# Patient Record
Sex: MALE | Race: WHITE | NOT HISPANIC OR LATINO | Employment: FULL TIME | URBAN - METROPOLITAN AREA
[De-identification: names, ages, dates, MRNs, and addresses within clinical notes are randomized per-mention and may not be internally consistent; named-entity substitution may affect disease eponyms.]

---

## 2017-06-15 ENCOUNTER — OFFICE VISIT (OUTPATIENT)
Dept: PEDIATRICS | Facility: CLINIC | Age: 52
End: 2017-06-15
Payer: COMMERCIAL

## 2017-06-15 VITALS
SYSTOLIC BLOOD PRESSURE: 130 MMHG | HEIGHT: 68 IN | OXYGEN SATURATION: 97 % | BODY MASS INDEX: 32.58 KG/M2 | TEMPERATURE: 97.4 F | WEIGHT: 215 LBS | HEART RATE: 69 BPM | DIASTOLIC BLOOD PRESSURE: 70 MMHG

## 2017-06-15 DIAGNOSIS — Z13.6 CARDIOVASCULAR SCREENING; LDL GOAL LESS THAN 160: ICD-10-CM

## 2017-06-15 DIAGNOSIS — Z13.1 SCREENING FOR DIABETES MELLITUS: ICD-10-CM

## 2017-06-15 DIAGNOSIS — Z12.5 SCREENING FOR PROSTATE CANCER: ICD-10-CM

## 2017-06-15 DIAGNOSIS — D22.9 ATYPICAL MOLE: ICD-10-CM

## 2017-06-15 DIAGNOSIS — Z13.29 SCREENING FOR THYROID DISORDER: ICD-10-CM

## 2017-06-15 DIAGNOSIS — Z12.11 SCREENING FOR COLON CANCER: ICD-10-CM

## 2017-06-15 DIAGNOSIS — Z11.59 NEED FOR HEPATITIS C SCREENING TEST: ICD-10-CM

## 2017-06-15 DIAGNOSIS — Z00.00 ENCOUNTER FOR ROUTINE ADULT HEALTH EXAMINATION WITHOUT ABNORMAL FINDINGS: Primary | ICD-10-CM

## 2017-06-15 PROCEDURE — 99396 PREV VISIT EST AGE 40-64: CPT | Performed by: NURSE PRACTITIONER

## 2017-06-15 NOTE — PROGRESS NOTES
SUBJECTIVE:     CC: Nando Wolfe is an 51 year old male who presents for preventative health visit.     Physical   Annual:     Getting at least 3 servings of Calcium per day::  Yes    Bi-annual eye exam::  Yes    Dental care twice a year::  Yes    Sleep apnea or symptoms of sleep apnea::  Excessive snoring    Diet::  Regular (no restrictions)    Frequency of exercise::  4-5 days/week    Duration of exercise::  15-30 minutes    Taking medications regularly::  Yes    Medication side effects::  None    Additional concerns today::  No      Today's PHQ-2 Score: Answers for HPI/ROS submitted by the patient on 6/12/2017   PHQ-2 Score: 0    PHQ-2 ( 1999 [employer]) 6/15/2017   Q1: Little interest or pleasure in doing things 0   Q2: Feeling down, depressed or hopeless 0   PHQ-2 Score 0   Q1: Little interest or pleasure in doing things -   Q2: Feeling down, depressed or hopeless -   PHQ-2 Score -       Abuse: Current or Past(Physical, Sexual or Emotional)- No  Do you feel safe in your environment - Yes    Social History   Substance Use Topics     Smoking status: Former Smoker     Packs/day: 0.50     Types: Cigarettes     Quit date: 5/1/2013     Smokeless tobacco: Never Used     Alcohol use Yes      Comment: 4-5 drinks per wk     The patient does not drink >3 drinks per day nor >7 drinks per week.    Last PSA:   PSA   Date Value Ref Range Status   02/23/2011 0.52 0 - 4 ug/L Final       Recent Labs   Lab Test  02/23/11   0754   CHOL  197   HDL  50   LDL  129   TRIG  93   CHOLHDLRATIO  4.0       Reviewed orders with patient. Reviewed health maintenance and updated orders accordingly - Yes    Reviewed and updated as needed this visit by clinical staff  Tobacco  Allergies  Meds  Med Hx  Surg Hx  Fam Hx  Soc Hx        Reviewed and updated as needed this visit by Provider        Past Medical History:   Diagnosis Date     DDD (degenerative disc disease), lumbar       Past Surgical History:   Procedure Laterality Date     HERNIA  REPAIR       TONSILLECTOMY       ROS:  CONSTITUTIONAL:NEGATIVE for fever, chills, change in weight  INTEGUMENTARY/SKIN: NEGATIVE for changing lesion  and POSITIVE for skin tag on right abdomen   EYES: NEGATIVE for vision changes or irritation  ENT: NEGATIVE for ear, mouth and throat problems  RESP:NEGATIVE for significant cough or SOB  CV: NEGATIVE for chest pain, palpitations or peripheral edema  GI: NEGATIVE for nausea, abdominal pain, heartburn, or change in bowel habits   male: negative for dysuria, hematuria, decreased urinary stream, erectile dysfunction, urethral discharge  MUSCULOSKELETAL:NEGATIVE for significant arthralgias or myalgia  NEURO: NEGATIVE for weakness, dizziness or paresthesias  ENDOCRINE: NEGATIVE for temperature intolerance, skin/hair changes  HEME/ALLERGY/IMMUNE: NEGATIVE for bleeding problems  PSYCHIATRIC: NEGATIVE for changes in mood or affect    Problem list, Medication list, Allergies, and Medical/Social/Surgical histories reviewed in [facility] and updated as appropriate.  Labs reviewed in EPIC  BP Readings from Last 3 Encounters:   06/15/17 130/70   12/12/12 120/69   02/14/11 130/86    Wt Readings from Last 3 Encounters:   06/15/17 215 lb (97.5 kg)   12/12/12 228 lb 4 oz (103.5 kg)   02/14/11 234 lb 9.6 oz (106.4 kg)                  Patient Active Problem List   Diagnosis     CARDIOVASCULAR SCREENING; LDL GOAL LESS THAN 160     Obese     Back pain with radiation     Elevated blood sugar     DDD (degenerative disc disease), lumbar     Discogenic syndrome, lumbar     Past Surgical History:   Procedure Laterality Date     HERNIA REPAIR       TONSILLECTOMY         Social History   Substance Use Topics     Smoking status: Former Smoker     Packs/day: 0.50     Types: Cigarettes     Quit date: 5/1/2013     Smokeless tobacco: Never Used     Alcohol use Yes      Comment: 4-5 drinks per wk     Family History   Problem Relation Age of Onset     HEART DISEASE Paternal Grandfather          No current  "outpatient prescriptions on file.     No Known Allergies  OBJECTIVE:     /70 (BP Location: Right arm, Patient Position: Sitting, Cuff Size: Adult Regular)  Pulse 69  Temp 97.4  F (36.3  C) (Temporal)  Ht 5' 8\" (1.727 m)  Wt 215 lb (97.5 kg)  SpO2 97%  BMI 32.69 kg/m2   Wt Readings from Last 4 Encounters:   06/15/17 215 lb (97.5 kg)   12/12/12 228 lb 4 oz (103.5 kg)   02/14/11 234 lb 9.6 oz (106.4 kg)       EXAM:  GENERAL: healthy, alert and no distress  EYES: Eyes grossly normal to inspection, PERRL and conjunctivae and sclerae normal  HENT: ear canals and TM's normal, nose and mouth without ulcers or lesions  NECK: no adenopathy, no asymmetry, masses, or scars and thyroid normal to palpation  RESP: lungs clear to auscultation - no rales, rhonchi or wheezes  CV: regular rate and rhythm, normal S1 S2, no S3 or S4, no murmur, click or rub, no peripheral edema and peripheral pulses strong  ABDOMEN: soft, nontender, no hepatosplenomegaly, no masses and bowel sounds normal   (male): normal male genitalia without lesions or urethral discharge, no hernia  RECTAL: normal sphincter tone, no rectal masses and prostate of normal size for age, smooth, nontender without masses/nodules  MS: no gross musculoskeletal defects noted, no edema  SKIN: no suspicious lesions or rashes. Atypical pearly mole on right side of nose   NEURO: Normal strength and tone, mentation intact and speech normal  PSYCH: mentation appears normal, affect normal/bright  LYMPH: no cervical, supraclavicular, axillary, or inguinal adenopathy    ASSESSMENT/PLAN:     Nando was seen today for physical.    Diagnoses and all orders for this visit:    Encounter for routine adult health examination without abnormal findings  -     **Comprehensive metabolic panel FUTURE anytime; Future  -     **Lipid panel reflex to direct LDL FUTURE anytime; Future  -     **TSH with free T4 reflex FUTURE anytime; Future  -     **Hepatitis C Screen Reflex to RNA " FUTURE anytime; Future  -     JUST IN CASE; Future    Need for hepatitis C screening test  -     **Hepatitis C Screen Reflex to RNA FUTURE anytime; Future    Screening for diabetes mellitus  -     **Comprehensive metabolic panel FUTURE anytime; Future    Screening for colon cancer  -     GASTROENTEROLOGY ADULT REF PROCEDURE ONLY    CARDIOVASCULAR SCREENING; LDL GOAL LESS THAN 160  -     **Lipid panel reflex to direct LDL FUTURE anytime; Future    Screening for thyroid disorder  -     **TSH with free T4 reflex FUTURE anytime; Future    Atypical mole nose  -     DERMATOLOGY REFERRAL    Screening for prostate cancer  -     Prostate spec antigen screen; Future    PLAN:    Patient needs to follow up in if no improvement,or sooner if worsening of symptoms or other symptoms develop.  CONSULTATION/REFERRAL to gastroenterology for colonoscopy   Dermatology referral   FUTURE LABS:       - Schedule a fasting blood draw   Will follow up and/or notify patient of  results via My Chart to determine further need for followup  Follow up office visit in one year for annual health maintenance exam, sooner PRN.      COUNSELING:   Reviewed preventive health counseling, as reflected in patient instructions  Special attention given to:        Regular exercise       Healthy diet/nutrition       Consider Hep C screening for patients born between 1945 and 1965       Colon cancer screening       Prostate cancer screening       The ASCVD Risk score ([employer] ANUPAMA Jr, et al., 2013) failed to calculate for the following reasons:    Cannot find a previous HDL lab    Cannot find a previous total cholesterol lab    BP Screening:   Last 3 BP Readings:    BP Readings from Last 3 Encounters:   06/15/17 130/70   12/12/12 120/69   02/14/11 130/86       The following was recommended to the patient:  Re-screen BP within a year and recommended lifestyle modifications     reports that he quit smoking about 4 years ago. His smoking use included Cigarettes. He  "smoked 0.50 packs per day. He has never used smokeless tobacco.    Estimated body mass index is 34.45 kg/(m^2) as calculated from the following:    Height as of 12/12/12: 5' 8.25\" (1.734 m).    Weight as of 12/12/12: 228 lb 4 oz (103.5 kg).   Weight management plan: Discussed healthy diet and exercise guidelines and patient will follow up in 12 months in clinic to re-evaluate.    Counseling Resources:  ATP IV Guidelines  Pooled Cohorts Equation Calculator  FRAX Risk Assessment  ICSI Preventive Guidelines  Dietary Guidelines for Americans, 2010  USDA's MyPlate  ASA Prophylaxis  Lung CA Screening    KWADWO Soares CNP  [facility]  "

## 2017-06-15 NOTE — MR AVS SNAPSHOT
After Visit Summary   6/15/2017    Nando Wolfe    MRN: [id number]           Patient Information     Date Of Birth          1965        Visit Information        Provider Department      6/15/2017 4:20 PM Shania Sosa APRN CNP [facility]        Today's Diagnoses     Encounter for routine adult health examination without abnormal findings    -  1    Need for hepatitis C screening test        Screening for diabetes mellitus        Screening for colon cancer        CARDIOVASCULAR SCREENING; LDL GOAL LESS THAN 160        Screening for thyroid disorder        Atypical mole nose        Screening for prostate cancer          Care Instructions    PLAN:   1.  Orders Placed This Encounter   Procedures     **Comprehensive metabolic panel FUTURE anytime     **Lipid panel reflex to direct LDL FUTURE anytime     **TSH with free T4 reflex FUTURE anytime     **Hepatitis C Screen Reflex to RNA FUTURE anytime     JUST IN CASE     Prostate spec antigen screen     GASTROENTEROLOGY ADULT REF PROCEDURE ONLY     DERMATOLOGY REFERRAL     2. Patient needs to follow up in if no improvement,or sooner if worsening of symptoms or other symptoms develop.  CONSULTATION/REFERRAL to gastroenterology for colonoscopy   Dermatology referral   FUTURE LABS:       - Schedule a fasting blood draw   Will follow up and/or notify patient of  results via My Chart to determine further need for followup  Follow up office visit in one year for annual health maintenance exam, sooner PRN.    Preventive Health Recommendations  Male Ages 50 - 64    Yearly exam:             See your health care provider every year in order to  o   Review health changes.   o   Discuss preventive care.    o   Review your medicines if your doctor has prescribed any.     Have a cholesterol test every 5 years, or more frequently if you are at risk for high cholesterol/heart disease.     Have a diabetes test (fasting glucose) every three  years. If you are at risk for diabetes, you should have this test more often.     Have a colonoscopy at age 50, or have a yearly FIT test (stool test). These exams will check for colon cancer.      Talk with your health care provider about whether or not a prostate cancer screening test (PSA) is right for you.    You should be tested each year for STDs (sexually transmitted diseases), if you re at risk.     Shots: Get a flu shot each year. Get a tetanus shot every 10 years.     Nutrition:    Eat at least 5 servings of fruits and vegetables daily.     Eat whole-grain bread, whole-wheat pasta and brown rice instead of white grains and rice.     Talk to your provider about Calcium and Vitamin D.     Lifestyle    Exercise for at least 150 minutes a week (30 minutes a day, 5 days a week). This will help you control your weight and prevent disease.     Limit alcohol to one drink per day.     No smoking.     Wear sunscreen to prevent skin cancer.     See your dentist every six months for an exam and cleaning.     See your eye doctor every 1 to 2 years.    It was a pleasure seeing you today at the [facility]. Thank you for allowing us to care for you today. We truly hope we provided you with the excellent service you deserve. Please let us know if there is anything else we can do for you so we can be sure you are leaving completley satisfied with your care experience.       General information about your clinic   Clinic Hours Lab Hours (Appointments are required)   Mon-Thurs: 7:30 AM - 7 PM Mon-Thurs: 7:30 AM - 7 PM   Fri: 7:30 AM - 5 PM Fri: 7:30 AM - 5 PM        After Hours Nurse Advise & Appts:  [facility]: [phone]  [facility] On Call: to make appointments anytime: [phone] On Call Physician: call [phone] and answering service will page the on call physician.        For urgent appointments, please call [phone] and ask for the triage nurse or your care team  clinic nurse.  How to contact my care team:  Vikash: [email or web address]   Phone: [phone]   Fax: [phone]       [facility]:   Phone: [phone]  Hours: 8:00 AM - 6:00 PM  Medication Refills:  Call your pharmacy and they will forward the refill to us. Please allow 3 business days for your refills to be completed.       Normal or non-critical lab and imaging results will be communicated to you by MyChart, letter or phone within 7 days.  If you do not hear from us within 10 days, please call the clinic. If you have a critical or abnormal lab result, we will notify you by phone as soon as possible.       We now have PWIC (Pediatric Walk in Care)  Monday-Friday from 7:30-4. Simply walk in and be seen for your urgent needs like cough, fever, rash, diarrhea or vomiting, pink eye, UTI. No appointments needed. Ask one of the team for more information      -Your Care Team:    Dr. Hua Lemus - Internal Medicine/Pediatrics   Dr. Dulce Degroot - Family Medicine  Dr. Diana Matias - Pediatrics  Shania Sosa CNP - Family Practice Nurse Practitioner  Dr. Linda Ordonez - Pediatrics  Dr. Leland Irvin - Internal Medicine                      Follow-ups after your visit        Additional Services     DERMATOLOGY REFERRAL       Your provider has referred you to: [facility]: [facility] [place] [phone]   [email or web address]    Please be aware that coverage of these services is subject to the terms and limitations of your health insurance plan.  Call member services at your health plan with any benefit or coverage questions.      Please bring the following with you to your appointment:    (1) Any X-Rays, CTs or MRIs which have been performed.  Contact the facility where they were done to arrange for  prior to your scheduled appointment.    (2) List of current medications  (3) This referral request   (4) Any documents/labs given to you  for this referral            GASTROENTEROLOGY ADULT REF PROCEDURE ONLY                 Future tests that were ordered for you today     Open Future Orders        Priority Expected Expires Ordered    Prostate spec antigen screen Routine  6/15/2018 6/15/2017    **Lipid panel reflex to direct LDL FUTURE anytime Routine 6/15/2017 12/15/2017 6/15/2017    **TSH with free T4 reflex FUTURE anytime Routine 6/15/2017 12/15/2017 6/15/2017    **Hepatitis C Screen Reflex to RNA FUTURE anytime Routine 6/15/2017 12/15/2017 6/15/2017    JUST IN CASE Routine  12/15/2017 6/15/2017    **Comprehensive metabolic panel FUTURE anytime Routine 6/15/2017 12/15/2017 6/15/2017            Who to contact     If you have questions or need follow up information about today's clinic visit or your schedule please contact [facility] directly at [phone].  Normal or non-critical lab and imaging results will be communicated to you by MergeLocalhart, letter or phone within 4 business days after the clinic has received the results. If you do not hear from us within 7 days, please contact the clinic through [facility] or phone. If you have a critical or abnormal lab result, we will notify you by phone as soon as possible.  Submit refill requests through [employer]oroto or call your pharmacy and they will forward the refill request to us. Please allow 3 business days for your refill to be completed.          Additional Information About Your Visit        MergeLocalhart Information     Joroto gives you secure access to your electronic health record. If you see a primary care provider, you can also send messages to your care team and make appointments. If you have questions, please call your primary care clinic.  If you do not have a primary care provider, please call [phone] and they will assist you.      Joroto is an electronic gateway that provides easy, online access to your medical records. With Joroto, you can request a clinic appointment, read  "your test results, renew a prescription or communicate with your care team.     To access your existing account, please contact your [facility] or call [phone] for assistance.        Care EveryWhere ID     This is your Care EveryWhere ID. This could be used by other organizations to access your [facility] medical records  [id number]        Your Vitals Were     Pulse Temperature Height Pulse Oximetry BMI (Body Mass Index)       69 97.4  F (36.3  C) (Temporal) 5' 8\" (1.727 m) 97% 32.69 kg/m2        Blood Pressure from Last 3 Encounters:   06/15/17 130/70   12/12/12 120/69   02/14/11 130/86    Weight from Last 3 Encounters:   06/15/17 215 lb (97.5 kg)   12/12/12 228 lb 4 oz (103.5 kg)   02/14/11 234 lb 9.6 oz (106.4 kg)              We Performed the Following     DERMATOLOGY REFERRAL     GASTROENTEROLOGY ADULT REF PROCEDURE ONLY        Primary Care Provider    [facility]       No address on file        Thank you!     Thank you for choosing [facility]  for your care. Our goal is always to provide you with excellent care. Hearing back from our patients is one way we can continue to improve our services. Please take a few minutes to complete the written survey that you may receive in the mail after your visit with us. Thank you!             Your Updated Medication List - Protect others around you: Learn how to safely use, store and throw away your medicines at www.disposemymeds.org.      Notice  As of 6/15/2017  4:40 PM    You have not been prescribed any medications.      "

## 2017-06-15 NOTE — NURSING NOTE
"Chief Complaint   Patient presents with     Physical     REGAN-not fasting today       Initial /70 (BP Location: Right arm, Patient Position: Sitting, Cuff Size: Adult Regular)  Pulse 69  Temp 97.4  F (36.3  C) (Temporal)  Ht 5' 8\" (1.727 m)  Wt 215 lb (97.5 kg)  SpO2 97%  BMI 32.69 kg/m2 Estimated body mass index is 32.69 kg/(m^2) as calculated from the following:    Height as of this encounter: 5' 8\" (1.727 m).    Weight as of this encounter: 215 lb (97.5 kg).  Medication Reconciliation: complete      JANELLE Govea      "

## 2017-06-23 DIAGNOSIS — Z13.29 SCREENING FOR THYROID DISORDER: ICD-10-CM

## 2017-06-23 DIAGNOSIS — Z13.1 SCREENING FOR DIABETES MELLITUS: ICD-10-CM

## 2017-06-23 DIAGNOSIS — Z13.6 CARDIOVASCULAR SCREENING; LDL GOAL LESS THAN 160: ICD-10-CM

## 2017-06-23 DIAGNOSIS — Z00.00 ENCOUNTER FOR ROUTINE ADULT HEALTH EXAMINATION WITHOUT ABNORMAL FINDINGS: ICD-10-CM

## 2017-06-23 DIAGNOSIS — Z11.59 NEED FOR HEPATITIS C SCREENING TEST: ICD-10-CM

## 2017-06-23 DIAGNOSIS — Z12.5 SCREENING FOR PROSTATE CANCER: ICD-10-CM

## 2017-06-23 LAB
ALBUMIN SERPL-MCNC: 3.9 G/DL (ref 3.4–5)
ALP SERPL-CCNC: 92 U/L (ref 40–150)
ALT SERPL W P-5'-P-CCNC: 32 U/L (ref 0–70)
ANION GAP SERPL CALCULATED.3IONS-SCNC: 7 MMOL/L (ref 3–14)
AST SERPL W P-5'-P-CCNC: 20 U/L (ref 0–45)
BILIRUB SERPL-MCNC: 0.5 MG/DL (ref 0.2–1.3)
BUN SERPL-MCNC: 17 MG/DL (ref 7–30)
CALCIUM SERPL-MCNC: 8.7 MG/DL (ref 8.5–10.1)
CHLORIDE SERPL-SCNC: 105 MMOL/L (ref 94–109)
CHOLEST SERPL-MCNC: 163 MG/DL
CO2 SERPL-SCNC: 28 MMOL/L (ref 20–32)
CREAT SERPL-MCNC: 0.89 MG/DL (ref 0.66–1.25)
GFR SERPL CREATININE-BSD FRML MDRD: 90 ML/MIN/1.7M2
GLUCOSE SERPL-MCNC: 108 MG/DL (ref 70–99)
HCV AB SERPL QL IA: NORMAL
HDLC SERPL-MCNC: 39 MG/DL
LDLC SERPL CALC-MCNC: 110 MG/DL
NONHDLC SERPL-MCNC: 124 MG/DL
POTASSIUM SERPL-SCNC: 4.1 MMOL/L (ref 3.4–5.3)
PROT SERPL-MCNC: 7.7 G/DL (ref 6.8–8.8)
PSA SERPL-ACNC: 0.52 UG/L (ref 0–4)
SODIUM SERPL-SCNC: 140 MMOL/L (ref 133–144)
TRIGL SERPL-MCNC: 70 MG/DL
TSH SERPL DL<=0.005 MIU/L-ACNC: 1.24 MU/L (ref 0.4–4)

## 2017-06-23 PROCEDURE — 80053 COMPREHEN METABOLIC PANEL: CPT | Performed by: NURSE PRACTITIONER

## 2017-06-23 PROCEDURE — 36415 COLL VENOUS BLD VENIPUNCTURE: CPT | Performed by: NURSE PRACTITIONER

## 2017-06-23 PROCEDURE — 84443 ASSAY THYROID STIM HORMONE: CPT | Performed by: NURSE PRACTITIONER

## 2017-06-23 PROCEDURE — 86803 HEPATITIS C AB TEST: CPT | Performed by: NURSE PRACTITIONER

## 2017-06-23 PROCEDURE — G0103 PSA SCREENING: HCPCS | Performed by: NURSE PRACTITIONER

## 2017-06-23 PROCEDURE — 80061 LIPID PANEL: CPT | Performed by: NURSE PRACTITIONER

## 2017-06-23 NOTE — PROGRESS NOTES
Anat Wolfe,    Attached are your test results.  -Liver and gallbladder tests (ALT,AST, Alk phos,bilirubin) are normal.  -Kidney function (GFR) is normal.  -Sodium is normal.  -Potassium is normal.  -Glucose is slight elevated and may be sign of early diabetes (prediabetes). ADVISE:: low carbohydrate diet, exercise, try to lose weight (if necessary) and recheck glucose in 12 months. (GLU,A1C, DX: prediabetes)  -LDL(bad) cholesterol and trigylceride levels are normal.  -HDL(good) cholesterol level is low which can increase your heart disease risk.  A diet high in fat and simple carbohydrates, genetics and being overweight can contribute to this.   ADVISE: a regular exercise program with at least 30 minutes of aerobic exercise 3-4 days/week ( 45 minutes 4-6 days/week if weight loss needed), and omega-3 fatty acids (fish oil) daily are helpful to improve this.  Rechecking your cholesterol in 12 months is recommended (LIPID w/ LDL reflex, DX: low HDL).  -PSA (prostate specific antigen) test is normal.  -TSH (thyroid stimulating hormone) level is normal which indicates normal thyroid function.   Please contact us if you have any questions.    Shania Sosa, CNP

## 2017-06-25 NOTE — PROGRESS NOTES
Anat Wolfe,    Attached are your test results.  -Hepatitis C antibody screen test shows no signs of a previous hepatitis C infection.   Please contact us if you have any questions.    Shania Ssoa, CNP

## 2017-08-10 ENCOUNTER — OFFICE VISIT (OUTPATIENT)
Dept: FAMILY MEDICINE | Facility: CLINIC | Age: 52
End: 2017-08-10
Payer: COMMERCIAL

## 2017-08-10 VITALS — TEMPERATURE: 98.8 F | DIASTOLIC BLOOD PRESSURE: 69 MMHG | SYSTOLIC BLOOD PRESSURE: 118 MMHG

## 2017-08-10 DIAGNOSIS — Z23 NEED FOR VACCINATION: ICD-10-CM

## 2017-08-10 DIAGNOSIS — Z71.84 TRAVEL ADVICE ENCOUNTER: Primary | ICD-10-CM

## 2017-08-10 PROCEDURE — 90471 IMMUNIZATION ADMIN: CPT | Mod: GA | Performed by: NURSE PRACTITIONER

## 2017-08-10 PROCEDURE — 90691 TYPHOID VACCINE IM: CPT | Mod: GA | Performed by: NURSE PRACTITIONER

## 2017-08-10 PROCEDURE — 90636 HEP A/HEP B VACC ADULT IM: CPT | Mod: GA | Performed by: NURSE PRACTITIONER

## 2017-08-10 PROCEDURE — 90707 MMR VACCINE SC: CPT | Mod: GA | Performed by: NURSE PRACTITIONER

## 2017-08-10 PROCEDURE — 90472 IMMUNIZATION ADMIN EACH ADD: CPT | Mod: GA | Performed by: NURSE PRACTITIONER

## 2017-08-10 PROCEDURE — 99402 PREV MED CNSL INDIV APPRX 30: CPT | Mod: 25 | Performed by: NURSE PRACTITIONER

## 2017-08-10 RX ORDER — AZITHROMYCIN 500 MG/1
500 TABLET, FILM COATED ORAL DAILY
Qty: 3 TABLET | Refills: 0 | Status: SHIPPED | OUTPATIENT
Start: 2017-08-10 | End: 2017-08-13

## 2017-08-10 NOTE — PROGRESS NOTES
Nurse Note      Itinerary:  [facility]       Departure Date: 09/24/2017      Return Date: 10/08/2017      Length of Trip 2 weeks      Reason for Travel: Tourism and Visiting family            Urban or rural: urban      Accommodations: Hotel and family        IMMUNIZATION HISTORY  Have you received any immunizations within the past 4 weeks?  No  Have you ever fainted from having your blood drawn or from an injection?  No  Have you ever had a fever reaction to vaccination?  No  Have you ever had any bad reaction or side effect from any vaccination?  No  Have you ever had hepatitis A or B vaccine?  No  Do you live (or work closely) with anyone who has AIDS, an AIDS-like condition, any other immune disorder or who is on chemotherapy for cancer?  No  Do you have a family history of immunodeficiency?  No  Have you received any injection of immune globulin or any blood products during the past 12 months?  No    Patient roomed by ARGENIS Fry  Nando Wolfe is a 52 year old male seen today with spouse  with children for counsultation for international travel to [facility] for Tourism  Visiting friends and relatives.  Patient will be departing in  6 week(s) and staying for   2 week(s) and  traveling with family member(s).      Patient itinerary :  will be in the urban region [facility] [address] which presents risk for Dengue Fever, Chikungungya, Zika, Schistosomiasis, Rabies, food borne illnesses, motor vehicle accidents and Typhoid. exposure.      Patient's activities will include sightseeing and visiting friends and relatives.    Patient's country of birth is [place]    Special medical concerns: none  Pre-travel questionnaire was completed by patient and reviewed by provider.     Vitals: /69  Temp 98.8  F (37.1  C) (Tympanic)  BMI= There is no height or weight on file to calculate BMI.    EXAM:  General:  Well-nourished, well-developed in no acute distress.  Appears to be stated age, interacts  appropriately and expresses understanding of information given to patient.    Current Outpatient Prescriptions   Medication Sig Dispense Refill     azithromycin (ZITHROMAX) 500 MG tablet Take 1 tablet (500 mg) by mouth daily for 3 doses Take 1 tablet a day for up to 3 days for severe diarrhea 3 tablet 0     Patient Active Problem List   Diagnosis     CARDIOVASCULAR SCREENING; LDL GOAL LESS THAN 160     Obese     Back pain with radiation     Elevated blood sugar     DDD (degenerative disc disease), lumbar     Discogenic syndrome, lumbar     No Known Allergies      Immunizations discussed include:   Hepatitis A:  Twin Kristian series started today  Hepatitis B: Twin Kristian series started today  Influenza: vaccine is not available  Typhoid: Ordered/given today, risks, benefits and side effects reviewed  Rabies: Declined  Cost  reviewed managment of a animal bite or scratch (washing wound, seek medical care within 24 hours for post exposure prophylaxis )  Yellow Fever: Not indicated  Japanese Encephalitis: Declined  Cost  Not concerned about risk of disease  Meningococcus: Not indicated  Tetanus/Diphtheria: Up to date  Measles/Mumps/Rubella: Ordered/given today  Cholera: Not needed  Polio: Up to date  Pneumococcal: Under age of 65  Varicella: Immune by disease history per patient report  Zostavax:  Not indicated  HPV:  Not indicated  TB:  Low risk     Altitude Exposure on this trip: nO  Past tolerance to Altitude: NA    ASSESSMENT/PLAN:    ICD-10-CM    1. Travel advice encounter Z71.89 TYPHOID VACCINE, IM     MMR VIRUS IMMUNIZATION, SUBCUT     HEPA/HEPB VACCINE ADULT IM     HEPA/HEPB VACCINE ADULT IM     azithromycin (ZITHROMAX) 500 MG tablet     CANCELED: HEPA VACCINE ADULT IM     CANCELED: Hepatitis B Surface Antibody   2. Need for vaccination Z23 TYPHOID VACCINE, IM     MMR VIRUS IMMUNIZATION, SUBCUT     HEPA/HEPB VACCINE ADULT IM     HEPA/HEPB VACCINE ADULT IM     CANCELED: HEPA VACCINE ADULT IM     CANCELED: Hepatitis B  Surface Antibody     I have reviewed general recommendations for safe travel   including: food/water precautions, insect precautions, safer sex   practices given high prevalence of Zika, HIV and other STDs,   roadway safety. Educational materials and Travax report provided.    Malaraia prophylaxis recommended: NONE  Symptomatic treatment for traveler's diarrhea: azithromycin  Altitude illness prevention and treatment: NO      Evacuation insurance advised and resources were provided to patient.    Total visit time 30 minutes  with over 50% of time spent counseling patient as detailed above.    Hali Berry CNP

## 2017-08-10 NOTE — MR AVS SNAPSHOT
After Visit Summary   8/10/2017    Nando Wolfe    MRN: [id number]           Patient Information     Date Of Birth          1965        Visit Information        Provider Department      8/10/2017 1:00 PM Hali Berry APRN CNP [facility]        Today's Diagnoses     Travel advice encounter    -  1    Need for vaccination          Care Instructions    Today August 10, 2017 you received the    Twinrix (Hepatitis A & B combo) Vaccine - Please return on 9/9/17 for your 2nd dose and 2/6/18 or later for your 3rd and final dose.    MMR (Measles Mumps Rubella) Vaccine    Typhoid - injectable. This vaccine is valid for two years.   .    These appointments can be made as a NURSE ONLY visit.    **It is very important for the vaccinations to be given on the scheduled day(s), this helps ensure you receive the full effectiveness of the vaccine.**    Please call [facility] with any questions [phone]    Thank you for visiting [facility]              Follow-ups after your visit        Your next 10 appointments already scheduled     Aug 14, 2017  1:30 PM CDT   New Visit with Blaine Gary MD   [facility] ([facility])    [address] [place] [id number] [phone]              Future tests that were ordered for you today     Open Standing Orders        Priority Remaining Interval Expires Ordered    HEPA/HEPB VACCINE ADULT IM Routine 2/3  7/10/2018 8/10/2017            Who to contact     If you have questions or need follow up information about today's clinic visit or your schedule please contact [facility] directly at [phone].  Normal or non-critical lab and imaging results will be communicated to you by MyChart, letter or phone within 4 business days after the clinic has received the results. If you do not hear from us within 7 days, please contact the clinic through [employer]  or phone. If you have a critical or abnormal lab result, we will notify you by phone as soon as possible.  Submit refill requests through [employer] or call your pharmacy and they will forward the refill request to us. Please allow 3 business days for your refill to be completed.          Additional Information About Your Visit        51eduhart Information     [employer] gives you secure access to your electronic health record. If you see a primary care provider, you can also send messages to your care team and make appointments. If you have questions, please call your primary care clinic.  If you do not have a primary care provider, please call [phone] and they will assist you.        Care EveryWhere ID     This is your Care EveryWhere ID. This could be used by other organizations to access your [place] medical records  [id number]        Your Vitals Were     Temperature                   98.8  F (37.1  C) (Tympanic)            Blood Pressure from Last 3 Encounters:   08/10/17 118/69   06/15/17 130/70   12/12/12 120/69    Weight from Last 3 Encounters:   06/15/17 215 lb (97.5 kg)   12/12/12 228 lb 4 oz (103.5 kg)   02/14/11 234 lb 9.6 oz (106.4 kg)              We Performed the Following     MMR VIRUS IMMUNIZATION, SUBCUT     TYPHOID VACCINE, IM          Today's Medication Changes          These changes are accurate as of: 8/10/17  2:02 PM.  If you have any questions, ask your nurse or doctor.               Start taking these medicines.        Dose/Directions    azithromycin 500 MG tablet   Commonly known as:  ZITHROMAX   Used for:  Travel advice encounter   Started by:  Hali Berry APRN CNP        Dose:  500 mg   Take 1 tablet (500 mg) by mouth daily for 3 doses Take 1 tablet a day for up to 3 days for severe diarrhea   Quantity:  3 tablet   Refills:  0            Where to get your medicines      These medications were sent to [facility] [address] [place] [address] [address], [place] [address] AT [facility]  [facility]  [address], [facility] [address]     Phone:  [phone]     azithromycin 500 MG tablet                Primary Care Provider    [facility]       No address on file        Equal Access to Services     KEVIN CANTOR : Hadii aad ku hadcaryo Socharbelali, waaxda luqadaha, qaybta kaalmada adearavind, renaldo loaiza kelbybere asif montserrat english. So [facility] [phone].    ATENCIÓN: Si habla español, tiene a newman disposición servicios gratuitos de asistencia lingüística. Llame al [phone].    We comply with applicable federal civil rights laws and [place] laws. We do not discriminate on the basis of race, color, national origin, age, disability sex, sexual orientation or gender identity.            Thank you!     Thank you for choosing [facility]  for your care. Our goal is always to provide you with excellent care. Hearing back from our patients is one way we can continue to improve our services. Please take a few minutes to complete the written survey that you may receive in the mail after your visit with us. Thank you!             Your Updated Medication List - Protect others around you: Learn how to safely use, store and throw away your medicines at www.disposemymeds.org.          This list is accurate as of: 8/10/17  2:02 PM.  Always use your most recent med list.                   Brand Name Dispense Instructions for use Diagnosis    azithromycin 500 MG tablet    ZITHROMAX    3 tablet    Take 1 tablet (500 mg) by mouth daily for 3 doses Take 1 tablet a day for up to 3 days for severe diarrhea    Travel advice encounter

## 2017-08-14 ENCOUNTER — OFFICE VISIT (OUTPATIENT)
Dept: DERMATOLOGY | Facility: CLINIC | Age: 52
End: 2017-08-14
Attending: NURSE PRACTITIONER
Payer: COMMERCIAL

## 2017-08-14 DIAGNOSIS — L81.4 SOLAR LENTIGINOSIS: ICD-10-CM

## 2017-08-14 DIAGNOSIS — D18.01 CHERRY ANGIOMA: ICD-10-CM

## 2017-08-14 DIAGNOSIS — I78.1 TELANGIECTASIA: ICD-10-CM

## 2017-08-14 DIAGNOSIS — D22.9 MULTIPLE NEVI: Primary | ICD-10-CM

## 2017-08-14 PROCEDURE — 99203 OFFICE O/P NEW LOW 30 MIN: CPT | Performed by: DERMATOLOGY

## 2017-08-14 NOTE — NURSING NOTE
"Nando Wolfe's goals for this visit include: spot on nose, full skin check  He requests these members of his care team be copied on today's visit information: no    PCP: [facility]    Referring Provider:  KWADWO Soares CNP  [address]  [place] [address]    Chief Complaint   Patient presents with     Skin Check     spot on nose, full skin check       Initial There were no vitals taken for this visit. Estimated body mass index is 32.69 kg/(m^2) as calculated from the following:    Height as of 6/15/17: 1.727 m (5' 8\").    Weight as of 6/15/17: 97.5 kg (215 lb).  Medication Reconciliation: complete    Do you need any medication refills at today's visit? No    Nadya Godinez LPN      "

## 2017-08-14 NOTE — PROGRESS NOTES
[facility] Dermatology Note      Dermatology Problem List:    Last TBSE: 8/14/2017    Encounter Date: Aug 14, 2017    CC:  Chief Complaint   Patient presents with     Skin Check     spot on nose, full skin check       History of Present Illness:  Mr. Nando Wolfe is a 52 year old male who presents for a skin check. There is one lesion on his nose that has been present for a long time but pt cannot recall exactly how long. The lesion is not pruritic, burns, or spontaneously bleeds. Pt is unsure if the lesion has grown. He was seen a few months ago by his PCP for a physical when she expressed concern of this lesion. No other lesions of concern.     Past Medical History:   Patient Active Problem List   Diagnosis     CARDIOVASCULAR SCREENING; LDL GOAL LESS THAN 160     Obese     Back pain with radiation     Elevated blood sugar     DDD (degenerative disc disease), lumbar     Discogenic syndrome, lumbar     Past Medical History:   Diagnosis Date     DDD (degenerative disc disease), lumbar      Past Surgical History:   Procedure Laterality Date     HERNIA REPAIR       TONSILLECTOMY         Social History:  The patient works as an . The patient denies use of tanning beds.    Family History:  There is no family history of skin cancer.    Medications:  No current outpatient prescriptions on file.       No Known Allergies    Review of Systems:  -Skin/Heme New Pt: The patient denies frequent sun exposure. The patient denies excessive scarring or problems healing except as per HPI. The patient denies excessive bleeding.  -Constitutional: The patient denies fatigue, fevers, chills, unintended weight loss, and night sweats.    Physical exam:  Vitals: There were no vitals taken for this visit.  GEN: This is a well developed, well-nourished male in no acute distress, in a pleasant mood.    NEURO: Alert and oriented  PSYCH: In pleasant mood, appropriate affect  SKIN: Total skin excluding  the undergarment areas was performed. The exam included the head/face, neck, both arms, chest, back, abdomen, both legs, digits and/or nails.   -There are bright red some shaped papules scattered on examined surfaces.   -Multiple regular brown pigmented macules and papules are identified on examined surfaces.   -Scattered brown macules on sun exposed areas.  -Scattered telangiectasias over the tip of the nose  -No other lesions of concern on areas examined.     Impression/Plan:  1. Multiple benign nevi: nevi on the nose appear normal. Pt has lots of small pigmented macules. The larges nevus is on the left chest and appears also normal. No concerns.  2. Telangiectasias: Right nasal tip has a collections of telangiectasias as well as other area sof the face. This is largely 2/2 chronic sun damaged. Benign. No concerns.  3. Cherry angiomas and Solar lentigines    No further intervention required. Patient to report changes.     BRIGITTE Sosa CNP on close of this encounter.  Follow-up prn for new or changing lesions. Pt should have yearly skin checks from Primary care. Pt prefers this to derm yearly skin checks.      Staff Involved:  Scribe/Staff    Scribe Disclosure:   I, Helio Collins, am serving as a scribe to document services personally performed by Dr. Blaine Gary, based on data collection and the provider's statements to me.     Provider Disclosure:   I have reviewed the documentation recorded by the scribe and have edited it as needed. I have personally performed the services documented here and the documentation accurately represents those services and the decisions made by me.     Blaine Gary MD, MS    Department of Dermatology  [facility]: Phone: [phone], Fax:[phone]  [facility]: Phone: [phone], Fax: [phone]

## 2017-08-14 NOTE — LETTER
8/14/2017       RE: Nando Wolfe  [address] [address]     Dear Colleague,    Thank you for referring your patient, Nando Wolfe, to the [facility]. Please see a copy of my visit note below.    [facility] Dermatology Note      Dermatology Problem List:    Last TBSE: 8/14/2017    Encounter Date: Aug 14, 2017    CC:  Chief Complaint   Patient presents with     Skin Check     spot on nose, full skin check       History of Present Illness:  Mr. Nando Wolfe is a 52 year old male who presents for a skin check. There is one lesion on his nose that has been present for a long time but pt cannot recall exactly how long. The lesion is not pruritic, burns, or spontaneously bleeds. Pt is unsure if the lesion has grown. He was seen a few months ago by his PCP for a physical when she expressed concern of this lesion. No other lesions of concern.     Past Medical History:   Patient Active Problem List   Diagnosis     CARDIOVASCULAR SCREENING; LDL GOAL LESS THAN 160     Obese     Back pain with radiation     Elevated blood sugar     DDD (degenerative disc disease), lumbar     Discogenic syndrome, lumbar     Past Medical History:   Diagnosis Date     DDD (degenerative disc disease), lumbar      Past Surgical History:   Procedure Laterality Date     HERNIA REPAIR       TONSILLECTOMY         Social History:  The patient works as an . The patient denies use of tanning beds.    Family History:  There is no family history of skin cancer.    Medications:  No current outpatient prescriptions on file.       No Known Allergies    Review of Systems:  -Skin/Heme New Pt: The patient denies frequent sun exposure. The patient denies excessive scarring or problems healing except as per HPI. The patient denies excessive bleeding.  -Constitutional: The patient denies fatigue, fevers, chills,  unintended weight loss, and night sweats.    Physical exam:  Vitals: There were no vitals taken for this visit.  GEN: This is a well developed, well-nourished male in no acute distress, in a pleasant mood.    NEURO: Alert and oriented  PSYCH: In pleasant mood, appropriate affect  SKIN: Total skin excluding the undergarment areas was performed. The exam included the head/face, neck, both arms, chest, back, abdomen, both legs, digits and/or nails.   -There are bright red some shaped papules scattered on examined surfaces.   -Multiple regular brown pigmented macules and papules are identified on examined surfaces.   -Scattered brown macules on sun exposed areas.  -Scattered telangiectasias over the tip of the nose  -No other lesions of concern on areas examined.     Impression/Plan:  1. Multiple benign nevi: nevi on the nose appear normal. Pt has lots of small pigmented macules. The larges nevus is on the left chest and appears also normal. No concerns.  2. Telangiectasias: Right nasal tip has a collections of telangiectasias as well as other area sof the face. This is largely 2/2 chronic sun damaged. Benign. No concerns.  3. Cherry angiomas and Solar lentigines    No further intervention required. Patient to report changes.     BRIGITTE Sosa CNP on close of this encounter.  Follow-up prn for new or changing lesions. Pt should have yearly skin checks from Primary care. Pt prefers this to derm yearly skin checks.      Staff Involved:  Scribe/Staff    Scribe Disclosure:   I, Helio Collins, am serving as a scribe to document services personally performed by Dr. Blaine Gary, based on data collection and the provider's statements to me.     Provider Disclosure:   I have reviewed the documentation recorded by the scribe and have edited it as needed. I have personally performed the services documented here and the documentation accurately represents those services and the decisions made by me.     Blaine Gary MD, MS  Assistant  Professor  Department of Dermatology  [facility]: Phone: [phone], Fax:[phone]  [facility]: Phone: [phone], Fax: [phone]          Again, thank you for allowing me to participate in the care of your patient.      Sincerely,    Blaine Gary MD

## 2017-08-14 NOTE — MR AVS SNAPSHOT
After Visit Summary   8/14/2017    Nando Wolfe    MRN: [id number]           Patient Information     Date Of Birth          1965        Visit Information        Provider Department      8/14/2017 1:30 PM Blaine Gary MD [facility]        Today's Diagnoses     Multiple nevi    -  1    Telangiectasia        Cherry angioma        Solar lentiginosis           Follow-ups after your visit        Who to contact     If you have questions or need follow up information about today's clinic visit or your schedule please contact [facility] directly at [phone].  Normal or non-critical lab and imaging results will be communicated to you by F[employer], letter or phone within 4 business days after the clinic has received the results. If you do not hear from us within 7 days, please contact the clinic through Fii[employer] or phone. If you have a critical or abnormal lab result, we will notify you by phone as soon as possible.  Submit refill requests through [employer] or call your pharmacy and they will forward the refill request to us. Please allow 3 business days for your refill to be completed.          Additional Information About Your Visit        MyChart Information     [employer] gives you secure access to your electronic health record. If you see a primary care provider, you can also send messages to your care team and make appointments. If you have questions, please call your primary care clinic.  If you do not have a primary care provider, please call [phone] and they will assist you.      [employer] is an electronic gateway that provides easy, online access to your medical records. With [employer], you can request a clinic appointment, read your test results, renew a prescription or communicate with your care team.     To access your existing account, please contact your [facility] or call [phone] for assistance.        Care  EveryWhere ID     This is your Care EveryWhere ID. This could be used by other organizations to access your [place] medical records  [id number]         Blood Pressure from Last 3 Encounters:   08/10/17 118/69   06/15/17 130/70   12/12/12 120/69    Weight from Last 3 Encounters:   06/15/17 215 lb (97.5 kg)   12/12/12 228 lb 4 oz (103.5 kg)   02/14/11 234 lb 9.6 oz (106.4 kg)              Today, you had the following     No orders found for display       Primary Care Provider    [facility]       No address on file        Equal Access to Services     KEVIN CANTOR : Hadii kellen cuetoo Sokathie, waaxda luqadaha, qaybta kaalmada adebereyanandini, renaldo mariano . So [facility] [phone].    ATENCIÓN: Si habla español, tiene a newman disposición servicios gratuitos de asistencia lingüística. Llame al [phone].    We comply with applicable federal civil rights laws and [place] laws. We do not discriminate on the basis of race, color, national origin, age, disability sex, sexual orientation or gender identity.            Thank you!     Thank you for choosing [facility]  for your care. Our goal is always to provide you with excellent care. Hearing back from our patients is one way we can continue to improve our services. Please take a few minutes to complete the written survey that you may receive in the mail after your visit with us. Thank you!             Your Updated Medication List - Protect others around you: Learn how to safely use, store and throw away your medicines at www.disposemymeds.org.      Notice  As of 8/14/2017  1:40 PM    You have not been prescribed any medications.

## 2017-09-11 ENCOUNTER — ALLIED HEALTH/NURSE VISIT (OUTPATIENT)
Dept: NURSING | Facility: CLINIC | Age: 52
End: 2017-09-11
Payer: COMMERCIAL

## 2017-09-11 DIAGNOSIS — Z23 NEED FOR VACCINATION: ICD-10-CM

## 2017-09-11 DIAGNOSIS — Z71.84 TRAVEL ADVICE ENCOUNTER: ICD-10-CM

## 2017-09-11 PROCEDURE — 90471 IMMUNIZATION ADMIN: CPT | Mod: GA

## 2017-09-11 PROCEDURE — 99207 ZZC NO CHARGE LOS: CPT

## 2017-09-11 PROCEDURE — 90636 HEP A/HEP B VACC ADULT IM: CPT | Mod: GA

## 2017-09-11 NOTE — PROGRESS NOTES
Screening Questionnaire for Adult Immunization    Are you sick today?   No   Do you have allergies to medications, food, a vaccine component or latex?   No   Have you ever had a serious reaction after receiving a vaccination?   No   Do you have a long-term health problem with heart disease, lung disease, asthma, kidney disease, metabolic disease (e.g. diabetes), anemia, or other blood disorder?   No   Do you have cancer, leukemia, HIV/AIDS, or any other immune system problem?   No   In the past 3 months, have you taken medications that affect  your immune system, such as prednisone, other steroids, or anticancer drugs; drugs for the treatment of rheumatoid arthritis, Crohn s disease, or psoriasis; or have you had radiation treatments?   No   Have you had a seizure, or a brain or other nervous system problem?   No   During the past year, have you received a transfusion of blood or blood     products, or been given immune (gamma) globulin or antiviral drug?   No   For women: Are you pregnant or is there a chance you could become        pregnant during the next month?   No   Have you received any vaccinations in the past 4 weeks?   No     Immunization questionnaire answers were all negative.        Per orders of SLY Berry, injection of Twinrix  given by Carmela Robison. Patient instructed to remain in clinic for 15 minutes afterwards, and to report any adverse reaction to me immediately.       Screening performed by Carmela Robison on 9/11/2017 at 12:48 PM.

## 2017-09-11 NOTE — NURSING NOTE
"Chief Complaint   Patient presents with     Allied Health Visit     Imm/Inj     Twinrix      There were no vitals taken for this visit. Estimated body mass index is 32.69 kg/(m^2) as calculated from the following:    Height as of 6/15/17: 5' 8\" (1.727 m).    Weight as of 6/15/17: 215 lb (97.5 kg).  bp completed using cuff size: NA (Not Taken)       Health Maintenance addressed:  NONE    n/a    Carmela Robison MA     "

## 2017-11-28 NOTE — PATIENT INSTRUCTIONS
Rosy calling questioning if she needs labs or her B12 shots in between her appointment with IV therapy and when she sees Dr Raines on 12/8.    Please give her a call to discuss.        Today August 10, 2017 you received the    Twinrix (Hepatitis A & B combo) Vaccine - Please return on 9/9/17 for your 2nd dose and 2/6/18 or later for your 3rd and final dose.    MMR (Measles Mumps Rubella) Vaccine    Typhoid - injectable. This vaccine is valid for two years.   .    These appointments can be made as a NURSE ONLY visit.    **It is very important for the vaccinations to be given on the scheduled day(s), this helps ensure you receive the full effectiveness of the vaccine.**    Please call [facility] [place] with any questions [phone]    Thank you for visiting [facility]

## 2018-02-09 ENCOUNTER — SURGERY (OUTPATIENT)
Age: 53
End: 2018-02-09
Payer: COMMERCIAL

## 2018-02-09 ENCOUNTER — HOSPITAL ENCOUNTER (OUTPATIENT)
Facility: AMBULATORY SURGERY CENTER | Age: 53
Discharge: HOME | End: 2018-02-09
Attending: INTERNAL MEDICINE | Admitting: INTERNAL MEDICINE
Payer: COMMERCIAL

## 2018-02-09 VITALS
TEMPERATURE: 98.3 F | HEIGHT: 68 IN | RESPIRATION RATE: 16 BRPM | OXYGEN SATURATION: 97 % | DIASTOLIC BLOOD PRESSURE: 83 MMHG | BODY MASS INDEX: 31.07 KG/M2 | WEIGHT: 205 LBS | SYSTOLIC BLOOD PRESSURE: 133 MMHG

## 2018-02-09 LAB — COLONOSCOPY: NORMAL

## 2018-02-09 PROCEDURE — G8918 PT W/O PREOP ORDER IV AB PRO: HCPCS

## 2018-02-09 PROCEDURE — 45378 DIAGNOSTIC COLONOSCOPY: CPT | Performed by: INTERNAL MEDICINE

## 2018-02-09 PROCEDURE — G8907 PT DOC NO EVENTS ON DISCHARG: HCPCS

## 2018-02-09 PROCEDURE — G0121 COLON CA SCRN NOT HI RSK IND: HCPCS

## 2018-02-09 RX ORDER — ONDANSETRON 2 MG/ML
4 INJECTION INTRAMUSCULAR; INTRAVENOUS
Status: DISCONTINUED | OUTPATIENT
Start: 2018-02-09 | End: 2018-02-10 | Stop reason: HOSPADM

## 2018-02-09 RX ORDER — LIDOCAINE 40 MG/G
CREAM TOPICAL
Status: DISCONTINUED | OUTPATIENT
Start: 2018-02-09 | End: 2018-02-10 | Stop reason: HOSPADM

## 2018-02-09 RX ORDER — FENTANYL CITRATE 50 UG/ML
INJECTION, SOLUTION INTRAMUSCULAR; INTRAVENOUS PRN
Status: DISCONTINUED | OUTPATIENT
Start: 2018-02-09 | End: 2018-02-09 | Stop reason: HOSPADM

## 2018-02-09 RX ADMIN — FENTANYL CITRATE 50 MCG: 50 INJECTION, SOLUTION INTRAMUSCULAR; INTRAVENOUS at 11:57

## 2018-02-09 RX ADMIN — FENTANYL CITRATE 50 MCG: 50 INJECTION, SOLUTION INTRAMUSCULAR; INTRAVENOUS at 11:58

## 2018-02-09 RX ADMIN — FENTANYL CITRATE 50 MCG: 50 INJECTION, SOLUTION INTRAMUSCULAR; INTRAVENOUS at 12:01

## 2018-02-19 ENCOUNTER — ALLIED HEALTH/NURSE VISIT (OUTPATIENT)
Dept: NURSING | Facility: CLINIC | Age: 53
End: 2018-02-19
Payer: COMMERCIAL

## 2018-02-19 DIAGNOSIS — Z71.84 TRAVEL ADVICE ENCOUNTER: ICD-10-CM

## 2018-02-19 DIAGNOSIS — Z23 NEED FOR VACCINATION: ICD-10-CM

## 2018-02-19 PROCEDURE — 90471 IMMUNIZATION ADMIN: CPT | Mod: GA

## 2018-02-19 PROCEDURE — 90636 HEP A/HEP B VACC ADULT IM: CPT | Mod: GA

## 2018-02-19 NOTE — MR AVS SNAPSHOT
After Visit Summary   2/19/2018    Nando Wolfe    MRN: [id number]           Patient Information     Date Of Birth          1965        Visit Information        Provider Department      2/19/2018 4:30 PM UP NURSE [facility]        Today's Diagnoses     Need for vaccination        Travel advice encounter           Follow-ups after your visit        Your next 10 appointments already scheduled     Feb 19, 2018  4:30 PM CST   Nurse Only with UP NURSE   [facility] ([facility])    [address]  [facility] [address] [phone]              Who to contact     If you have questions or need follow up information about today's clinic visit or your schedule please contact [facility] directly at [phone].  Normal or non-critical lab and imaging results will be communicated to you by Apcerahart, letter or phone within 4 business days after the clinic has received the results. If you do not hear from us within 7 days, please contact the clinic through Apcerahart or phone. If you have a critical or abnormal lab result, we will notify you by phone as soon as possible.  Submit refill requests through UNATION or call your pharmacy and they will forward the refill request to us. Please allow 3 business days for your refill to be completed.          Additional Information About Your Visit        MyChart Information     UNATION gives you secure access to your electronic health record. If you see a primary care provider, you can also send messages to your care team and make appointments. If you have questions, please call your primary care clinic.  If you do not have a primary care provider, please call [phone] and they will assist you.        Care EveryWhere ID     This is your Care EveryWhere ID. This could be used by other organizations to access your [facility] medical records  [id number]         Blood Pressure from Last 3 Encounters:    02/09/18 133/83   08/10/17 118/69   06/15/17 130/70    Weight from Last 3 Encounters:   02/05/18 205 lb (93 kg)   06/15/17 215 lb (97.5 kg)   12/12/12 228 lb 4 oz (103.5 kg)              We Performed the Following     HEPA/HEPB VACCINE ADULT IM     VACCINE ADMINISTRATION, INITIAL        Primary Care Provider Office Phone # Fax #    Adina [facility] [phone] [address]  [facility] [address]        Equal Access to Services     [facility]: Hadii aad ku hadasho Socharbelali, waaxda luqadaha, qaybta kaalmada adearavind, renaldo mariano . So [facility] [phone].    ATENCIÓN: Si habla español, tiene a enwman disposición servicios gratuitos de asistencia lingüística. [facility] [phone].    We comply with applicable federal civil rights laws and [place] laws. We do not discriminate on the basis of race, color, national origin, age, disability, sex, sexual orientation, or gender identity.            Thank you!     Thank you for choosing [facility]  for your care. Our goal is always to provide you with excellent care. Hearing back from our patients is one way we can continue to improve our services. Please take a few minutes to complete the written survey that you may receive in the mail after your visit with us. Thank you!             Your Updated Medication List - Protect others around you: Learn how to safely use, store and throw away your medicines at www.disposemymeds.org.      Notice  As of 2/19/2018  4:27 PM    You have not been prescribed any medications.

## 2018-02-19 NOTE — NURSING NOTE
"Chief Complaint   Patient presents with     Allied Health Visit     Imm/Inj     Twinrix      There were no vitals taken for this visit. Estimated body mass index is 31.17 kg/(m^2) as calculated from the following:    Height as of 2/5/18: 5' 8\" (1.727 m).    Weight as of 2/5/18: 205 lb (93 kg).  bp completed using cuff size: NA (Not Taken)       Health Maintenance addressed:  NONE    n/a    Carmela Robison MA     "

## 2018-02-19 NOTE — PROGRESS NOTES
Screening Questionnaire for Adult Immunization    Are you sick today?   No   Do you have allergies to medications, food, a vaccine component or latex?   No   Have you ever had a serious reaction after receiving a vaccination?   No   Do you have a long-term health problem with heart disease, lung disease, asthma, kidney disease, metabolic disease (e.g. diabetes), anemia, or other blood disorder?   No   Do you have cancer, leukemia, HIV/AIDS, or any other immune system problem?   No   In the past 3 months, have you taken medications that affect  your immune system, such as prednisone, other steroids, or anticancer drugs; drugs for the treatment of rheumatoid arthritis, Crohn s disease, or psoriasis; or have you had radiation treatments?   No   Have you had a seizure, or a brain or other nervous system problem?   No   During the past year, have you received a transfusion of blood or blood     products, or been given immune (gamma) globulin or antiviral drug?   No   For women: Are you pregnant or is there a chance you could become        pregnant during the next month?   No   Have you received any vaccinations in the past 4 weeks?   No     Immunization questionnaire answers were all negative.      Prior to injection verified patient identity using patient's name and date of birth.    Per orders of SLY Berry, injection of Twinrix given by Carmela Robison. Patient instructed to remain in clinic for 15 minutes afterwards, and to report any adverse reaction to me immediately.       Screening performed by Carmela Robison on 2/19/2018 at 4:23 PM.

## 2018-10-04 ENCOUNTER — ALLIED HEALTH/NURSE VISIT (OUTPATIENT)
Dept: PEDIATRICS | Facility: CLINIC | Age: 53
End: 2018-10-04
Payer: COMMERCIAL

## 2018-10-04 DIAGNOSIS — Z23 NEED FOR PROPHYLACTIC VACCINATION AND INOCULATION AGAINST INFLUENZA: Primary | ICD-10-CM

## 2018-10-04 PROCEDURE — 99207 ZZC NO CHARGE NURSE ONLY: CPT

## 2018-10-04 PROCEDURE — 90471 IMMUNIZATION ADMIN: CPT

## 2018-10-04 PROCEDURE — 90686 IIV4 VACC NO PRSV 0.5 ML IM: CPT

## 2018-10-04 NOTE — MR AVS SNAPSHOT
After Visit Summary   10/4/2018    Nando Wolfe    MRN: [id number]           Patient Information     Date Of Birth          1965        Visit Information        Provider Department      10/4/2018 4:30 PM MG RN VISITS [facility]        Today's Diagnoses     Need for prophylactic vaccination and inoculation against influenza    -  1       Follow-ups after your visit        Who to contact     If you have questions or need follow up information about today's clinic visit or your schedule please contact [facility] directly at [phone].  Normal or non-critical lab and imaging results will be communicated to you by Cogenicshart, letter or phone within 4 business days after the clinic has received the results. If you do not hear from us within 7 days, please contact the clinic through Intelipostt or phone. If you have a critical or abnormal lab result, we will notify you by phone as soon as possible.  Submit refill requests through BookNow or call your pharmacy and they will forward the refill request to us. Please allow 3 business days for your refill to be completed.          Additional Information About Your Visit        MyChart Information     BookNow gives you secure access to your electronic health record. If you see a primary care provider, you can also send messages to your care team and make appointments. If you have questions, please call your primary care clinic.  If you do not have a primary care provider, please call [phone] and they will assist you.      BookNow is an electronic gateway that provides easy, online access to your medical records. With BookNow, you can request a clinic appointment, read your test results, renew a prescription or communicate with your care team.     To access your existing account, please contact your [facility] or call [phone] for assistance.        Care EveryWhere ID     This is your  Care EveryWhere ID. This could be used by other organizations to access your [place] medical records  [id number]         Blood Pressure from Last 3 Encounters:   02/09/18 133/83   08/10/17 118/69   06/15/17 130/70    Weight from Last 3 Encounters:   02/05/18 205 lb (93 kg)   06/15/17 215 lb (97.5 kg)   12/12/12 228 lb 4 oz (103.5 kg)              We Performed the Following     FLU VACCINE, SPLIT VIRUS, IM (QUADRIVALENT) [40905]- >3 YRS     Vaccine Administration, Initial [67922]        Primary Care Provider Office Phone # Fax #    [facility] [phone] [address]  [facility] [address]        Equal Access to Services     [facility] : Hadii kellen cuetoo Sokathie, waaxda luqadaha, qaybta kaalmada adeegyada, renaldo mariano . So [facility] [phone].    ATENCIÓN: Si habla español, tiene a newman disposición servicios gratuitos de asistencia lingüística. Llame al [phone].    We comply with applicable federal civil rights laws and [place] laws. We do not discriminate on the basis of race, color, national origin, age, disability, sex, sexual orientation, or gender identity.            Thank you!     Thank you for choosing [facility]  for your care. Our goal is always to provide you with excellent care. Hearing back from our patients is one way we can continue to improve our services. Please take a few minutes to complete the written survey that you may receive in the mail after your visit with us. Thank you!             Your Updated Medication List - Protect others around you: Learn how to safely use, store and throw away your medicines at www.disposemymeds.org.      Notice  As of 10/4/2018  4:49 PM    You have not been prescribed any medications.

## 2018-10-04 NOTE — PROGRESS NOTES

## 2019-10-25 ENCOUNTER — ALLIED HEALTH/NURSE VISIT (OUTPATIENT)
Dept: PEDIATRICS | Facility: CLINIC | Age: 54
End: 2019-10-25
Payer: COMMERCIAL

## 2019-10-25 DIAGNOSIS — Z23 NEED FOR PROPHYLACTIC VACCINATION AND INOCULATION AGAINST INFLUENZA: Primary | ICD-10-CM

## 2019-10-25 PROCEDURE — 99207 ZZC NO CHARGE NURSE ONLY: CPT

## 2019-10-25 PROCEDURE — 90471 IMMUNIZATION ADMIN: CPT

## 2019-10-25 PROCEDURE — 90682 RIV4 VACC RECOMBINANT DNA IM: CPT

## 2020-03-01 ENCOUNTER — HEALTH MAINTENANCE LETTER (OUTPATIENT)
Age: 55
End: 2020-03-01

## 2021-04-17 ENCOUNTER — HEALTH MAINTENANCE LETTER (OUTPATIENT)
Age: 56
End: 2021-04-17

## 2021-04-23 ENCOUNTER — IMMUNIZATION (OUTPATIENT)
Dept: PEDIATRICS | Facility: CLINIC | Age: 56
End: 2021-04-23
Payer: COMMERCIAL

## 2021-04-23 PROCEDURE — 91300 PR COVID VAC PFIZER DIL RECON 30 MCG/0.3 ML IM: CPT

## 2021-04-23 PROCEDURE — 0001A PR COVID VAC PFIZER DIL RECON 30 MCG/0.3 ML IM: CPT

## 2021-05-14 ENCOUNTER — IMMUNIZATION (OUTPATIENT)
Dept: PEDIATRICS | Facility: CLINIC | Age: 56
End: 2021-05-14
Attending: INTERNAL MEDICINE
Payer: COMMERCIAL

## 2021-05-14 PROCEDURE — 91300 PR COVID VAC PFIZER DIL RECON 30 MCG/0.3 ML IM: CPT

## 2021-05-14 PROCEDURE — 0002A PR COVID VAC PFIZER DIL RECON 30 MCG/0.3 ML IM: CPT

## 2021-10-02 ENCOUNTER — HEALTH MAINTENANCE LETTER (OUTPATIENT)
Age: 56
End: 2021-10-02

## 2022-01-21 ENCOUNTER — IMMUNIZATION (OUTPATIENT)
Dept: NURSING | Facility: CLINIC | Age: 57
End: 2022-01-21
Payer: COMMERCIAL

## 2022-01-21 PROCEDURE — 0054A COVID-19,PF,PFIZER (12+ YRS): CPT

## 2022-01-21 PROCEDURE — 91305 COVID-19,PF,PFIZER (12+ YRS): CPT

## 2022-05-14 ENCOUNTER — HEALTH MAINTENANCE LETTER (OUTPATIENT)
Age: 57
End: 2022-05-14

## 2022-09-03 ENCOUNTER — HEALTH MAINTENANCE LETTER (OUTPATIENT)
Age: 57
End: 2022-09-03

## 2022-09-10 ENCOUNTER — OFFICE VISIT (OUTPATIENT)
Dept: URGENT CARE | Facility: URGENT CARE | Age: 57
End: 2022-09-10
Payer: COMMERCIAL

## 2022-09-10 VITALS
BODY MASS INDEX: 33.45 KG/M2 | DIASTOLIC BLOOD PRESSURE: 81 MMHG | HEART RATE: 74 BPM | OXYGEN SATURATION: 97 % | WEIGHT: 220 LBS | RESPIRATION RATE: 16 BRPM | TEMPERATURE: 97.8 F | SYSTOLIC BLOOD PRESSURE: 149 MMHG

## 2022-09-10 DIAGNOSIS — M79.662 PAIN AND SWELLING OF LEFT LOWER LEG: Primary | ICD-10-CM

## 2022-09-10 DIAGNOSIS — M79.89 PAIN AND SWELLING OF LEFT LOWER LEG: Primary | ICD-10-CM

## 2022-09-10 DIAGNOSIS — M79.89 CALF SWELLING: ICD-10-CM

## 2022-09-10 PROCEDURE — 99204 OFFICE O/P NEW MOD 45 MIN: CPT | Performed by: INTERNAL MEDICINE

## 2022-09-10 RX ORDER — IBUPROFEN 200 MG
200 TABLET ORAL EVERY 4 HOURS PRN
COMMUNITY
End: 2022-09-14

## 2022-09-10 ASSESSMENT — ENCOUNTER SYMPTOMS
PALPITATIONS: 0
SHORTNESS OF BREATH: 0

## 2022-09-10 NOTE — PROGRESS NOTES
"ASSESSMENT AND PLAN:      ICD-10-CM    1. Pain and swelling of left lower leg  M79.662     M79.89    2. Calf swelling  M79.89      Differential Diagnosis: Greatest concern for DVT, no significant symptoms for PE  Discussed if muscle injury would expect bruising      Patient Instructions       Due to swelling & pain of calf with significant difference in size noted on exam, you are referred to ER this Saturday morning for evaluation possible blood clot, deep vein thrombosis       Liv Dyer MD  [facility] URGENT [facility]ARE    Subjective     Nando Wolfe is a 57 year old who presents for Patient presents with:  Urgent Care: Urgent care visit for swelling/pain in calf area.    Musculoskeletal Problem: Swelling/pain of Lft calf area since this past Monday. It is gotten worse. He has tried to ice the area and that makes it a little more \"spongy\" but the pain and swelling is still there. He was using his fitness bike but wasn't sure if he got injured.    Swelling: Swelling of Lft calf area that is now moving down to his left foot.    a new patient of [facility].       left calf area with pain and swelling for past 6 days  Worsening and now involving left  Context:   Rides incumbent bike, but no injury recalled     Current and Associated symptoms: Pain and Swelling  Denies  Bruising  Therapies to improve symptoms include: ice  This is the first time this type of problem has occurred for this patient.       Review of Systems   Respiratory: Negative for shortness of breath.    Cardiovascular: Negative for chest pain and palpitations.           Objective    BP (!) 149/81 (BP Location: Left arm, Patient Position: Sitting, Cuff Size: Adult Regular)   Pulse 74   Temp 97.8  F (36.6  C) (Tympanic)   Resp 16   Wt 99.8 kg (220 lb)   SpO2 97%   BMI 33.45 kg/m    Physical Exam  Vitals reviewed.   Constitutional:       Appearance: Normal appearance.   Cardiovascular:      Rate and Rhythm: Normal rate and regular " rhythm.      Pulses: Normal pulses.      Heart sounds: Normal heart sounds.   Pulmonary:      Effort: Pulmonary effort is normal.      Breath sounds: Normal breath sounds.   Neurological:      Mental Status: He is alert.       There is significant size difference between left lower extremity and right.  No redness or ecchymosis of skin noted

## 2022-09-10 NOTE — PATIENT INSTRUCTIONS
Due to swelling & pain of calf with significant difference in size noted on exam, you are referred to ER this Saturday morning for evaluation possible blood clot, deep vein thrombosis

## 2022-09-14 ENCOUNTER — TELEPHONE (OUTPATIENT)
Dept: FAMILY MEDICINE | Facility: CLINIC | Age: 57
End: 2022-09-14

## 2022-09-14 ENCOUNTER — ANTICOAGULATION THERAPY VISIT (OUTPATIENT)
Dept: ANTICOAGULATION | Facility: CLINIC | Age: 57
End: 2022-09-14

## 2022-09-14 ENCOUNTER — MYC MEDICAL ADVICE (OUTPATIENT)
Dept: FAMILY MEDICINE | Facility: CLINIC | Age: 57
End: 2022-09-14

## 2022-09-14 ENCOUNTER — OFFICE VISIT (OUTPATIENT)
Dept: FAMILY MEDICINE | Facility: CLINIC | Age: 57
End: 2022-09-14
Payer: COMMERCIAL

## 2022-09-14 VITALS
WEIGHT: 220.6 LBS | SYSTOLIC BLOOD PRESSURE: 138 MMHG | RESPIRATION RATE: 11 BRPM | DIASTOLIC BLOOD PRESSURE: 79 MMHG | OXYGEN SATURATION: 98 % | BODY MASS INDEX: 33.43 KG/M2 | HEIGHT: 68 IN | HEART RATE: 78 BPM | TEMPERATURE: 98.1 F

## 2022-09-14 DIAGNOSIS — I82.402 ACUTE DEEP VEIN THROMBOSIS (DVT) OF LEFT LOWER EXTREMITY, UNSPECIFIED VEIN (H): Primary | ICD-10-CM

## 2022-09-14 DIAGNOSIS — Z79.899 HIGH RISK MEDICATION USE: ICD-10-CM

## 2022-09-14 PROCEDURE — 99213 OFFICE O/P EST LOW 20 MIN: CPT | Performed by: INTERNAL MEDICINE

## 2022-09-14 RX ORDER — WARFARIN SODIUM 5 MG/1
5 TABLET ORAL DAILY
Qty: 90 TABLET | Refills: 3 | Status: SHIPPED | OUTPATIENT
Start: 2022-09-14 | End: 2022-12-02

## 2022-09-14 ASSESSMENT — PAIN SCALES - GENERAL: PAINLEVEL: MILD PAIN (2)

## 2022-09-14 NOTE — PROGRESS NOTES
Talked with Nando. He currently has 20+ days worth of 30 day supply of Eliquis. He states he was unsure if it was his choice to stay on eliquis, or if the doctor recommended switching to warfarin. He will message care team for clarification, will leave new enrollment open to follow up on 9/15/2022

## 2022-09-14 NOTE — PROGRESS NOTES
Nando is a 57 year old male, presenting for the following health issues:    Hospital Follow-up Visit:    Hospital/Nursing Home/IP Rehab Facility: [facility]  Date of Admission: 09/10/2022  Date of Discharge: 09/12/2022  Reason(s) for Admission: DVT femoral with thrombophlebitis left    Was your hospitalization related to COVID-19? No   Problems taking medications regularly:  None  Medication changes since discharge: None  Problems adhering to non-medication therapy:  None    Summary of hospitalization:  CareEverywhere information obtained and reviewed  Diagnostic Tests/Treatments reviewed.  Follow up needed: Yes.  Other Healthcare Providers Involved in Patient s Care:         None  Update since discharge: improved.    Plan of care communicated with patient  Precipitating factor: venous stasis due to prolonged driving during Labor Day. 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   Family hx: negative   Status: left leg is store.  Concerns: risk of bleeding    Other concerns:  -hyperglycemia  -how long do I have to sit and stand?    DIAGNOSTICS    US VENOUS LOWER EXT LEFT    Anatomical Region Laterality Modality   Extremity -- Ultrasound       Impression    IMPRESSION: Occlusive acute appearing deep venous thrombosis throughout the femoral vein in the upper, mid and distal thigh extending into the popliteal vein and calf veins.     No DVT in the common femoral vein or profunda femoris.     Technologist communicated these findings to the triage nurse to expedite patient status.     REPORT SIGNED BY DR. Raul Valdez      PHOSPHOLIPID IGG <20 GPL-U/mL <2    PHOSPHOLIPID IGG QUALITATIVE  Negative    PHOSPHOLIPID IGM <20 MPL-U/mL 2    PHOSPHOLIPID IGM QUALITATIVE  Negative    Resulting Agency  [facility]   Specimen Collected: 09/10/22 12:47 PM Last Resulted: 09/13/22 12:19 PM         ASSESSMENT/PLAN  Acute deep vein thrombosis (DVT) of left  lower extremity, unspecified vein (H)  - Hemoglobin; Standing  - Anticoagulation Clinic Referral  - warfarin ANTICOAGULANT (COUMADIN) 5 MG tablet; Take 1 tablet (5 mg) by mouth daily  - US Lower Extremity Venous Duplex Left; Future    High risk medication use  - Hemoglobin; Standing    Disposition:  Follow-up in 4 weeks.    Santos Duenas MD  Internal Medicine

## 2022-09-15 ENCOUNTER — TELEPHONE (OUTPATIENT)
Dept: FAMILY MEDICINE | Facility: CLINIC | Age: 57
End: 2022-09-15

## 2022-09-15 NOTE — TELEPHONE ENCOUNTER
Dr. Duenas,  Would you like patient to start warfarin right away or transition to warfarin as he runs out of Eliquis. He has about 20 days left of Eliquis.    Orin Glaser RN - [facility]ticoagulation [facility]

## 2022-09-15 NOTE — TELEPHONE ENCOUNTER
Reason for Call:  Other call back    Detailed comments: Pt would like call back from Lisa PLUMMER nurse. Unable to reach nurse at this time.    Phone Number Patient can be reached at: Cell number on file:    Telephone Information:   Mobile [phone]       Best Time: na    Can we leave a detailed message on this number? Not Applicable    Call taken on 9/15/2022 at 10:43 AM by Reina Boswell

## 2022-09-15 NOTE — TELEPHONE ENCOUNTER
Reason for Call:  Other call back    Detailed comments: PT IS REQUESTING A CALL BACK FROM MAHNAZ HESTER. PT WOULD NOT SAY WHAT IT WAS REGARDING - JUST PREFERS TO SPEAK WITH HER DIRECTLY.     Phone Number Patient can be reached at: Cell number on file:    Telephone Information:   Mobile [phone]       Best Time: ANYTIME    Can we leave a detailed message on this number? YES    Call taken on 9/15/2022 at 12:50 PM by Marielos Banerjee

## 2022-09-15 NOTE — PROGRESS NOTES
Will start taking Warfarin tonight or tomorrow, overlapping with Eliquis until INR is 2.5. 1st 2 weeks of appointments set up.    Orin Glaser RN - [facility]tico[facility]

## 2022-09-15 NOTE — TELEPHONE ENCOUNTER
Patient confirming he is to take eliquis and warfarin at the same time. Confirmed that he will be on both until his INR is 2.5 then will stop the charlesquis    Orin Glaser RN - [facility] Anticoagulation [facility]

## 2022-09-19 ENCOUNTER — ANTICOAGULATION THERAPY VISIT (OUTPATIENT)
Dept: ANTICOAGULATION | Facility: CLINIC | Age: 57
End: 2022-09-19

## 2022-09-19 ENCOUNTER — LAB (OUTPATIENT)
Dept: LAB | Facility: CLINIC | Age: 57
End: 2022-09-19
Payer: COMMERCIAL

## 2022-09-19 ENCOUNTER — TELEPHONE (OUTPATIENT)
Dept: PEDIATRICS | Facility: CLINIC | Age: 57
End: 2022-09-19

## 2022-09-19 DIAGNOSIS — I82.402 ACUTE DEEP VEIN THROMBOSIS (DVT) OF LEFT LOWER EXTREMITY, UNSPECIFIED VEIN (H): ICD-10-CM

## 2022-09-19 DIAGNOSIS — Z79.01 LONG TERM CURRENT USE OF ANTICOAGULANT THERAPY: Primary | ICD-10-CM

## 2022-09-19 DIAGNOSIS — Z79.01 LONG TERM CURRENT USE OF ANTICOAGULANT THERAPY: ICD-10-CM

## 2022-09-19 DIAGNOSIS — I82.402 ACUTE DEEP VEIN THROMBOSIS (DVT) OF LEFT LOWER EXTREMITY, UNSPECIFIED VEIN (H): Primary | ICD-10-CM

## 2022-09-19 LAB — INR BLD: 1.5 (ref 0.9–1.1)

## 2022-09-19 PROCEDURE — 36416 COLLJ CAPILLARY BLOOD SPEC: CPT

## 2022-09-19 PROCEDURE — 85610 PROTHROMBIN TIME: CPT

## 2022-09-19 NOTE — TELEPHONE ENCOUNTER
Reason for Call:  Other appointment and call back    Detailed comments: Patient called from the [facility] lab as he is schedule for an INR lab appointment this morning.  Patient states lab does not have orders for his INR and they are requesting orders be placed.  Writer did not see INR standing order placed.      Phone Number Patient can be reached at: Cell number on file:    Telephone Information:   Mobile [phone]       Best Time: any    Can we leave a detailed message on this number? YES    Call taken on 9/19/2022 at 7:58 AM by Ida Kovacs

## 2022-09-19 NOTE — PROGRESS NOTES
ANTICOAGULATION MANAGEMENT     Nando CHRISTIANSON Riciarra 57 year old male is on warfarin with subtherapeutic INR result. (Goal INR 2.0-3.0)    Recent labs: (last 7 days)     09/19/22  0844   INR 1.5*       ASSESSMENT       Source(s): Chart Review and Patient/Caregiver Call       Warfarin doses taken: Warfarin taken as instructed    Diet: No new diet changes identified    New illness, injury, or hospitalization: No    Medication/supplement changes: None noted    Signs or symptoms of bleeding or clotting: No    Previous INR: new start    Additional findings: New start on day 5 of warfarin and bridging with Eliquis. Today is the start of 1 tablet, twice a day ( has been 2 tablets twice a day)       PLAN     Recommended plan for no diet, medication or health factor changes affecting INR     Dosing Instructions: Increase your warfarin dose (7.1% change) with next INR in 4   days       Summary  As of 9/19/2022    Full warfarin instructions:  7.5 mg every Mon; 5 mg all other days   Next INR check:  9/23/2022             Telephone call with Nando who verbalizes understanding and agrees to plan    Lab visit scheduled    Education provided: Importance of consistent vitamin K intake, Impact of vitamin K foods on INR, Vitamin K content of foods, Goal range and significance of current result, Importance of therapeutic range, Importance of following up at instructed interval, Monitoring for bleeding signs and symptoms, Monitoring for clotting signs and symptoms, When to seek medical attention/emergency care and Contact [phone]  with any changes, questions or concerns.     Plan made per [facility] anticoagulation protocol    Lisa Laurent RN  Anticoagulation Clinic  9/19/2022    _______________________________________________________________________     Anticoagulation Episode Summary     Current INR goal:  2.0-3.0   TTR:  --   Target end date:  3/14/2023   Send INR reminders to:  SHAW WAGGONER    Indications    Acute deep vein  thrombosis (DVT) of left lower extremity  unspecified vein (H) [I82.402]           Comments:           Anticoagulation Care Providers     Provider Role Specialty Phone number    Santos Duenas MD Referring Internal Medicine [phone]

## 2022-09-23 ENCOUNTER — LAB (OUTPATIENT)
Dept: LAB | Facility: CLINIC | Age: 57
End: 2022-09-23
Payer: COMMERCIAL

## 2022-09-23 ENCOUNTER — ANTICOAGULATION THERAPY VISIT (OUTPATIENT)
Dept: ANTICOAGULATION | Facility: CLINIC | Age: 57
End: 2022-09-23

## 2022-09-23 DIAGNOSIS — I82.402 ACUTE DEEP VEIN THROMBOSIS (DVT) OF LEFT LOWER EXTREMITY, UNSPECIFIED VEIN (H): Primary | ICD-10-CM

## 2022-09-23 DIAGNOSIS — I82.402 ACUTE DEEP VEIN THROMBOSIS (DVT) OF LEFT LOWER EXTREMITY, UNSPECIFIED VEIN (H): ICD-10-CM

## 2022-09-23 DIAGNOSIS — Z79.01 LONG TERM CURRENT USE OF ANTICOAGULANT THERAPY: ICD-10-CM

## 2022-09-23 LAB — INR BLD: 2.3 (ref 0.9–1.1)

## 2022-09-23 PROCEDURE — 36416 COLLJ CAPILLARY BLOOD SPEC: CPT

## 2022-09-23 PROCEDURE — 85610 PROTHROMBIN TIME: CPT

## 2022-09-23 NOTE — PROGRESS NOTES
ANTICOAGULATION MANAGEMENT     Nando Wolfe 57 year old male is on warfarin with therapeutic INR result. (Goal INR 2.0-3.0)    Recent labs: (last 7 days)     09/23/22  0712   INR 2.3*       ASSESSMENT       Source(s): Chart Review and Patient/Caregiver Call       Warfarin doses taken: Warfarin taken as instructed    Diet: No new diet changes identified    New illness, injury, or hospitalization: No    Medication/supplement changes: on eliquis bridge until inr above 2.5    Signs or symptoms of bleeding or clotting: No    Previous INR: Subtherapeutic    Additional findings: New start on day 9 of warfarin       PLAN     Recommended plan for no diet, medication or health factor changes affecting INR     Dosing Instructions: Continue your current warfarin dose Continue bridging with Eliquis with next INR in 3 days       Summary  As of 9/23/2022    Full warfarin instructions:  7.5 mg every Mon; 5 mg all other days   Next INR check:               Telephone call with Nando who verbalizes understanding and agrees to plan    Lab visit scheduled    Education provided: Please call back if any changes to your diet, medications or how you've been taking warfarin    Plan made per [facility] anticoagulation protocol    Noemi Schwarz RN  Anticoagulation Clinic  9/23/2022    _______________________________________________________________________     Anticoagulation Episode Summary     Current INR goal:  2.0-3.0   TTR:  --   Target end date:  3/14/2023   Send INR reminders to:  SHAW WAGGONER    Indications    Acute deep vein thrombosis (DVT) of left lower extremity  unspecified vein (H) [I82.402]           Comments:           Anticoagulation Care Providers     Provider Role Specialty Phone number    Vocal, Santos Quintanilla MD Referring Internal Medicine [phone]

## 2022-09-26 ENCOUNTER — LAB (OUTPATIENT)
Dept: LAB | Facility: CLINIC | Age: 57
End: 2022-09-26
Payer: COMMERCIAL

## 2022-09-26 ENCOUNTER — ANTICOAGULATION THERAPY VISIT (OUTPATIENT)
Dept: ANTICOAGULATION | Facility: CLINIC | Age: 57
End: 2022-09-26

## 2022-09-26 DIAGNOSIS — Z79.01 LONG TERM CURRENT USE OF ANTICOAGULANT THERAPY: ICD-10-CM

## 2022-09-26 DIAGNOSIS — I82.402 ACUTE DEEP VEIN THROMBOSIS (DVT) OF LEFT LOWER EXTREMITY, UNSPECIFIED VEIN (H): Primary | ICD-10-CM

## 2022-09-26 DIAGNOSIS — I82.402 ACUTE DEEP VEIN THROMBOSIS (DVT) OF LEFT LOWER EXTREMITY, UNSPECIFIED VEIN (H): ICD-10-CM

## 2022-09-26 LAB — INR BLD: 2.9 (ref 0.9–1.1)

## 2022-09-26 PROCEDURE — 85610 PROTHROMBIN TIME: CPT

## 2022-09-26 PROCEDURE — 36416 COLLJ CAPILLARY BLOOD SPEC: CPT

## 2022-09-26 NOTE — PROGRESS NOTES
ANTICOAGULATION MANAGEMENT     Nando Wolfe 57 year old male is on warfarin with therapeutic INR result. (Goal INR 2.0-3.0)    Recent labs: (last 7 days)     09/26/22  0731   INR 2.9*       ASSESSMENT       Source(s): Chart Review and Patient/Caregiver Call       Warfarin doses taken: Warfarin taken as instructed    Diet: No new diet changes identified    New illness, injury, or hospitalization: No    Medication/supplement changes: None noted    Signs or symptoms of bleeding or clotting: No    Previous INR: Therapeutic last visit; previously outside of goal range    Additional findings: New start on day 13 of warfarin and Bridging with eliquis until INR >= 2.5 /ok to stop eliquis.       PLAN     Recommended plan for no diet, medication or health factor changes affecting INR     Dosing Instructions: Continue your current warfarin dose with next INR in 3 days       Summary  As of 9/26/2022    Full warfarin instructions:  7.5 mg every Mon; 5 mg all other days   Next INR check:  9/29/2022             Telephone call with Jim who verbalizes understanding and agrees to plan    Lab visit scheduled    Education provided: Please call back if any changes to your diet, medications or how you've been taking warfarin and Importance of consistent vitamin K intake    Plan made per [facility] anticoagulation protocol    Noemi Schwarz RN  Anticoagulation Clinic  9/26/2022    _______________________________________________________________________     Anticoagulation Episode Summary     Current INR goal:  2.0-3.0   TTR:  100.0 % (2 d)   Target end date:  3/14/2023   Send INR reminders to:  SHAW WAGGONER    Indications    Acute deep vein thrombosis (DVT) of left lower extremity  unspecified vein (H) [I82.402]           Comments:           Anticoagulation Care Providers     Provider Role Specialty Phone number    VocalSantos MD Referring Internal Medicine [phone]

## 2022-09-29 ENCOUNTER — LAB (OUTPATIENT)
Dept: LAB | Facility: CLINIC | Age: 57
End: 2022-09-29
Payer: COMMERCIAL

## 2022-09-29 ENCOUNTER — ANTICOAGULATION THERAPY VISIT (OUTPATIENT)
Dept: ANTICOAGULATION | Facility: CLINIC | Age: 57
End: 2022-09-29

## 2022-09-29 DIAGNOSIS — Z79.01 LONG TERM CURRENT USE OF ANTICOAGULANT THERAPY: ICD-10-CM

## 2022-09-29 DIAGNOSIS — I82.402 ACUTE DEEP VEIN THROMBOSIS (DVT) OF LEFT LOWER EXTREMITY, UNSPECIFIED VEIN (H): ICD-10-CM

## 2022-09-29 DIAGNOSIS — I82.402 ACUTE DEEP VEIN THROMBOSIS (DVT) OF LEFT LOWER EXTREMITY, UNSPECIFIED VEIN (H): Primary | ICD-10-CM

## 2022-09-29 LAB — INR BLD: 2.9 (ref 0.9–1.1)

## 2022-09-29 PROCEDURE — 85610 PROTHROMBIN TIME: CPT

## 2022-09-29 PROCEDURE — 36416 COLLJ CAPILLARY BLOOD SPEC: CPT

## 2022-09-29 NOTE — PROGRESS NOTES
ANTICOAGULATION MANAGEMENT     Nando Wolfe 57 year old male is on warfarin with therapeutic INR result. (Goal INR 2.0-3.0)    Recent labs: (last 7 days)     09/29/22  0712   INR 2.9*       ASSESSMENT       Source(s): Chart Review and Patient/Caregiver Call       Warfarin doses taken: Warfarin taken as instructed    Diet: has had 2 servings of broccoli since Monday's INR and plans to stick with twice weekly broccoli for now until INR is stable. Avoiding alcohol at this time.    New illness, injury, or hospitalization: No    Medication/supplement changes: None noted    Signs or symptoms of bleeding or clotting: No    Previous INR: Therapeutic last 2(+) visits    Additional findings: Eliquis was discontinued on Monday. Will be traveling over the weekend.       PLAN     Recommended plan for no diet, medication or health factor changes affecting INR     Dosing Instructions: Continue your current warfarin dose with next INR in 6 days       Summary  As of 9/29/2022    Full warfarin instructions:  7.5 mg every Mon; 5 mg all other days   Next INR check:  10/5/2022             Telephone call with Jim who verbalizes understanding and agrees to plan    Lab visit scheduled    Education provided: Importance of consistent vitamin K intake, Impact of vitamin K foods on INR, Potential interaction between warfarin and alcohol and Goal range and significance of current result    Plan made per [facility] anticoagulation protocol    Karlie Abrams RN  Anticoagulation Clinic  9/29/2022    _______________________________________________________________________     Anticoagulation Episode Summary     Current INR goal:  2.0-3.0   TTR:  100.0 % (5 d)   Target end date:  3/14/2023   Send INR reminders to:  SHAW WAGGONER    Indications    Acute deep vein thrombosis (DVT) of left lower extremity  unspecified vein (H) [I82.402]           Comments:           Anticoagulation Care Providers     Provider Role Specialty Phone number     Vocal, Santos Quintanilla MD Referring Internal Medicine [phone]

## 2022-10-05 ENCOUNTER — ANTICOAGULATION THERAPY VISIT (OUTPATIENT)
Dept: ANTICOAGULATION | Facility: CLINIC | Age: 57
End: 2022-10-05

## 2022-10-05 ENCOUNTER — LAB (OUTPATIENT)
Dept: LAB | Facility: CLINIC | Age: 57
End: 2022-10-05
Payer: COMMERCIAL

## 2022-10-05 DIAGNOSIS — Z79.01 LONG TERM CURRENT USE OF ANTICOAGULANT THERAPY: ICD-10-CM

## 2022-10-05 DIAGNOSIS — I82.402 ACUTE DEEP VEIN THROMBOSIS (DVT) OF LEFT LOWER EXTREMITY, UNSPECIFIED VEIN (H): Primary | ICD-10-CM

## 2022-10-05 DIAGNOSIS — I82.402 ACUTE DEEP VEIN THROMBOSIS (DVT) OF LEFT LOWER EXTREMITY, UNSPECIFIED VEIN (H): ICD-10-CM

## 2022-10-05 LAB — INR BLD: 3.7 (ref 0.9–1.1)

## 2022-10-05 PROCEDURE — 85610 PROTHROMBIN TIME: CPT

## 2022-10-05 PROCEDURE — 36416 COLLJ CAPILLARY BLOOD SPEC: CPT

## 2022-10-05 NOTE — PROGRESS NOTES
ANTICOAGULATION MANAGEMENT     Nando Wolfe 57 year old male is on warfarin with supratherapeuticsupratherapeutic INR result. (Goal INR 2.0-3.0)    Recent labs: (last 7 days)     10/05/22  0714   INR 3.7*       ASSESSMENT       Source(s): Chart Review and Patient/Caregiver Call       Warfarin doses taken: Warfarin taken as instructed    Diet: Pt reports he has been consistent with 1 cup of broccoli 2/week, he plans to stick with that amount. No alcohol intake     New illness, injury, or hospitalization: No    Medication/supplement changes: None noted    Signs or symptoms of bleeding or clotting: No    Previous INR: Therapeutic last 2(+) visits    Additional findings: None       PLAN     Recommended plan for no diet, medication or health factor changes affecting INR     Dosing Instructions: partial hold then decrease your warfarin dose (13.3% change) with next INR in 5-7 days       Summary  As of 10/5/2022    Full warfarin instructions:  10/5: 2.5 mg; Otherwise 2.5 mg every Fri; 5 mg all other days   Next INR check:  10/10/2022             Telephone call with Jim who verbalizes understanding and agrees to plan    Lab visit scheduled    Education provided: Importance of consistent vitamin K intake, Impact of vitamin K foods on INR, Goal range and significance of current result and Importance of therapeutic range    Plan made with [facility] Pharmacist Mara Mary, RN  Anticoagulation Clinic  10/5/2022    _______________________________________________________________________     Anticoagulation Episode Summary     Current INR goal:  2.0-3.0   TTR:  53.5 % (1.6 wk)   Target end date:  3/14/2023   Send INR reminders to:  SHAW WAGGONER    Indications    Acute deep vein thrombosis (DVT) of left lower extremity  unspecified vein (H) [I82.402]           Comments:           Anticoagulation Care Providers     Provider Role Specialty Phone number    Santos Duenas MD Referring Internal Medicine  [phone]

## 2022-10-10 ENCOUNTER — ANTICOAGULATION THERAPY VISIT (OUTPATIENT)
Dept: ANTICOAGULATION | Facility: CLINIC | Age: 57
End: 2022-10-10

## 2022-10-10 ENCOUNTER — LAB (OUTPATIENT)
Dept: LAB | Facility: CLINIC | Age: 57
End: 2022-10-10
Payer: COMMERCIAL

## 2022-10-10 DIAGNOSIS — I82.402 ACUTE DEEP VEIN THROMBOSIS (DVT) OF LEFT LOWER EXTREMITY, UNSPECIFIED VEIN (H): Primary | ICD-10-CM

## 2022-10-10 DIAGNOSIS — Z79.01 LONG TERM CURRENT USE OF ANTICOAGULANT THERAPY: ICD-10-CM

## 2022-10-10 DIAGNOSIS — I82.402 ACUTE DEEP VEIN THROMBOSIS (DVT) OF LEFT LOWER EXTREMITY, UNSPECIFIED VEIN (H): ICD-10-CM

## 2022-10-10 LAB — INR BLD: 2.4 (ref 0.9–1.1)

## 2022-10-10 PROCEDURE — 85610 PROTHROMBIN TIME: CPT

## 2022-10-10 PROCEDURE — 36416 COLLJ CAPILLARY BLOOD SPEC: CPT

## 2022-10-10 NOTE — PROGRESS NOTES
ANTICOAGULATION MANAGEMENT     Nando Wolfe 57 year old male is on warfarin with therapeutic INR result. (Goal INR 2.0-3.0)    Recent labs: (last 7 days)     10/10/22  0726   INR 2.4*       ASSESSMENT       Source(s): Chart Review and Patient/Caregiver Call       Warfarin doses taken: Warfarin taken as instructed    Diet: No new diet changes identified    New illness, injury, or hospitalization: No    Medication/supplement changes: None noted    Signs or symptoms of bleeding or clotting: No    Previous INR: Supratherapeutic    Additional findings: None       PLAN     Recommended plan for no diet, medication or health factor changes affecting INR     Dosing Instructions: Continue your current warfarin dose with next INR in 3 days       Summary  As of 10/10/2022    Full warfarin instructions:  2.5 mg every Fri; 5 mg all other days   Next INR check:  10/13/2022             Telephone call with Jim who verbalizes understanding and agrees to plan    Lab visit scheduled    Education provided: Importance of consistent vitamin K intake, Monitoring for bleeding signs and symptoms, Monitoring for clotting signs and symptoms and Contact [phone]  with any changes, questions or concerns.     Plan made per [facility] anticoagulation protocol    Karen Antoine RN  Anticoagulation Clinic  10/10/2022    _______________________________________________________________________     Anticoagulation Episode Summary     Current INR goal:  2.0-3.0   TTR:  51.3 % (2.3 wk)   Target end date:  3/14/2023   Send INR reminders to:  SHAW WAGGONER    Indications    Acute deep vein thrombosis (DVT) of left lower extremity  unspecified vein (H) [I82.402]           Comments:           Anticoagulation Care Providers     Provider Role Specialty Phone number    Santos Duenas MD Referring Internal Medicine [phone]

## 2022-10-13 ENCOUNTER — ANTICOAGULATION THERAPY VISIT (OUTPATIENT)
Dept: ANTICOAGULATION | Facility: CLINIC | Age: 57
End: 2022-10-13

## 2022-10-13 ENCOUNTER — LAB (OUTPATIENT)
Dept: LAB | Facility: CLINIC | Age: 57
End: 2022-10-13
Payer: COMMERCIAL

## 2022-10-13 DIAGNOSIS — Z79.01 LONG TERM CURRENT USE OF ANTICOAGULANT THERAPY: ICD-10-CM

## 2022-10-13 DIAGNOSIS — I82.402 ACUTE DEEP VEIN THROMBOSIS (DVT) OF LEFT LOWER EXTREMITY, UNSPECIFIED VEIN (H): ICD-10-CM

## 2022-10-13 DIAGNOSIS — I82.402 ACUTE DEEP VEIN THROMBOSIS (DVT) OF LEFT LOWER EXTREMITY, UNSPECIFIED VEIN (H): Primary | ICD-10-CM

## 2022-10-13 LAB — INR BLD: 2.3 (ref 0.9–1.1)

## 2022-10-13 PROCEDURE — 85610 PROTHROMBIN TIME: CPT

## 2022-10-13 PROCEDURE — 36415 COLL VENOUS BLD VENIPUNCTURE: CPT

## 2022-10-13 NOTE — PROGRESS NOTES
ANTICOAGULATION MANAGEMENT     Nando Wolfe 57 year old male is on warfarin with therapeutic INR result. (Goal INR 2.0-3.0)    Recent labs: (last 7 days)     10/13/22  0726   INR 2.3*       ASSESSMENT       Source(s): Chart Review and Patient/Caregiver Call       Warfarin doses taken: Warfarin taken as instructed    Diet: No new diet changes identified    New illness, injury, or hospitalization: No    Medication/supplement changes: None noted    Signs or symptoms of bleeding or clotting: No    Previous INR: Therapeutic last visit; previously outside of goal range    Additional findings: None       PLAN     Recommended plan for no diet, medication or health factor changes affecting INR     Dosing Instructions: Continue your current warfarin dose with next INR in 1 week       Summary  As of 10/13/2022    Full warfarin instructions:  2.5 mg every Fri; 5 mg all other days   Next INR check:  10/20/2022             Telephone call with Jim who verbalizes understanding and agrees to plan and who agrees to plan and repeated back plan correctly    Lab visit scheduled    Education provided: Please call back if any changes to your diet, medications or how you've been taking warfarin    Plan made per [facility] anticoagulation protocol    Gisella Khan, RN  Anticoagulation Clinic  10/13/2022    _______________________________________________________________________     Anticoagulation Episode Summary     Current INR goal:  2.0-3.0   TTR:  58.8 % (2.7 wk)   Target end date:  3/14/2023   Send INR reminders to:  SHAW WAGGONER    Indications    Acute deep vein thrombosis (DVT) of left lower extremity  unspecified vein (H) [I82.402]           Comments:           Anticoagulation Care Providers     Provider Role Specialty Phone number    VocalSantos MD Referring Internal Medicine [phone]

## 2022-10-20 ENCOUNTER — ANTICOAGULATION THERAPY VISIT (OUTPATIENT)
Dept: ANTICOAGULATION | Facility: CLINIC | Age: 57
End: 2022-10-20

## 2022-10-20 ENCOUNTER — LAB (OUTPATIENT)
Dept: LAB | Facility: CLINIC | Age: 57
End: 2022-10-20
Payer: COMMERCIAL

## 2022-10-20 DIAGNOSIS — I82.402 ACUTE DEEP VEIN THROMBOSIS (DVT) OF LEFT LOWER EXTREMITY, UNSPECIFIED VEIN (H): Primary | ICD-10-CM

## 2022-10-20 DIAGNOSIS — I82.402 ACUTE DEEP VEIN THROMBOSIS (DVT) OF LEFT LOWER EXTREMITY, UNSPECIFIED VEIN (H): ICD-10-CM

## 2022-10-20 DIAGNOSIS — Z79.01 LONG TERM CURRENT USE OF ANTICOAGULANT THERAPY: ICD-10-CM

## 2022-10-20 LAB — INR BLD: 2.2 (ref 0.9–1.1)

## 2022-10-20 PROCEDURE — 85610 PROTHROMBIN TIME: CPT

## 2022-10-20 PROCEDURE — 36416 COLLJ CAPILLARY BLOOD SPEC: CPT

## 2022-10-20 NOTE — PROGRESS NOTES
ANTICOAGULATION MANAGEMENT     Nando Wolfe 57 year old male is on warfarin with therapeutic INR result. (Goal INR 2.0-3.0)    Recent labs: (last 7 days)     10/20/22  0825   INR 2.2*       ASSESSMENT       Source(s): Chart Review and Patient/Caregiver Call       Warfarin doses taken: Warfarin taken as instructed    Diet: No new diet changes identified    New illness, injury, or hospitalization: No    Medication/supplement changes: None noted    Signs or symptoms of bleeding or clotting: No    Previous INR: Therapeutic last 2(+) visits    Additional findings: None       PLAN     Recommended plan for no diet, medication or health factor changes affecting INR     Dosing Instructions: Continue your current warfarin dose with next INR in 2 weeks       Summary  As of 10/20/2022    Full warfarin instructions:  2.5 mg every Fri; 5 mg all other days; Starting 10/20/2022   Next INR check:  11/3/2022             Telephone call with Jim who verbalizes understanding and agrees to plan    Lab visit scheduled    Education provided:     None required    Plan made per [facility] anticoagulation protocol    Noemi Schwarz RN  Anticoagulation Clinic  10/20/2022    _______________________________________________________________________     Anticoagulation Episode Summary     Current INR goal:  2.0-3.0   TTR:  69.8 % (3.7 wk)   Target end date:  3/14/2023   Send INR reminders to:  SHAW WAGGONER    Indications    Acute deep vein thrombosis (DVT) of left lower extremity  unspecified vein (H) [I82.402]           Comments:           Anticoagulation Care Providers     Provider Role Specialty Phone number    Santos Duenas MD Referring Internal Medicine [phone]

## 2022-11-03 ENCOUNTER — ANTICOAGULATION THERAPY VISIT (OUTPATIENT)
Dept: ANTICOAGULATION | Facility: CLINIC | Age: 57
End: 2022-11-03

## 2022-11-03 ENCOUNTER — LAB (OUTPATIENT)
Dept: LAB | Facility: CLINIC | Age: 57
End: 2022-11-03
Payer: COMMERCIAL

## 2022-11-03 DIAGNOSIS — Z79.01 LONG TERM CURRENT USE OF ANTICOAGULANT THERAPY: ICD-10-CM

## 2022-11-03 DIAGNOSIS — I82.402 ACUTE DEEP VEIN THROMBOSIS (DVT) OF LEFT LOWER EXTREMITY, UNSPECIFIED VEIN (H): Primary | ICD-10-CM

## 2022-11-03 DIAGNOSIS — I82.402 ACUTE DEEP VEIN THROMBOSIS (DVT) OF LEFT LOWER EXTREMITY, UNSPECIFIED VEIN (H): ICD-10-CM

## 2022-11-03 LAB — INR BLD: 2 (ref 0.9–1.1)

## 2022-11-03 PROCEDURE — 36416 COLLJ CAPILLARY BLOOD SPEC: CPT

## 2022-11-03 PROCEDURE — 85610 PROTHROMBIN TIME: CPT

## 2022-11-03 NOTE — PROGRESS NOTES
ANTICOAGULATION MANAGEMENT     Nando Wolfe 57 year old male is on warfarin with therapeutic INR result. (Goal INR 2.0-3.0)    Recent labs: (last 7 days)     11/03/22  0735   INR 2.0*       ASSESSMENT       Source(s): Chart Review and Patient/Caregiver Call       Warfarin doses taken: Warfarin taken as instructed    Diet: No new diet changes identified    New illness, injury, or hospitalization: No    Medication/supplement changes: None noted    Signs or symptoms of bleeding or clotting: No    Previous INR: Therapeutic last 2(+) visits    Additional findings: patient has 2 servings of broccoli every week, he has one serving last evening. will recheck in one week to address if INR is trending downwards or if diet contributed.         PLAN     Recommended plan for no diet, medication or health factor changes affecting INR     Dosing Instructions: Continue your current warfarin dose with next INR in 1 week       Summary  As of 11/3/2022    Full warfarin instructions:  2.5 mg every Fri; 5 mg all other days; Starting 11/3/2022   Next INR check:  11/10/2022             Telephone call with Jim who verbalizes understanding and agrees to plan    Lab visit scheduled    Education provided:     Contact [phone]  with any changes, questions or concerns.     Plan made per [facility] anticoagulation protocol    Karen Antoine RN  Anticoagulation Clinic  11/3/2022    _______________________________________________________________________     Anticoagulation Episode Summary     Current INR goal:  2.0-3.0   TTR:  80.3 % (1.3 mo)   Target end date:  3/14/2023   Send INR reminders to:  SHAW WAGGONER    Indications    Acute deep vein thrombosis (DVT) of left lower extremity  unspecified vein (H) [I82.402]           Comments:           Anticoagulation Care Providers     Provider Role Specialty Phone number    Santos Duenas MD Referring Internal Medicine [phone]

## 2022-11-08 ENCOUNTER — MYC MEDICAL ADVICE (OUTPATIENT)
Dept: FAMILY MEDICINE | Facility: CLINIC | Age: 57
End: 2022-11-08

## 2022-11-10 ENCOUNTER — ANTICOAGULATION THERAPY VISIT (OUTPATIENT)
Dept: ANTICOAGULATION | Facility: CLINIC | Age: 57
End: 2022-11-10

## 2022-11-10 ENCOUNTER — LAB (OUTPATIENT)
Dept: LAB | Facility: CLINIC | Age: 57
End: 2022-11-10
Payer: COMMERCIAL

## 2022-11-10 DIAGNOSIS — I82.402 ACUTE DEEP VEIN THROMBOSIS (DVT) OF LEFT LOWER EXTREMITY, UNSPECIFIED VEIN (H): Primary | ICD-10-CM

## 2022-11-10 DIAGNOSIS — Z79.01 LONG TERM CURRENT USE OF ANTICOAGULANT THERAPY: ICD-10-CM

## 2022-11-10 DIAGNOSIS — I82.402 ACUTE DEEP VEIN THROMBOSIS (DVT) OF LEFT LOWER EXTREMITY, UNSPECIFIED VEIN (H): ICD-10-CM

## 2022-11-10 LAB — INR BLD: 2.2 (ref 0.9–1.1)

## 2022-11-10 PROCEDURE — 85610 PROTHROMBIN TIME: CPT

## 2022-11-10 PROCEDURE — 36415 COLL VENOUS BLD VENIPUNCTURE: CPT

## 2022-11-10 NOTE — PROGRESS NOTES
ANTICOAGULATION MANAGEMENT     Nando Wolfe 57 year old male is on warfarin with therapeutic INR result. (Goal INR 2.0-3.0)    Recent labs: (last 7 days)     11/10/22  0711   INR 2.2*       ASSESSMENT       Source(s): Chart Review and Patient/Caregiver Call       Warfarin doses taken: Warfarin taken as instructed    Diet: No new diet changes identified    New illness, injury, or hospitalization: No    Medication/supplement changes: None noted    Signs or symptoms of bleeding or clotting: No    Previous INR: Therapeutic last 2(+) visits    Additional findings: None       PLAN     Recommended plan for no diet, medication or health factor changes affecting INR     Dosing Instructions: Continue your current warfarin dose with next INR in 2 weeks       Summary  As of 11/10/2022    Full warfarin instructions:  2.5 mg every Fri; 5 mg all other days; Starting 11/10/2022   Next INR check:  11/24/2022             Telephone call with Jim who verbalizes understanding and agrees to plan    Lab visit scheduled    Education provided:     Contact [phone]  with any changes, questions or concerns.     Plan made per [facility] anticoagulation protocol    Karen Antoine RN  Anticoagulation Clinic  11/10/2022    _______________________________________________________________________     Anticoagulation Episode Summary     Current INR goal:  2.0-3.0   TTR:  83.2 % (1.6 mo)   Target end date:  3/14/2023   Send INR reminders to:  SHAW WAGGONER    Indications    Acute deep vein thrombosis (DVT) of left lower extremity  unspecified vein (H) [I82.402]           Comments:           Anticoagulation Care Providers     Provider Role Specialty Phone number    Santos Duenas MD Referring Internal Medicine [phone]

## 2022-11-17 ENCOUNTER — IMMUNIZATION (OUTPATIENT)
Dept: FAMILY MEDICINE | Facility: CLINIC | Age: 57
End: 2022-11-17
Payer: COMMERCIAL

## 2022-11-17 DIAGNOSIS — Z23 HIGH PRIORITY FOR 2019-NCOV VACCINE: Primary | ICD-10-CM

## 2022-11-17 PROCEDURE — 99207 PR NO CHARGE NURSE ONLY: CPT

## 2022-11-17 PROCEDURE — 91312 COVID-19,PF,PFIZER BOOSTER BIVALENT: CPT

## 2022-11-17 PROCEDURE — 0124A COVID-19,PF,PFIZER BOOSTER BIVALENT: CPT

## 2022-11-23 ENCOUNTER — ANTICOAGULATION THERAPY VISIT (OUTPATIENT)
Dept: ANTICOAGULATION | Facility: CLINIC | Age: 57
End: 2022-11-23

## 2022-11-23 ENCOUNTER — LAB (OUTPATIENT)
Dept: LAB | Facility: CLINIC | Age: 57
End: 2022-11-23
Payer: COMMERCIAL

## 2022-11-23 DIAGNOSIS — I82.402 ACUTE DEEP VEIN THROMBOSIS (DVT) OF LEFT LOWER EXTREMITY, UNSPECIFIED VEIN (H): Primary | ICD-10-CM

## 2022-11-23 DIAGNOSIS — Z79.01 LONG TERM CURRENT USE OF ANTICOAGULANT THERAPY: ICD-10-CM

## 2022-11-23 DIAGNOSIS — I82.402 ACUTE DEEP VEIN THROMBOSIS (DVT) OF LEFT LOWER EXTREMITY, UNSPECIFIED VEIN (H): ICD-10-CM

## 2022-11-23 LAB — INR BLD: 1.7 (ref 0.9–1.1)

## 2022-11-23 PROCEDURE — 36416 COLLJ CAPILLARY BLOOD SPEC: CPT

## 2022-11-23 PROCEDURE — 85610 PROTHROMBIN TIME: CPT

## 2022-11-23 NOTE — PROGRESS NOTES
ANTICOAGULATION MANAGEMENT     Nando Wolfe 57 year old male is on warfarin with subtherapeutic INR result. (Goal INR 2.0-3.0)    Recent labs: (last 7 days)     11/23/22  0735   INR 1.7*       ASSESSMENT       Source(s): Chart Review and Patient/Caregiver Call       Warfarin doses taken: Warfarin taken as instructed    Diet: Decreased greens/vitamin K in diet; plans to resume previous intake    New illness, injury, or hospitalization: No    Medication/supplement changes: None noted    Signs or symptoms of bleeding or clotting: No    Previous INR: Therapeutic last visit; previously outside of goal range    Additional findings: eating healthy and exersising more, having more lean meats       PLAN     Recommended plan for ongoing change(s) affecting INR     Dosing Instructions: booster dose then Increase your warfarin dose (7.7% change) with next INR in 10 days       Summary  As of 11/23/2022    Full warfarin instructions:  11/23: 7.5 mg; Otherwise 5 mg every day; Starting 11/23/2022   Next INR check:  12/2/2022             Telephone call with Jim who verbalizes understanding and agrees to plan    Lab visit scheduled    Education provided:     Taking warfarin: importance of following [facility] instructions vs instructions on the prescription bottle    Goal range and lab monitoring: goal range and significance of current result, Importance of therapeutic range and Importance of following up at instructed interval    Dietary considerations: Impact of protein intake on INR     Plan made per [facility] anticoagulation protocol    Lisa Laurent RN  Anticoagulation Clinic  11/23/2022    _______________________________________________________________________     Anticoagulation Episode Summary     Current INR goal:  2.0-3.0   TTR:  73.9 % (2 mo)   Target end date:  3/14/2023   Send INR reminders to:  SHAW WAGGONER    Indications    Acute deep vein thrombosis (DVT) of left lower extremity  unspecified vein (H) [I82.402]            Comments:           Anticoagulation Care Providers     Provider Role Specialty Phone number    Santos Duenas MD Referring Internal Medicine [phone]

## 2022-12-02 ENCOUNTER — TELEPHONE (OUTPATIENT)
Dept: DERMATOLOGY | Facility: CLINIC | Age: 57
End: 2022-12-02

## 2022-12-02 ENCOUNTER — ANTICOAGULATION THERAPY VISIT (OUTPATIENT)
Dept: ANTICOAGULATION | Facility: CLINIC | Age: 57
End: 2022-12-02

## 2022-12-02 ENCOUNTER — APPOINTMENT (OUTPATIENT)
Dept: LAB | Facility: CLINIC | Age: 57
End: 2022-12-02
Payer: COMMERCIAL

## 2022-12-02 ENCOUNTER — OFFICE VISIT (OUTPATIENT)
Dept: FAMILY MEDICINE | Facility: CLINIC | Age: 57
End: 2022-12-02
Payer: COMMERCIAL

## 2022-12-02 VITALS
HEART RATE: 71 BPM | DIASTOLIC BLOOD PRESSURE: 83 MMHG | SYSTOLIC BLOOD PRESSURE: 129 MMHG | HEIGHT: 68 IN | RESPIRATION RATE: 16 BRPM | BODY MASS INDEX: 31.98 KG/M2 | WEIGHT: 211 LBS | OXYGEN SATURATION: 98 % | TEMPERATURE: 96.9 F

## 2022-12-02 DIAGNOSIS — Z23 NEED FOR DIPHTHERIA-TETANUS-PERTUSSIS (TDAP) VACCINE: ICD-10-CM

## 2022-12-02 DIAGNOSIS — Z79.01 LONG TERM CURRENT USE OF ANTICOAGULANT THERAPY: ICD-10-CM

## 2022-12-02 DIAGNOSIS — Z79.01 CHRONIC ANTICOAGULATION: ICD-10-CM

## 2022-12-02 DIAGNOSIS — Z12.5 SCREENING FOR PROSTATE CANCER: ICD-10-CM

## 2022-12-02 DIAGNOSIS — L91.8 SKIN TAG: ICD-10-CM

## 2022-12-02 DIAGNOSIS — D68.51 FACTOR 5 LEIDEN MUTATION, HETEROZYGOUS (H): ICD-10-CM

## 2022-12-02 DIAGNOSIS — Z00.00 ENCOUNTER FOR ANNUAL PHYSICAL EXAM: Primary | ICD-10-CM

## 2022-12-02 DIAGNOSIS — I82.402 ACUTE DEEP VEIN THROMBOSIS (DVT) OF LEFT LOWER EXTREMITY, UNSPECIFIED VEIN (H): ICD-10-CM

## 2022-12-02 DIAGNOSIS — I82.402 ACUTE DEEP VEIN THROMBOSIS (DVT) OF LEFT LOWER EXTREMITY, UNSPECIFIED VEIN (H): Primary | ICD-10-CM

## 2022-12-02 DIAGNOSIS — Z13.6 CARDIOVASCULAR SCREENING; LDL GOAL LESS THAN 100: ICD-10-CM

## 2022-12-02 LAB
BASOPHILS # BLD AUTO: 0 10E3/UL (ref 0–0.2)
BASOPHILS NFR BLD AUTO: 1 %
CHOLEST SERPL-MCNC: 195 MG/DL
EOSINOPHIL # BLD AUTO: 0.2 10E3/UL (ref 0–0.7)
EOSINOPHIL NFR BLD AUTO: 4 %
ERYTHROCYTE [DISTWIDTH] IN BLOOD BY AUTOMATED COUNT: 13.1 % (ref 10–15)
FASTING STATUS PATIENT QL REPORTED: YES
HCT VFR BLD AUTO: 43.8 % (ref 40–53)
HDLC SERPL-MCNC: 48 MG/DL
HGB BLD-MCNC: 14.9 G/DL (ref 13.3–17.7)
IMM GRANULOCYTES # BLD: 0 10E3/UL
IMM GRANULOCYTES NFR BLD: 0 %
INR BLD: 2 (ref 0.9–1.1)
LDLC SERPL CALC-MCNC: 136 MG/DL
LYMPHOCYTES # BLD AUTO: 1.6 10E3/UL (ref 0.8–5.3)
LYMPHOCYTES NFR BLD AUTO: 35 %
MCH RBC QN AUTO: 29.2 PG (ref 26.5–33)
MCHC RBC AUTO-ENTMCNC: 34 G/DL (ref 31.5–36.5)
MCV RBC AUTO: 86 FL (ref 78–100)
MONOCYTES # BLD AUTO: 0.5 10E3/UL (ref 0–1.3)
MONOCYTES NFR BLD AUTO: 10 %
NEUTROPHILS # BLD AUTO: 2.4 10E3/UL (ref 1.6–8.3)
NEUTROPHILS NFR BLD AUTO: 51 %
NONHDLC SERPL-MCNC: 147 MG/DL
PLATELET # BLD AUTO: 193 10E3/UL (ref 150–450)
PSA SERPL-MCNC: 0.57 UG/L (ref 0–4)
RBC # BLD AUTO: 5.11 10E6/UL (ref 4.4–5.9)
TRIGL SERPL-MCNC: 56 MG/DL
WBC # BLD AUTO: 4.7 10E3/UL (ref 4–11)

## 2022-12-02 PROCEDURE — G0103 PSA SCREENING: HCPCS | Performed by: INTERNAL MEDICINE

## 2022-12-02 PROCEDURE — 99396 PREV VISIT EST AGE 40-64: CPT | Mod: 25 | Performed by: INTERNAL MEDICINE

## 2022-12-02 PROCEDURE — 80053 COMPREHEN METABOLIC PANEL: CPT | Performed by: INTERNAL MEDICINE

## 2022-12-02 PROCEDURE — 90715 TDAP VACCINE 7 YRS/> IM: CPT | Performed by: INTERNAL MEDICINE

## 2022-12-02 PROCEDURE — 85610 PROTHROMBIN TIME: CPT | Performed by: INTERNAL MEDICINE

## 2022-12-02 PROCEDURE — 80061 LIPID PANEL: CPT | Performed by: INTERNAL MEDICINE

## 2022-12-02 PROCEDURE — 36415 COLL VENOUS BLD VENIPUNCTURE: CPT | Performed by: INTERNAL MEDICINE

## 2022-12-02 PROCEDURE — 90471 IMMUNIZATION ADMIN: CPT | Performed by: INTERNAL MEDICINE

## 2022-12-02 PROCEDURE — 85025 COMPLETE CBC W/AUTO DIFF WBC: CPT | Performed by: INTERNAL MEDICINE

## 2022-12-02 RX ORDER — WARFARIN SODIUM 5 MG/1
5 TABLET ORAL DAILY
Qty: 90 TABLET | Refills: 3 | Status: SHIPPED | OUTPATIENT
Start: 2022-12-02 | End: 2022-12-30

## 2022-12-02 ASSESSMENT — ENCOUNTER SYMPTOMS
HEARTBURN: 0
ABDOMINAL PAIN: 0
CONSTIPATION: 0
SORE THROAT: 0
DIZZINESS: 1
PALPITATIONS: 0
NAUSEA: 0
PARESTHESIAS: 0
ARTHRALGIAS: 0
HEADACHES: 0
DIARRHEA: 0
JOINT SWELLING: 0
CHILLS: 0
FEVER: 0
SHORTNESS OF BREATH: 0
HEMATURIA: 0
FREQUENCY: 0
NERVOUS/ANXIOUS: 0
DYSURIA: 0
COUGH: 0
MYALGIAS: 0
WEAKNESS: 0
HEMATOCHEZIA: 0
EYE PAIN: 0

## 2022-12-02 ASSESSMENT — PAIN SCALES - GENERAL: PAINLEVEL: NO PAIN (0)

## 2022-12-02 NOTE — PROGRESS NOTES
ANTICOAGULATION MANAGEMENT     Nando Wolfe 57 year old male is on warfarin with therapeutic INR result. (Goal INR 2.0-3.0)    Recent labs: (last 7 days)     12/02/22  0819   INR 2.0*       ASSESSMENT       Source(s): Chart Review and Patient/Caregiver Call       Warfarin doses taken: Warfarin taken as instructed    Diet: No new diet changes identified    New illness, injury, or hospitalization: No    Medication/supplement changes: None noted    Signs or symptoms of bleeding or clotting: No    Previous INR: Subtherapeutic    Additional findings: None       PLAN     Recommended plan for no diet, medication or health factor changes affecting INR     Dosing Instructions: Continue your current warfarin dose with next INR in 2 weeks       Summary  As of 12/2/2022    Full warfarin instructions:  5 mg every day; Starting 12/2/2022   Next INR check:  12/16/2022             Telephone call with Jim who agrees to plan and repeated back plan correctly    Lab visit scheduled    Education provided:     Taking warfarin: Importance of taking warfarin as instructed    Goal range and lab monitoring: goal range and significance of current result and Importance of therapeutic range    Plan made per [facility] anticoagulation protocol    Tea King RN  Anticoagulation Clinic  12/2/2022    _______________________________________________________________________     Anticoagulation Episode Summary     Current INR goal:  2.0-3.0   TTR:  64.3 % (2.3 mo)   Target end date:  3/14/2023   Send INR reminders to:  SHAW WAGGONER    Indications    Acute deep vein thrombosis (DVT) of left lower extremity  unspecified vein (H) [I82.402]           Comments:           Anticoagulation Care Providers     Provider Role Specialty Phone number    VocalSantos MD Referring Internal Medicine [phone]

## 2022-12-02 NOTE — PROGRESS NOTES
SUBJECTIVE:   CC: Jim is an 57 year old who presents for preventative health visit.     Patient has been advised of split billing requirements and indicates understanding: Yes  Healthy Habits:     Getting at least 3 servings of Calcium per day:  Yes    Bi-annual eye exam:  Yes    Dental care twice a year:  Yes    Sleep apnea or symptoms of sleep apnea:  None    Diet:  Carbohydrate counting    Frequency of exercise:  2-3 days/week    Duration of exercise:  15-30 minutes    Taking medications regularly:  Yes    Medication side effects:  Lightheadedness    PHQ-2 Total Score: 0    Additional concerns today:  Yes      Today's PHQ-2 Score:   PHQ-2 (  [employer]) 2022   Q1: Little interest or pleasure in doing things 0   Q2: Feeling down, depressed or hopeless 0   PHQ-2 Score 0   Q1: Little interest or pleasure in doing things Not at all   Q2: Feeling down, depressed or hopeless Not at all   PHQ-2 Score 0       Have you ever done Advance Care Planning? (For example, a Health Directive, POLST, or a discussion with a medical provider or your loved ones about your wishes): No, advance care planning information given to patient to review.  Patient plans to discuss their wishes with loved ones or provider.      Social History     Tobacco Use     Smoking status: Former     Packs/day: 0.50     Types: Cigarettes     Quit date: 2013     Years since quittin.5     Smokeless tobacco: Never   Substance Use Topics     Alcohol use: Yes     Comment: 4-5 drinks per wk     Alcohol Use 2022   Prescreen: >3 drinks/day or >7 drinks/week? No   Prescreen: >3 drinks/day or >7 drinks/week? -     Last PSA:   PSA   Date Value Ref Range Status   2017 0.52 0 - 4 ug/L Final     Comment:     Assay Method:  Chemiluminescence using Siemens Vista analyzer       Reviewed orders with patient. Reviewed health maintenance and updated orders accordingly - Yes  Labs reviewed in EPIC  BP Readings from Last 3 Encounters:   22 129/83    22 138/79   09/10/22 (!) 149/81    Wt Readings from Last 3 Encounters:   22 95.7 kg (211 lb)   22 100.1 kg (220 lb 9.6 oz)   09/10/22 99.8 kg (220 lb)                  Patient Active Problem List   Diagnosis     CARDIOVASCULAR SCREENING; LDL GOAL LESS THAN 160     Obese     Back pain with radiation     Elevated blood sugar     DDD (degenerative disc disease), lumbar     Discogenic syndrome, lumbar     Acute deep vein thrombosis (DVT) of left lower extremity, unspecified vein (H)     Factor 5 Leiden mutation, heterozygous (H)     Past Surgical History:   Procedure Laterality Date     COLONOSCOPY WITH CO2 INSUFFLATION N/A 2018    Procedure: COLONOSCOPY WITH CO2 INSUFFLATION;  Shania Sosa/Colonoscopy/Screening/[facility] Fax number: [phone];  Surgeon: uHssein Jessica MD;  Location: [facility] OR     HERNIA REPAIR       TONSILLECTOMY         Social History     Tobacco Use     Smoking status: Former     Packs/day: 0.50     Types: Cigarettes     Quit date: 2013     Years since quittin.6     Smokeless tobacco: Never   Substance Use Topics     Alcohol use: Yes     Comment: 4-5 drinks per wk     Family History   Problem Relation Age of Onset     Cerebrovascular Disease Maternal Grandfather      Heart Disease Paternal Grandfather      Cerebrovascular Disease Maternal Uncle      Breast Cancer Paternal Aunt      Colon Cancer Paternal Uncle      Diabetes No family hx of      Coronary Artery Disease No family hx of      Hypertension No family hx of      Hyperlipidemia No family hx of      Prostate Cancer No family hx of      Depression No family hx of      Anxiety Disorder No family hx of      Asthma No family hx of      Thyroid Disease No family hx of      Genetic Disorder No family hx of          Current Outpatient Medications   Medication Sig Dispense Refill     apixaban ANTICOAGULANT (ELIQUIS) 5 MG tablet Take 1 tablet (5 mg) by mouth 2 times daily 60 tablet 0     warfarin  "ANTICOAGULANT (COUMADIN) 5 MG tablet Take 1 tablet (5 mg) by mouth daily 90 tablet 3     No Known Allergies  Recent Labs   Lab Test 12/02/22  0819 06/23/17  0712   * 110*   HDL 48 39*   TRIG 56 70   ALT  --  32   CR  --  0.89   GFRESTIMATED  --  90   GFRESTBLACK  --  >90   GFR Calc     POTASSIUM  --  4.1   TSH  --  1.24        Reviewed and updated as needed this visit by clinical staff   Tobacco  Allergies  Meds              Reviewed and updated as needed this visit by Provider                     Review of Systems   Constitutional: Negative for chills and fever.   HENT: Negative for congestion, ear pain, hearing loss and sore throat.    Eyes: Negative for pain and visual disturbance.   Respiratory: Negative for cough and shortness of breath.    Cardiovascular: Positive for peripheral edema. Negative for chest pain and palpitations.   Gastrointestinal: Negative for abdominal pain, constipation, diarrhea, heartburn, hematochezia and nausea.   Genitourinary: Negative for dysuria, frequency, genital sores, hematuria, impotence, penile discharge and urgency.   Musculoskeletal: Negative for arthralgias, joint swelling and myalgias.   Skin: Negative for rash.   Neurological: Positive for dizziness. Negative for weakness, headaches and paresthesias.   Psychiatric/Behavioral: Negative for mood changes. The patient is not nervous/anxious.        OBJECTIVE:   /83 (BP Location: Left arm, Patient Position: Chair, Cuff Size: Adult Regular)   Pulse 71   Temp 96.9  F (36.1  C) (Tympanic)   Resp 16   Ht 1.734 m (5' 8.25\")   Wt 95.7 kg (211 lb)   SpO2 98%   BMI 31.85 kg/m    Physical Exam  GENERAL: healthy, alert and no distress  EYES: Eyes grossly normal to inspection and conjunctivae and sclerae normal  HENT: normal cephalic/atraumatic and oral mucous membranes moist  NECK: no adenopathy and thyroid normal to palpation  RESP: lungs clear to auscultation - no rales, rhonchi or wheezes  CV: " regular rate and rhythm, normal S1 S2, no S3 or S4, no murmur, click or rub, no peripheral edema and peripheral pulses strong  ABDOMEN: soft, nontender, no hepatosplenomegaly, no masses and bowel sounds normal  MS: no gross musculoskeletal defects noted, no edema  SKIN: A solitary, large, pedunculated skin tag noted in the right lower abdominal area that is non-tender.  NEURO: Normal strength and tone, mentation intact and speech normal  PSYCH: mentation appears normal, affect normal/bright    Diagnostic Test Results:  Factor V Mutation Interpretation This individual does not have the Factor V Leiden ([id number]) mutation.  Abnormal   This individual has the Factor V ([id number]) mutation on one allele, and is a heterozygous carrier. This mutation is a risk factor for venous thrombosis, miscarriage, and possible arterial thrombosis.   Comment: JAZLYN Jay MD   FACTOR V LEIDEN Normal by PCR. Heterozygous by PCR. Abnormal     Resulting Agency  [facility]   Specimen Collected: 09/10/22 12:47 PM Last Resulted: 09/15/22  3:33 PM   Received From: [facility]  Result Received: 09/19/22  7:29 AM         ASSESSMENT/PLAN:     Encounter for annual physical exam  - REVIEW OF HEALTH MAINTENANCE PROTOCOL ORDERS  - CBC with platelets and differential  - Comprehensive metabolic panel    Need for diphtheria-tetanus-pertussis (Tdap) vaccine  - TDAP VACCINE (Adacel, Boostrix)    Chronic anticoagulation  - INR point of care  - CBC with platelets and differential  - apixaban ANTICOAGULANT (ELIQUIS) 5 MG tablet; Take 1 tablet (5 mg) by mouth 2 times daily    CARDIOVASCULAR SCREENING; LDL GOAL LESS THAN 100  - Lipid panel reflex to direct LDL Fasting    Screening for prostate cancer  - PSA, screen    Skin tag  - Adult Dermatology Referral    Acute deep vein thrombosis (DVT) of left lower extremity, unspecified vein (H)  - warfarin ANTICOAGULANT (COUMADIN) 5 MG tablet; Take 1 tablet (5 mg) by mouth daily  -  "apixaban ANTICOAGULANT (ELIQUIS) 5 MG tablet; Take 1 tablet (5 mg) by mouth 2 times daily  - INR point of care    Factor 5 Leiden mutation, heterozygous (H)  Rx for Eliquis given for future use only IF patient is travelling overseas so patient does not have to check INR while overseas.  - apixaban ANTICOAGULANT (ELIQUIS) 5 MG tablet; Take 1 tablet (5 mg) by mouth 2 times daily    Long term current use of anticoagulant therapy  - INR point of care      Patient has been advised of split billing requirements and indicates understanding: Yes      COUNSELING:   Special attention given to:        Regular exercise       Healthy diet/nutrition       Immunizations    Vaccinated for: TDAP             Prostate cancer screening       The 10-year ASCVD risk score (Kecia RAMIREZ, et al., 2019) is: 7.4%    Values used to calculate the score:      Age: 57 years      Sex: Male      Is Non- : No      Diabetic: No      Tobacco smoker: No      Systolic Blood Pressure: 129 mmHg      Is BP treated: No      HDL Cholesterol: 48 mg/dL      Total Cholesterol: 195 mg/dL      BMI:   Estimated body mass index is 33.54 kg/m  as calculated from the following:    Height as of 9/14/22: 1.727 m (5' 8\").    Weight as of 9/14/22: 100.1 kg (220 lb 9.6 oz).   Weight management plan: diet and exercise.      He reports that he quit smoking about 9 years ago. His smoking use included cigarettes. He smoked an average of .5 packs per day. He has never used smokeless tobacco.      Santos Duenas MD  [facility]  "

## 2022-12-02 NOTE — NURSING NOTE
Prior to immunization administration, verified patients identity using patient s name and date of birth. Please see Immunization Activity for additional information.     Screening Questionnaire for Adult Immunization    Are you sick today?   No   Do you have allergies to medications, food, a vaccine component or latex?   No   Have you ever had a serious reaction after receiving a vaccination?   No   Do you have a long-term health problem with heart, lung, kidney, or metabolic disease (e.g., diabetes), asthma, a blood disorder, no spleen, complement component deficiency, a cochlear implant, or a spinal fluid leak?  Are you on long-term aspirin therapy?   Yes   Do you have cancer, leukemia, HIV/AIDS, or any other immune system problem?   No   Do you have a parent, brother, or sister with an immune system problem?   No   In the past 3 months, have you taken medications that affect  your immune system, such as prednisone, other steroids, or anticancer drugs; drugs for the treatment of rheumatoid arthritis, Crohn s disease, or psoriasis; or have you had radiation treatments?   No   Have you had a seizure, or a brain or other nervous system problem?   No   During the past year, have you received a transfusion of blood or blood    products, or been given immune (gamma) globulin or antiviral drug?   No   For women: Are you pregnant or is there a chance you could become       pregnant during the next month?   No   Have you received any vaccinations in the past 4 weeks?   Yes     Immunization questionnaire was positive for at least one answer.  Notified Dr Duenas .        Per orders of Dr. Duenas, injection of Tdap given by Madeline Rowe MA. Patient instructed to remain in clinic for 15 minutes afterwards, and to report any adverse reaction to me immediately.       Screening performed by Madeline Rowe MA on 12/2/2022 at 8:12 AM.

## 2022-12-02 NOTE — TELEPHONE ENCOUNTER
[facility] Call Center    Phone Message    May a detailed message be left on voicemail: yes     Reason for Call: Other: Pt has Appt on 12/07/22 in [place] and is wondering if you can remove Skin Tags if Pt is on Blood Thinners - please call him back to discuss - Thank you     Action Taken: Message routed to:  Other: [facility]    Travel Screening: Not Applicable

## 2022-12-04 ENCOUNTER — MYC MEDICAL ADVICE (OUTPATIENT)
Dept: FAMILY MEDICINE | Facility: CLINIC | Age: 57
End: 2022-12-04

## 2022-12-05 LAB
ALBUMIN SERPL-MCNC: 4.3 G/DL (ref 3.4–5)
ALP SERPL-CCNC: 70 U/L (ref 40–150)
ALT SERPL W P-5'-P-CCNC: 32 U/L (ref 0–70)
ANION GAP SERPL CALCULATED.3IONS-SCNC: 6 MMOL/L (ref 3–14)
AST SERPL W P-5'-P-CCNC: 23 U/L (ref 0–45)
BILIRUB SERPL-MCNC: 0.4 MG/DL (ref 0.2–1.3)
BUN SERPL-MCNC: 19 MG/DL (ref 7–30)
CALCIUM SERPL-MCNC: 9.1 MG/DL (ref 8.5–10.1)
CHLORIDE BLD-SCNC: 108 MMOL/L (ref 94–109)
CO2 SERPL-SCNC: 26 MMOL/L (ref 20–32)
CREAT SERPL-MCNC: 0.87 MG/DL (ref 0.66–1.25)
GFR SERPL CREATININE-BSD FRML MDRD: >90 ML/MIN/1.73M2
GLUCOSE BLD-MCNC: 92 MG/DL (ref 70–99)
POTASSIUM BLD-SCNC: 4.7 MMOL/L (ref 3.4–5.3)
PROT SERPL-MCNC: 7.8 G/DL (ref 6.8–8.8)
SODIUM SERPL-SCNC: 140 MMOL/L (ref 133–144)

## 2022-12-05 NOTE — TELEPHONE ENCOUNTER
Called pt to let him know blood thinners will not affect his appointment for skin tag removal.    No answer, left generic voicemail for pt to return call to clinic.     Erna Turner RN

## 2022-12-06 NOTE — TELEPHONE ENCOUNTER
I spoke with Jim and notified him that he does not need to hold his Coumadin in order to have the skin tag removed.  He verbalized understanding.  Valeria Stanley RN

## 2022-12-07 ENCOUNTER — OFFICE VISIT (OUTPATIENT)
Dept: DERMATOLOGY | Facility: CLINIC | Age: 57
End: 2022-12-07
Attending: INTERNAL MEDICINE
Payer: COMMERCIAL

## 2022-12-07 ENCOUNTER — TELEPHONE (OUTPATIENT)
Dept: DERMATOLOGY | Facility: CLINIC | Age: 57
End: 2022-12-07

## 2022-12-07 DIAGNOSIS — L81.4 SOLAR LENTIGO: ICD-10-CM

## 2022-12-07 DIAGNOSIS — L91.8 SKIN TAG: ICD-10-CM

## 2022-12-07 DIAGNOSIS — D18.01 CHERRY ANGIOMA: ICD-10-CM

## 2022-12-07 DIAGNOSIS — D22.9 MULTIPLE BENIGN NEVI: Primary | ICD-10-CM

## 2022-12-07 DIAGNOSIS — L82.1 SEBORRHEIC KERATOSES: ICD-10-CM

## 2022-12-07 PROCEDURE — 99203 OFFICE O/P NEW LOW 30 MIN: CPT | Performed by: PHYSICIAN ASSISTANT

## 2022-12-07 ASSESSMENT — PAIN SCALES - GENERAL: PAINLEVEL: NO PAIN (0)

## 2022-12-07 NOTE — NURSING NOTE
Nando Wolfe's chief complaint for this visit includes:  Chief Complaint   Patient presents with     Skin Check     Full body skin check. No Hx of skin cancer.      PCP: Santos Duenas    Referring Provider:  Santos Duenas MD  [id number] [place] [address]    There were no vitals taken for this visit.  No Pain (0)      No Known Allergies      Do you need any medication refills at today's visit? No    Lucinda Dao CMA

## 2022-12-07 NOTE — TELEPHONE ENCOUNTER
Jim called back and said his wife was worrying about the skin tag removal.  He states his doctor told him he would need to hold anticoagulants two days prior to removal.      Informed pt he can discuss with Kell at his appointment further and the skin tag does not need to be removed today unless he is comfortable with the plan.  Pt verbalized understanding.    Erna Turner RN

## 2022-12-07 NOTE — PROGRESS NOTES
[facility] Dermatology Note  Encounter Date: Dec 7, 2022  Office Visit     Dermatology Problem List:  Last skin check 12/7/22  1. Plan for shave removal of the following at a future date - 2 benign appearing lesions on the nose and R larger pedunculated lesion on the R hip  ____________________________________________    Assessment & Plan:    # Cherry angioma(s).    - No further intervention needed.    # Multiple clinically benign nevi.    - No further intervention needed.   - Signs and Symptoms of non-melanoma skin cancer and ABCDEs of melanoma reviewed with patient. Patient encouraged to perform monthly self skin exams and educated on how to perform them. UV precautions reviewed with patient. Patient was asked about new or changing moles/lesions on body.   - Sunscreen: Apply 20 minutes prior to going outdoors and reapply every two hours, when wet or sweating. We recommend using an SPF 30 or higher, and to use one that is water resistant.       # Seborrheic keratosis, non irritated.   - No further intervention needed.     # Solar lentigines.   - No further intervention needed.     # Right hip: 3 cm pedunculated lesion c/w skin tag. Reviewed options for treating via shave biopsy. Patient defers at this time, but will return in the future for removal.    - Return to clinic if interested in removal.   - Photodocumentation today    # Right nasal dorsum: two benign appearing flesh colored papules   - Plan for shave biopsy in the future.  -  Photodocumentation today       Procedures Performed:   None.    Follow-up: 1-2 year(s) in-person, or earlier for new or changing lesions    Staff and Scribe:     Scribe Disclosure:   I, Bertrand Stanton, am serving as a scribe to document services personally performed by Kell Rodriguez PA-C, based on data collection and the provider's statements to me.  Provider Disclosure:   The documentation recorded by the scribe accurately reflects the services I personally  "performed and the decisions made by me.    All risks, benefits and alternatives were discussed with patient.  Patient is in agreement and understands the assessment and plan.  All questions were answered.    Kell Rodriguez PA-C, MPAS  [facility]: Phone: [phone], Fax: [phone]  [facility]: Phone: [phone],  Fax: [phone]  [facility]: Phone: [phone], Fax: [phone]  ____________________________________________    CC: Skin Check (Full body skin check. No Hx of skin cancer. )    HPI:  Mr. Nando Wolfe is a(n) 57 year old male who presents today as a new patient for a skin check.    Referred to derm by Dr. Duenas for a skin tag. Note from 12/2/22 reviewed. Exam notes a \"solitary, large, pedunculated skin tag noted in the right lower abdominal area that is non-tender.\"    Today, he has no areas of concern. He is not interested in having this removed today, but he is interested in having these removed in the future.    Patient is otherwise feeling well, without additional concerns.    Labs:  NA    Physical Exam:  Vitals: There were no vitals taken for this visit.  SKIN: Total skin excluding the undergarment areas was performed. The exam included the head/face, neck, both arms, chest, back, abdomen, both legs, digits and/or nails.   - Pederson Type II  - Right hip: 3 cm, soft, pedunculated lesion  - Right nasal dorsum: two benign appearing flesh colored papules  - There are dome shaped bright red papules on the trunk and extremities.  - Multiple regular brown pigmented macules and papules are identified on the trunk and extremities.   - Scattered brown macules on sun exposed areas.  - There are waxy stuck on tan to brown papules on the trunk and extremities.   - No other lesions of concern on areas examined.               Medications:  Current Outpatient Medications   Medication     " warfarin ANTICOAGULANT (COUMADIN) 5 MG tablet     apixaban ANTICOAGULANT (ELIQUIS) 5 MG tablet     No current facility-administered medications for this visit.      Past Medical History:   Patient Active Problem List   Diagnosis     CARDIOVASCULAR SCREENING; LDL GOAL LESS THAN 160     Obese     Back pain with radiation     Elevated blood sugar     DDD (degenerative disc disease), lumbar     Discogenic syndrome, lumbar     Acute deep vein thrombosis (DVT) of left lower extremity, unspecified vein (H)     Factor 5 Leiden mutation, heterozygous (H)     Past Medical History:   Diagnosis Date     DDD (degenerative disc disease), lumbar         CC Santos Duenas MD  [id number] [place] [address] on close of this encounter.

## 2022-12-07 NOTE — LETTER
12/7/2022         RE: Nando Wolfe  [address]  [place] [address]        Dear Colleague,    Thank you for referring your patient, Nando Wolfe, to the [facility]. Please see a copy of my visit note below.    [facility] Dermatology Note  Encounter Date: Dec 7, 2022  Office Visit     Dermatology Problem List:  Last skin check 12/7/22  1. Plan for shave removal of the following at a future date - 2 benign appearing lesions on the nose and R larger pedunculated lesion on the R hip  ____________________________________________    Assessment & Plan:    # Cherry angioma(s).    - No further intervention needed.    # Multiple clinically benign nevi.    - No further intervention needed.   - Signs and Symptoms of non-melanoma skin cancer and ABCDEs of melanoma reviewed with patient. Patient encouraged to perform monthly self skin exams and educated on how to perform them. UV precautions reviewed with patient. Patient was asked about new or changing moles/lesions on body.   - Sunscreen: Apply 20 minutes prior to going outdoors and reapply every two hours, when wet or sweating. We recommend using an SPF 30 or higher, and to use one that is water resistant.       # Seborrheic keratosis, non irritated.   - No further intervention needed.     # Solar lentigines.   - No further intervention needed.     # Right hip: 3 cm pedunculated lesion c/w skin tag. Reviewed options for treating via shave biopsy. Patient defers at this time, but will return in the future for removal.    - Return to clinic if interested in removal.   - Photodocumentation today    # Right nasal dorsum: two benign appearing flesh colored papules   - Plan for shave biopsy in the future.  -  Photodocumentation today       Procedures Performed:   None.    Follow-up: 1-2 year(s) in-person, or earlier for new or changing lesions    Staff and Scribe:     Scribe Disclosure:   Bertrand RENDON, am  "serving as a scribe to document services personally performed by Kell Rodriguez PA-C, based on data collection and the provider's statements to me.  Provider Disclosure:   The documentation recorded by the scribe accurately reflects the services I personally performed and the decisions made by me.    All risks, benefits and alternatives were discussed with patient.  Patient is in agreement and understands the assessment and plan.  All questions were answered.    Kell Rodriguez PA-C, [facility]  [facility]: Phone: [phone], Fax: [phone]  [facility]: Phone: [phone],  Fax: [phone]  [facility]: Phone: [phone], Fax: [phone]  ____________________________________________    CC: Skin Check (Full body skin check. No Hx of skin cancer. )    HPI:  Mr. Nando Wolfe is a(n) 57 year old male who presents today as a new patient for a skin check.    Referred to derm by Dr. Duenas for a skin tag. Note from 12/2/22 reviewed. Exam notes a \"solitary, large, pedunculated skin tag noted in the right lower abdominal area that is non-tender.\"    Today, he has no areas of concern. He is not interested in having this removed today, but he is interested in having these removed in the future.    Patient is otherwise feeling well, without additional concerns.    Labs:  NA    Physical Exam:  Vitals: There were no vitals taken for this visit.  SKIN: Total skin excluding the undergarment areas was performed. The exam included the head/face, neck, both arms, chest, back, abdomen, both legs, digits and/or nails.   - Pederson Type II  - Right hip: 3 cm, soft, pedunculated lesion  - Right nasal dorsum: two benign appearing flesh colored papules  - There are dome shaped bright red papules on the trunk and extremities.  - Multiple regular brown pigmented macules and papules are identified on the trunk and " extremities.   - Scattered brown macules on sun exposed areas.  - There are waxy stuck on tan to brown papules on the trunk and extremities.   - No other lesions of concern on areas examined.               Medications:  Current Outpatient Medications   Medication     warfarin ANTICOAGULANT (COUMADIN) 5 MG tablet     apixaban ANTICOAGULANT (ELIQUIS) 5 MG tablet     No current facility-administered medications for this visit.      Past Medical History:   Patient Active Problem List   Diagnosis     CARDIOVASCULAR SCREENING; LDL GOAL LESS THAN 160     Obese     Back pain with radiation     Elevated blood sugar     DDD (degenerative disc disease), lumbar     Discogenic syndrome, lumbar     Acute deep vein thrombosis (DVT) of left lower extremity, unspecified vein (H)     Factor 5 Leiden mutation, heterozygous (H)     Past Medical History:   Diagnosis Date     DDD (degenerative disc disease), lumbar         CC Santos Duenas MD  [id number] [place] [address] on close of this encounter.        Again, thank you for allowing me to participate in the care of your patient.        Sincerely,        Kell Rodriguez PA-C

## 2022-12-12 ENCOUNTER — ANCILLARY PROCEDURE (OUTPATIENT)
Dept: ULTRASOUND IMAGING | Facility: CLINIC | Age: 57
End: 2022-12-12
Attending: INTERNAL MEDICINE
Payer: COMMERCIAL

## 2022-12-12 DIAGNOSIS — I82.402 ACUTE DEEP VEIN THROMBOSIS (DVT) OF LEFT LOWER EXTREMITY, UNSPECIFIED VEIN (H): ICD-10-CM

## 2022-12-12 PROCEDURE — 93971 EXTREMITY STUDY: CPT | Mod: LT | Performed by: RADIOLOGY

## 2022-12-15 ENCOUNTER — ANTICOAGULATION THERAPY VISIT (OUTPATIENT)
Dept: ANTICOAGULATION | Facility: CLINIC | Age: 57
End: 2022-12-15

## 2022-12-15 ENCOUNTER — LAB (OUTPATIENT)
Dept: LAB | Facility: CLINIC | Age: 57
End: 2022-12-15
Payer: COMMERCIAL

## 2022-12-15 DIAGNOSIS — I82.402 ACUTE DEEP VEIN THROMBOSIS (DVT) OF LEFT LOWER EXTREMITY, UNSPECIFIED VEIN (H): Primary | ICD-10-CM

## 2022-12-15 DIAGNOSIS — I82.402 ACUTE DEEP VEIN THROMBOSIS (DVT) OF LEFT LOWER EXTREMITY, UNSPECIFIED VEIN (H): ICD-10-CM

## 2022-12-15 DIAGNOSIS — Z79.01 LONG TERM CURRENT USE OF ANTICOAGULANT THERAPY: ICD-10-CM

## 2022-12-15 LAB — INR BLD: 2.2 (ref 0.9–1.1)

## 2022-12-15 PROCEDURE — 36416 COLLJ CAPILLARY BLOOD SPEC: CPT

## 2022-12-15 PROCEDURE — 85610 PROTHROMBIN TIME: CPT

## 2022-12-15 NOTE — PROGRESS NOTES
ANTICOAGULATION MANAGEMENT     Nando Wolfe 57 year old male is on warfarin with therapeutic INR result. (Goal INR 2.0-3.0)    Recent labs: (last 7 days)     12/15/22  0747   INR 2.2*       ASSESSMENT       Source(s): Chart Review and Patient/Caregiver Call       Warfarin doses taken: Warfarin taken as instructed    Diet: No new diet changes identified    New illness, injury, or hospitalization: No-pt had US done Monday, waiting for a call on results    Medication/supplement changes: None noted    Signs or symptoms of bleeding or clotting: No    Previous INR: Therapeutic last visit; previously outside of goal range    Additional findings: None       PLAN     Recommended plan for no diet, medication or health factor changes affecting INR     Dosing Instructions: Continue your current warfarin dose with next INR in 3 weeks       Summary  As of 12/15/2022    Full warfarin instructions:  5 mg every day; Starting 12/15/2022   Next INR check:  1/5/2023             Telephone call with Jim who verbalizes understanding and agrees to plan    Lab visit scheduled    Education provided:     Contact [phone]  with any changes, questions or concerns.     Plan made per [facility] anticoagulation protocol    Erica Houser RN  Anticoagulation Clinic  12/15/2022    _______________________________________________________________________     Anticoagulation Episode Summary     Current INR goal:  2.0-3.0   TTR:  69.9 % (2.7 mo)   Target end date:  3/14/2023   Send INR reminders to:  SHAW WAGGONER    Indications    Acute deep vein thrombosis (DVT) of left lower extremity  unspecified vein (H) [I82.402]           Comments:           Anticoagulation Care Providers     Provider Role Specialty Phone number    VocalSantos MD Referring Internal Medicine [phone]

## 2022-12-16 ENCOUNTER — MYC MEDICAL ADVICE (OUTPATIENT)
Dept: FAMILY MEDICINE | Facility: CLINIC | Age: 57
End: 2022-12-16

## 2022-12-16 DIAGNOSIS — D68.51 FACTOR 5 LEIDEN MUTATION, HETEROZYGOUS (H): ICD-10-CM

## 2022-12-16 DIAGNOSIS — I82.502 CHRONIC DEEP VEIN THROMBOSIS (DVT) OF LEFT LOWER EXTREMITY, UNSPECIFIED VEIN (H): Primary | ICD-10-CM

## 2022-12-16 DIAGNOSIS — Z79.01 CHRONIC ANTICOAGULATION: ICD-10-CM

## 2022-12-16 DIAGNOSIS — I82.402 ACUTE DEEP VEIN THROMBOSIS (DVT) OF LEFT LOWER EXTREMITY, UNSPECIFIED VEIN (H): ICD-10-CM

## 2022-12-16 NOTE — TELEPHONE ENCOUNTER
See MyChart encounter below. Routing to provider to review and advise.      See encounter 9/10/22 in care everywhere, ED visit, to review initial ultrasound dx DVT. Patient asking if provider can compare this US vs most recent.        KELILE CashN, RN  [facility] Primary [facility]

## 2022-12-20 NOTE — TELEPHONE ENCOUNTER
Patient following up with previous message.    Routing to provider to review and advise.    Janae HOPKINSN, RN

## 2022-12-22 NOTE — TELEPHONE ENCOUNTER
Patient requesting update on US results from 12/12/2022. RN notes no interpretation available at this time. RN informed patient as soon as an interpretation is available he will be contacted with advisement on next steps. Patient verbalized understanding and agreed to plan.    Routing to provider to review and advise.    Jenn Dickson RN    [facility]

## 2022-12-30 NOTE — TELEPHONE ENCOUNTER
Patient calling to check on the status of the results.  Nataly Pederson MA  [facility]  2nd Floor  Primary Care

## 2023-01-02 ENCOUNTER — TELEPHONE (OUTPATIENT)
Dept: HEMATOLOGY | Facility: CLINIC | Age: 58
End: 2023-01-02

## 2023-01-02 NOTE — TELEPHONE ENCOUNTER
[id number]  Nando Wolfe  57 year old male  [facility] Diagnosis: hx of DVT in 9/2022, being scheduled as new patient  [facility] Provider: Catarino White MD - new patient visit on 1/25/2023    Nando Wolfe is calling to schedule a visit.  Reviewed the services of the [facility] at [facility]. Jim had a clot in September without an obvious provoking event.  He had a follow up US with showed some improvement, but unable to compare image to image.    Reviewed basics of thrombosis, treatment, risk factors, etc.  He is scheduled to be seen on 1/25/2023 and knows he can call if he has interim questions.    Zeinab GALINDO, RN   Nurse Clinician  [facility]  [facility]  Office: [phone]  Main Office: [phone] (ask to speak with a nurse)  Fax: [phone]

## 2023-01-04 ENCOUNTER — TELEPHONE (OUTPATIENT)
Dept: FAMILY MEDICINE | Facility: CLINIC | Age: 58
End: 2023-01-04

## 2023-01-04 DIAGNOSIS — I82.402 ACUTE DEEP VEIN THROMBOSIS (DVT) OF LEFT LOWER EXTREMITY, UNSPECIFIED VEIN (H): Primary | ICD-10-CM

## 2023-01-04 NOTE — TELEPHONE ENCOUNTER
Reason for Call:  INR    Who is calling?  PATIENT    Phone number:  [phone]    Fax number:  N/A    Name of caller: [facility] APPIAH    INR Value:  UNKNOWN    Are there any other concerns:  Yes: PLEASE CALL PATIENT    Can we leave a detailed message on this number? YES    Phone number patient can be reached at: Cell number on file:    Telephone Information:   Mobile [phone]         Call taken on 1/4/2023 at 11:06 AM by Rima Cary

## 2023-01-04 NOTE — TELEPHONE ENCOUNTER
ANTICOAGULATION  MANAGEMENT    Nando Wolfe is being discharged from the [facility] ([facility]).    Reason for discharge: warfarin replaced by alternate therapy, Eliquis    Anticoagulation episode resolved, [facility] referral closed and Standing order discontinued    If patient needs warfarin management in the future, please send a new referral    Orin Glaser RN

## 2023-01-25 ENCOUNTER — OFFICE VISIT (OUTPATIENT)
Dept: HEMATOLOGY | Facility: CLINIC | Age: 58
End: 2023-01-25
Attending: INTERNAL MEDICINE
Payer: COMMERCIAL

## 2023-01-25 VITALS
HEART RATE: 73 BPM | BODY MASS INDEX: 32.81 KG/M2 | TEMPERATURE: 97.9 F | WEIGHT: 217.4 LBS | SYSTOLIC BLOOD PRESSURE: 136 MMHG | DIASTOLIC BLOOD PRESSURE: 83 MMHG | OXYGEN SATURATION: 98 %

## 2023-01-25 DIAGNOSIS — I82.502 CHRONIC DEEP VEIN THROMBOSIS (DVT) OF LEFT LOWER EXTREMITY, UNSPECIFIED VEIN (H): ICD-10-CM

## 2023-01-25 DIAGNOSIS — D68.51 FACTOR 5 LEIDEN MUTATION, HETEROZYGOUS (H): ICD-10-CM

## 2023-01-25 PROCEDURE — G0463 HOSPITAL OUTPT CLINIC VISIT: HCPCS | Performed by: INTERNAL MEDICINE

## 2023-01-25 PROCEDURE — 99205 OFFICE O/P NEW HI 60 MIN: CPT | Performed by: INTERNAL MEDICINE

## 2023-01-25 NOTE — PATIENT INSTRUCTIONS
[facility]  [facility]  [facility] [address], [place] [address]  Main: [phone], Fax: [phone]    It was a pleasure seeing you today.  Thank you for allowing us to be involved in your care. Please let us know if there is anything else we can do for you, so that we can be sure you are leaving completely satisfied with your care experience.    Please stay on your blood thinner:  Eliquis  Please call us with any bleeding issues that you may have.    We would like you to repeat your imaging around March 25th.      Wear compression stockings if you have swelling in your leg. Take them off while you are sleeping. You may need to get new stockings every 3-6 months with regular wear.    Patient Resources:   For additional information, please see the following web links:  www.stoptheclot.org, www.clotconnect.org.    Call the [facility]  at [phone].     -If surgeries or procedures are planned (for holding instructions).     -If off anticoagulation, please call during high risk times (long-distance travel, broken bones or trauma, immobilization, surgery, pregnancy, or taking estrogen).     -Any new symptoms of DVT (deep vein thrombosis) or PE (pulmonary embolism)    -pain     -swelling     -redness    -warmth    -shortness of breath    -chest pain    -coughing up blood    Return to clinic after your ultrasound in March.  Please schedule return appointment with Dr. White -video visit.    Your nurse clinician is Zeinab Collier -[phone].   If she is unavailable and you have immediate concerns, please call [phone] and ask for a nurse.

## 2023-01-25 NOTE — PROGRESS NOTES
[facility]  [address] [place] [address]  Phone: [phone], Fax: [phone]    Outpatient Visit Note:    Patient: Nando Wolfe  MRN: [id number]  : 1965  JAYSHREE: 2023     Reason for Consultation:  DVT    Assessment: Nando Wolfe is a 57 year old man with a travel provoked proximal DVT, who has completed about 4 months of anticoagulation, with persistent DVT despite lack of symptoms.  For treatment, I recommended he remain on anticoagulation a full 6 months, and repeat his ultrasound.  At that point any residual DVT is chronic and would not benefit from ongoing treatment.  Given that this was a travel associated DVT, there is recurrence is not sufficiently high to justify indefinite anticoagulation, however, he agrees he like to take intermittent prophylaxis when he travels, or is hospitalized for surgery.  His factor V Leiden heterozygosity has no significant impact on his risk profile for recurrence.    Recommendations:  1. I counseled the patient about my assessment of causes for this DVT, its natural history and treatment, as well as strategies for prophylaxis.  2. He will remain on Eliquis until his next ultrasound at 6 months, which would be 2023  3. I will meet with him in March to discuss whether he like to remain on anticoagulation any further or simply stop at that point.  I would not anticoagulate him longer than 1 year for acute treatment of DVT  4. For prophylaxis, he will take intermittent prophylaxis with travel greater than 4 hours, and for 30 days after surgery or hospitalization    60 minutes spent on the date of the encounter doing chart review, review of outside records, review of test results, interpretation of tests, patient visit and documentation     Catarino White MD   of Medicine, Division of Hematology, Oncology and Transplantation  [facility] [employer]      -------------------------------  History: Nando Wolfe is a 57 year old man with essentially no significant medical history, who presented in September 2022 with onset of acute left leg pain, and found to have a proximal DVT.  He reports that a couple days prior to this he and his wife had taken a 4-hour driving trip without stopping to [employer].  During that time he was driving and feeling very cramped in their vehicle.  He actually had to hold his leg in a very uncomfortable position and felt pressure at the back of his thigh during the whole trip.    Since then, he reports no new symptoms.  The pain and swelling has resolved.  He has had no chest pain or shortness of breath.  He is tolerating his anticoagulation well.  He was on warfarin for be.  But recently has been placed back on Eliquis.  He is not concerned about cost today.  He would like to avoid long-term daily anticoagulation if possible.    In the hospital, he was tested for antiphospholipid syndrome (this was negative), as well as inherited thrombophilias.  The only one that was positive was that he was heterozygous for factor V Leiden.  He is wonder about the significance of this and how this might impact any family counseling since he and his wife have 2 young children    He has no personal family history of unprovoked VTE.  He does have several second-degree relatives who had either trauma or surgically provoked VTE.    Current Outpatient Medications   Medication     apixaban ANTICOAGULANT (ELIQUIS) 5 MG tablet     No current facility-administered medications for this visit.       Physical Exam:  Vitals: B/P: 136/83, T: 97.9, P: 73, R: Data Unavailable, Wt: 217 lbs 6.4 oz Body mass index is 32.81 kg/m .   Exam:   Gen: Appears well, no distress  Ext: no edema  Skin: no ecchymoses or hematomas  Neuro: no focal deficits, affect and cognition are normal    Labs:  I reviewed his thrombophilia work-up from [facility] and is positive  only for heterozygosity for factor V Leiden.    Imaging:  Reviewed his ultrasound from [facility] which showed a proximal DVT involving femoral, popliteal, and posterior tibial vein.  He also had an ultrasound in the [facility] system which also showed chronic DVT involving the same veins.  Reviewed these reports together today in clinic.

## 2023-03-15 ENCOUNTER — OFFICE VISIT (OUTPATIENT)
Dept: FAMILY MEDICINE | Facility: CLINIC | Age: 58
End: 2023-03-15
Payer: COMMERCIAL

## 2023-03-15 VITALS
RESPIRATION RATE: 18 BRPM | SYSTOLIC BLOOD PRESSURE: 138 MMHG | BODY MASS INDEX: 32.58 KG/M2 | HEIGHT: 68 IN | OXYGEN SATURATION: 97 % | HEART RATE: 67 BPM | DIASTOLIC BLOOD PRESSURE: 84 MMHG | TEMPERATURE: 97.3 F | WEIGHT: 215 LBS

## 2023-03-15 DIAGNOSIS — I82.512 CHRONIC DEEP VEIN THROMBOSIS (DVT) OF FEMORAL VEIN OF LEFT LOWER EXTREMITY (H): Primary | ICD-10-CM

## 2023-03-15 DIAGNOSIS — J31.0 RHINITIS, NON-ALLERGIC: ICD-10-CM

## 2023-03-15 DIAGNOSIS — R06.83 SNORING: ICD-10-CM

## 2023-03-15 DIAGNOSIS — Z79.899 HIGH RISK MEDICATION USE: ICD-10-CM

## 2023-03-15 LAB
BASOPHILS # BLD AUTO: 0.1 10E3/UL (ref 0–0.2)
BASOPHILS NFR BLD AUTO: 1 %
D DIMER PPP FEU-MCNC: 1.16 UG/ML FEU (ref 0–0.5)
EOSINOPHIL # BLD AUTO: 0.1 10E3/UL (ref 0–0.7)
EOSINOPHIL NFR BLD AUTO: 3 %
ERYTHROCYTE [DISTWIDTH] IN BLOOD BY AUTOMATED COUNT: 12.3 % (ref 10–15)
HCT VFR BLD AUTO: 43.7 % (ref 40–53)
HGB BLD-MCNC: 15.1 G/DL (ref 13.3–17.7)
IMM GRANULOCYTES # BLD: 0 10E3/UL
IMM GRANULOCYTES NFR BLD: 0 %
LYMPHOCYTES # BLD AUTO: 1.3 10E3/UL (ref 0.8–5.3)
LYMPHOCYTES NFR BLD AUTO: 24 %
MCH RBC QN AUTO: 29.5 PG (ref 26.5–33)
MCHC RBC AUTO-ENTMCNC: 34.6 G/DL (ref 31.5–36.5)
MCV RBC AUTO: 85 FL (ref 78–100)
MONOCYTES # BLD AUTO: 0.4 10E3/UL (ref 0–1.3)
MONOCYTES NFR BLD AUTO: 8 %
NEUTROPHILS # BLD AUTO: 3.3 10E3/UL (ref 1.6–8.3)
NEUTROPHILS NFR BLD AUTO: 64 %
PLATELET # BLD AUTO: 199 10E3/UL (ref 150–450)
RBC # BLD AUTO: 5.12 10E6/UL (ref 4.4–5.9)
WBC # BLD AUTO: 5.2 10E3/UL (ref 4–11)

## 2023-03-15 PROCEDURE — 85379 FIBRIN DEGRADATION QUANT: CPT | Performed by: INTERNAL MEDICINE

## 2023-03-15 PROCEDURE — 36415 COLL VENOUS BLD VENIPUNCTURE: CPT | Performed by: INTERNAL MEDICINE

## 2023-03-15 PROCEDURE — 99213 OFFICE O/P EST LOW 20 MIN: CPT | Performed by: INTERNAL MEDICINE

## 2023-03-15 PROCEDURE — 85025 COMPLETE CBC W/AUTO DIFF WBC: CPT | Performed by: INTERNAL MEDICINE

## 2023-03-15 RX ORDER — AZELASTINE 1 MG/ML
2 SPRAY, METERED NASAL 2 TIMES DAILY
Qty: 30 ML | Refills: 5 | Status: SHIPPED | OUTPATIENT
Start: 2023-03-15 | End: 2023-05-30

## 2023-03-15 ASSESSMENT — PAIN SCALES - GENERAL: PAINLEVEL: NO PAIN (0)

## 2023-03-15 NOTE — PROGRESS NOTES
[facility] Internal Medicine Progress Note           Assessment and Plan:     Chronic deep vein thrombosis (DVT) of femoral vein of left lower extremity (H)  - D dimer, quantitative    High risk medication use  - CBC with platelets and differential    Snoring  - Adult Sleep Eval & Management  Referral; Future    Rhinitis, non-allergic  - azelastine (ASTELIN) 0.1 % nasal spray; Spray 2 sprays into both nostrils 2 times daily         Interval History:   Reason for visit: *DVT follow-up   Onset: 9/3/2022  Description: Swelling of right leg not associated with trauma, surgery nor sedentary lifestyle.  Course: improving  Associated symptoms: Denies any pain of right leg and swelling also subsided significantly.  History: None  Evaluation: Yes.   Precipitating factor: Heterozygous for Factor 5 Leiden and venous stasis.  Alleviating factor: none  Complications: no pulmonary embolism  Therapies tried and outcome: Eliquis.            Significant Problems:   Patient Active Problem List   Diagnosis     CARDIOVASCULAR SCREENING; LDL GOAL LESS THAN 160     Obese     Back pain with radiation     Elevated blood sugar     DDD (degenerative disc disease), lumbar     Discogenic syndrome, lumbar     Acute deep vein thrombosis (DVT) of left lower extremity, unspecified vein (H)     Factor 5 Leiden mutation, heterozygous (H)              Review of Systems:   CONSTITUTIONAL: NEGATIVE for fever, chills, change in weight  INTEGUMENTARY/SKIN: NEGATIVE for worrisome rashes, moles or lesions  EYES: NEGATIVE for vision changes or irritation  ENT/MOUTH: NEGATIVE for ear, mouth and throat problems  RESP: NEGATIVE for significant cough or SOB  CV: NEGATIVE for chest pain, palpitations or peripheral edema  GI: NEGATIVE for nausea, abdominal pain, heartburn, or change in bowel habits  : NEGATIVE for frequency, dysuria, or hematuria  MUSCULOSKELETAL: NEGATIVE for significant arthralgias or myalgia  NEURO: NEGATIVE for  "weakness, dizziness or paresthesias  ENDOCRINE: NEGATIVE for temperature intolerance, skin/hair changes  HEME/ALLERGY/IMMUNE: As above.  PSYCHIATRIC: NEGATIVE for changes in mood or affect            Medications:     Current Outpatient Medications   Medication Sig     apixaban ANTICOAGULANT (ELIQUIS) 5 MG tablet Take 1 tablet (5 mg) by mouth 2 times daily     azelastine (ASTELIN) 0.1 % nasal spray Spray 2 sprays into both nostrils 2 times daily     No current facility-administered medications for this visit.             Physical Exam:   /84   Pulse 67   Temp 97.3  F (36.3  C) (Oral)   Resp 18   Ht 1.734 m (5' 8.25\")   Wt 97.5 kg (215 lb)   SpO2 97%   BMI 32.45 kg/m    Constitutional: Awake, alert, cooperative, no apparent distress, and appears stated age.  Eyes: extra-ocular muscles intact and sclera clear  ENT: normocepalic, without obvious abnormality  Lungs: no increased work of breathing and no retractions  Cardiovascular: regular rate and rhythm  Musculoskeletal: Non-pitting edema of the left lower extremity.  Neuropsychiatric: Normal affect, mood, orientation, memory and insight.          Data:   Impression    IMPRESSION: Occlusive acute appearing deep venous thrombosis throughout the femoral vein in the upper, mid and distal thigh extending into the popliteal vein and calf veins.     No DVT in the common femoral vein or profunda femoris.     Technologist communicated these findings to the triage nurse to expedite patient status.     REPORT SIGNED BY DR. Raul Pizarro    Exam: Ultrasound of the deep venous system of left leg dated  12/12/2022 1:19 PM     Clinical information: Follow-up known left leg deep venous thrombosis.     Comparison: 9/10/2022 report, images are not available (ultrasound  from [facility]).   Impression:     Left leg: Chronic deep venous thrombosis extending from the mid left  femoral vein through the popliteal vein and into the posterior tibial  and peroneal " "veins. Only the ultrasound report is available for  comparison (images unavailable), the thrombus burden is likely  slightly improved given absence of thrombus in the upper/proximal  femoral vein on today's study.     Reference: \"Duplex Ultrasound in the Diagnosis of Lower-Extremity Deep  Venous Thrombosis\"- Maddi Clancy MD, S; Campbell Kay MD  (Circulation. 2014;129:917-921. http://circ.ahajournals.org )     KATHERINE VALENCIA     Disposition:  Follow-up in 12 weeks or as needed.      Santos Duenas MD  Internal Medicine  [facility] Team    "

## 2023-03-24 ENCOUNTER — ANCILLARY PROCEDURE (OUTPATIENT)
Dept: ULTRASOUND IMAGING | Facility: CLINIC | Age: 58
End: 2023-03-24
Attending: INTERNAL MEDICINE
Payer: COMMERCIAL

## 2023-03-24 DIAGNOSIS — I82.502 CHRONIC DEEP VEIN THROMBOSIS (DVT) OF LEFT LOWER EXTREMITY, UNSPECIFIED VEIN (H): ICD-10-CM

## 2023-03-24 PROCEDURE — 93971 EXTREMITY STUDY: CPT | Mod: LT | Performed by: RADIOLOGY

## 2023-04-06 ENCOUNTER — VIRTUAL VISIT (OUTPATIENT)
Dept: HEMATOLOGY | Facility: CLINIC | Age: 58
End: 2023-04-06
Attending: INTERNAL MEDICINE
Payer: COMMERCIAL

## 2023-04-06 DIAGNOSIS — I82.502 CHRONIC DEEP VEIN THROMBOSIS (DVT) OF LEFT LOWER EXTREMITY, UNSPECIFIED VEIN (H): Primary | ICD-10-CM

## 2023-04-06 PROCEDURE — 99214 OFFICE O/P EST MOD 30 MIN: CPT | Mod: VID | Performed by: INTERNAL MEDICINE

## 2023-04-06 NOTE — PROGRESS NOTES
Patient was contacted to complete the pre-visit call prior to their video visit with the provider.      Allergies and medications were reviewed.    I thanked them for their time to cover this information     Azeb Vargas CMA

## 2023-04-06 NOTE — PROGRESS NOTES
Due to the ongoing COVID-19 outbreak, this visit was conducted by video, with the patient's approval.        [facility]  [facility] [place] [address]  Phone: [phone], Fax: [phone]      Outpatient Visit Note:    Patient: Nando Wolfe  MRN: [id number]  : 1965  JAYSHREE: 2023    Nando Wolfe is a 57 year old man with a history of travel-associated DVT who returns for routine follow up.      Assessment:  In summary, Nando Wolfe is a 57 year old man with a history of travel-associated DVT who returns for routine follow up after having completed 6 months of anticoagulation for treatment.  He does have significant chronic VTE burden but no symptoms.    Recommendations:  1. I counseled the patient about my assessment of risks and benefits on indefinite vs interm\ittent prophyalxis  2. He has completed treatment so will change to intermittent prophylaxis, only with travel and for 30 days after hospitalization or surgery    30 minutes spent by me on the date of the encounter doing chart review, review of test results, interpretation of tests, patient visit and documentation       Catarino White MD   of Medicine, Division of Hematology, Oncology and Transplantation  [employer]     --------------------------------------------------------  Forward History:  See consult 2023:  2022 took 4 hour driving trip without frequent stops and very cramped setting.  Developed prox LLE DVT, APS testing neg.  Started on apixaban    History: Nando Wolfe is a 57 year old man with a suspected travel associated DVT who returns for routine followup after completing 6 months of anticoagulation.  He reports no leg discomfort or swelling.  We reviewed his ultrasound results.    Medications are reviewed in the EMR and notable for apixaban    Objective:  Exam:  There is no height or weight on file to calculate  BMI.   Constitutional: Appears well, no distress  Eyes: no discharge, injection or icterus  Respiratory: no cough or labored breathing  Skin: no rashes or petechiae  Neurological: no deficits appreciated, speech is fluent  Psych: affect is normal    Labs:  none    Imaging:  March 2023 Ultrasound  IMPRESSION:   1. LEFT deep venous thrombosis or chronic venous changes:       A. Femoral vein, mid thigh, occlusive, but labeled partially  compressible, unchanged from previous.       B. Femoral vein, distal thigh, non occlusive, but labeled non  compressible, unchanged from previous.       C. Popliteal vein, non occlusive, but labeled non compressible,  previously labeled partially compressible with a slower flatter  waveform.       D. Posterior tibial veins, non compressible, previously labeled  partially compressible.       E. Peroneal veins, partially compressible, unchanged from  previous.      Video-Visit Details:    Type of service:  Video Visit  Video Start Time: 302 PM  Video End Time (time video stopped): 323 PM    Originating Location (pt. Location): Home    Distant Location (provider location):  [facility]     Mode of Communication:  Video Conference via [employer]

## 2023-05-26 ENCOUNTER — MYC MEDICAL ADVICE (OUTPATIENT)
Dept: FAMILY MEDICINE | Facility: CLINIC | Age: 58
End: 2023-05-26
Payer: COMMERCIAL

## 2023-05-30 ENCOUNTER — ANCILLARY PROCEDURE (OUTPATIENT)
Dept: GENERAL RADIOLOGY | Facility: CLINIC | Age: 58
End: 2023-05-30
Attending: FAMILY MEDICINE
Payer: COMMERCIAL

## 2023-05-30 ENCOUNTER — OFFICE VISIT (OUTPATIENT)
Dept: URGENT CARE | Facility: URGENT CARE | Age: 58
End: 2023-05-30
Payer: COMMERCIAL

## 2023-05-30 ENCOUNTER — ANCILLARY PROCEDURE (OUTPATIENT)
Dept: ULTRASOUND IMAGING | Facility: CLINIC | Age: 58
End: 2023-05-30
Attending: STUDENT IN AN ORGANIZED HEALTH CARE EDUCATION/TRAINING PROGRAM
Payer: COMMERCIAL

## 2023-05-30 ENCOUNTER — OFFICE VISIT (OUTPATIENT)
Dept: PEDIATRICS | Facility: CLINIC | Age: 58
End: 2023-05-30
Payer: COMMERCIAL

## 2023-05-30 VITALS
HEART RATE: 71 BPM | BODY MASS INDEX: 32.74 KG/M2 | RESPIRATION RATE: 12 BRPM | DIASTOLIC BLOOD PRESSURE: 79 MMHG | WEIGHT: 216 LBS | SYSTOLIC BLOOD PRESSURE: 131 MMHG | HEIGHT: 68 IN | OXYGEN SATURATION: 98 %

## 2023-05-30 VITALS
BODY MASS INDEX: 32.6 KG/M2 | TEMPERATURE: 98.8 F | DIASTOLIC BLOOD PRESSURE: 85 MMHG | RESPIRATION RATE: 17 BRPM | SYSTOLIC BLOOD PRESSURE: 142 MMHG | HEART RATE: 76 BPM | WEIGHT: 216 LBS | OXYGEN SATURATION: 97 %

## 2023-05-30 DIAGNOSIS — Z86.718 PERSONAL HISTORY OF DVT (DEEP VEIN THROMBOSIS): ICD-10-CM

## 2023-05-30 DIAGNOSIS — M79.89 SWELLING OF RIGHT FOOT: ICD-10-CM

## 2023-05-30 DIAGNOSIS — D68.51 FACTOR 5 LEIDEN MUTATION, HETEROZYGOUS (H): ICD-10-CM

## 2023-05-30 DIAGNOSIS — M77.50 TENDONITIS OF ANKLE: Primary | ICD-10-CM

## 2023-05-30 DIAGNOSIS — M79.89 RIGHT LEG SWELLING: Primary | ICD-10-CM

## 2023-05-30 DIAGNOSIS — I82.402 ACUTE DEEP VEIN THROMBOSIS (DVT) OF LEFT LOWER EXTREMITY, UNSPECIFIED VEIN (H): ICD-10-CM

## 2023-05-30 DIAGNOSIS — M79.89 RIGHT LEG SWELLING: ICD-10-CM

## 2023-05-30 DIAGNOSIS — M79.661 PAIN OF RIGHT LOWER LEG: ICD-10-CM

## 2023-05-30 PROCEDURE — 99215 OFFICE O/P EST HI 40 MIN: CPT | Performed by: STUDENT IN AN ORGANIZED HEALTH CARE EDUCATION/TRAINING PROGRAM

## 2023-05-30 PROCEDURE — 73630 X-RAY EXAM OF FOOT: CPT | Mod: TC | Performed by: RADIOLOGY

## 2023-05-30 PROCEDURE — 73590 X-RAY EXAM OF LOWER LEG: CPT | Mod: TC | Performed by: RADIOLOGY

## 2023-05-30 PROCEDURE — 99207 PR FIRST ORDER ACUTE REFERRAL: CPT | Performed by: FAMILY MEDICINE

## 2023-05-30 PROCEDURE — 93971 EXTREMITY STUDY: CPT | Mod: RT | Performed by: RADIOLOGY

## 2023-05-30 RX ORDER — MELOXICAM 15 MG/1
15 TABLET ORAL DAILY
Qty: 30 TABLET | Refills: 0 | Status: SHIPPED | OUTPATIENT
Start: 2023-05-30 | End: 2024-05-02

## 2023-05-30 ASSESSMENT — PAIN SCALES - GENERAL
PAINLEVEL: MODERATE PAIN (4)
PAINLEVEL: MODERATE PAIN (4)

## 2023-05-30 NOTE — PATIENT INSTRUCTIONS
Meloxicam - anti-inflammatory - take once daily with food for 7-10 days.  May take daily as needed thereafter.    Walking boot - use until you are no longer having pain with walking.  May use as needed if it flares up from time to time.    Ice for 20 minutes every few hours, a couple times a day while at work.    Elevate when you are able. Above the heart is best but getting it up on a chair is helpful too.    Podiatry referral to use if needed.    Sendy Wood, CNP

## 2023-05-30 NOTE — PROGRESS NOTES
"  Assessment & Plan     Tendonitis of ankle  US neg for DVT. Tenderness to palpation of right medial malleolus on exam extending into the anterior proximal foot. X-ray of foot shows mild second TMT joint degenerative changes which is in the region of pain so this may be a contributing factor. I suspect his symptoms are related to strain/tendonitis and recommended walking boot for support and immobilization to allow for healing. Advised to remove the walking boot regularly throughout the day to do ROM exercises. Also advised elevating leg, icing every few hours when able and at least twice during work shift, meloxicam to treat inflammation for the next 7-10 days taken with food and if not gradually improving to see podiatrist to evaluate further.   - Ankle/Foot Bracing Supplies DME Walking Boot; Right; Non-pneumatic; Short  - meloxicam (MOBIC) 15 MG tablet; Take 1 tablet (15 mg) by mouth daily for 30 days  - Orthopedic  Referral; Future    Pain of right lower leg  - US Lower Extremity Venous Duplex Right; Future    Factor 5 Leiden mutation, heterozygous (H)  - US Lower Extremity Venous Duplex Right; Future    Personal history of DVT (deep vein thrombosis)  - US Lower Extremity Venous Duplex Right; Future      45 minutes spent by me on the date of the encounter doing chart review, review of test results, interpretation of tests, patient visit, documentation and discussion with family        BMI:   Estimated body mass index is 32.84 kg/m  as calculated from the following:    Height as of this encounter: 1.727 m (5' 8\").    Weight as of this encounter: 98 kg (216 lb).       Patient Instructions   Meloxicam - anti-inflammatory - take once daily with food for 7-10 days.  May take daily as needed thereafter.    Walking boot - use until you are no longer having pain with walking.  May use as needed if it flares up from time to time.    Ice for 20 minutes every few hours, a couple times a day while at " "work.    Elevate when you are able. Above the heart is best but getting it up on a chair is helpful too.    Podiatry referral to use if needed.    Sendy Wood CNP            No follow-ups on file.    KWADWO Baker CNP  [facility] LUANA Fernandez is a 57 year old, presenting for the following health issues:  Musculoskeletal Problem (DVT R/O)         View : No data to display.              HPI     Evaluation for possible DVT  Onset/Duration: 3 weeks ago 5/9/2023  Description:       Location: Right lower leg and ankle       Redness: no        Pain: moderate, 4/10 right now        Warmth: no        Joint swelling YES- \"a little bit\"   Progression of symptoms worse and comes and goes. Pain  Gets worse if he twists his ankle   Accompanying signs and symptoms:       Fevers: no        Numbness/tingling/weakness: YES- sometimes foot feels numb  \"feels tight\"         Chest pain/pleurisy: no        Shortness of breath: no   History        Trauma: no         Recent travel/when: no         Previous history of DVT: YES- DVT in left leg Sept 2022         Family history of DVT: no   Does have hx of Factor V         Recent surgery: no   Aggravating factors include: different positions when he twists ankle feels sore along the shin bone.   Feels better when elevated.    Therapies tried and outcome: wears compression socks.  Uses cold pack   Prior surgery on arteries of veins in this area: No      Patient Active Problem List   Diagnosis     CARDIOVASCULAR SCREENING; LDL GOAL LESS THAN 160     Obese     Back pain with radiation     Elevated blood sugar     DDD (degenerative disc disease), lumbar     Discogenic syndrome, lumbar     Acute deep vein thrombosis (DVT) of left lower extremity, unspecified vein (H)     Factor 5 Leiden mutation, heterozygous (H)     Current Outpatient Medications   Medication     meloxicam (MOBIC) 15 MG tablet     No current facility-administered medications for this " "visit.         Review of Systems   Constitutional, HEENT, cardiovascular, pulmonary, GI, , musculoskeletal, neuro, skin, endocrine and psych systems are negative, except as otherwise noted.      Objective    /79 (BP Location: Right arm, Patient Position: Sitting, Cuff Size: Adult Regular)   Pulse 71   Resp 12   Ht 1.727 m (5' 8\")   Wt 98 kg (216 lb)   SpO2 98%   BMI 32.84 kg/m    Body mass index is 32.84 kg/m .  Physical Exam   GENERAL: healthy, alert and no distress  EYES: Eyes grossly normal to inspection, PERRL and conjunctivae and sclerae normal  MS: +1 pitting edema of right foot and distal RLE; tenderness to palpation of right medial malleolus; pain elicited in right anterior distal LE with foot flexion and lateral rotation  SKIN: no suspicious lesions or rashes  NEURO: Normal strength and tone, mentation intact and speech normal  PSYCH: mentation appears normal, affect normal/bright    Results for orders placed or performed in visit on 05/30/23   US Lower Extremity Venous Duplex Right     Status: None    Narrative    EXAMINATION: US LOWER EXTREMITY VENOUS DUPLEX RIGHT, 5/30/2023 12:34  PM     COMPARISON: None.    HISTORY:  persistent right lower leg pain, hx DVT and factor V; repeat US to r/o  DVT; Pain of right lower leg; Factor 5 Leiden mutation, heterozygous  (H); Personal history of DVT (deep vein thrombosis)    TECHNIQUE:  Gray-scale evaluation with compression, spectral flow, and  color Doppler assessment of the deep venous system of the right leg  from groin to knee, and then at the ankle.    FINDINGS:  In the right lower extremity, the common femoral, superficial femoral,  popliteal and posterior tibial veins demonstrate normal  compressibility and blood flow.  There is normal compressibility,  phasicity, and augmentation in the left common femoral vein.      Impression    IMPRESSION:  1.  No evidence of right lower extremity deep venous thrombosis.    ALISE MCGRAW MD         SYSTEM ID:  " [id number]   Results for orders placed or performed in visit on 05/30/23   XR Tibia and Fibula Right 2 Views     Status: None    Narrative    XR TIBIA AND FIBULA RIGHT 2 VIEWS  5/30/2023 10:36 AM     HISTORY: swelling and tenderness distal R leg; Swelling of right foot  COMPARISON: None      Impression    IMPRESSION: Mild soft tissue swelling at the right ankle. No acute  fracture or malalignment.    JIMI COSTA MD         SYSTEM ID:  [id number]   Results for orders placed or performed in visit on 05/30/23   XR Foot Right G/E 3 Views     Status: None    Narrative    FOOT THREE VIEWS RIGHT  5/30/2023 10:36 AM     HISTORY: swelling, tender, R foot, leg; Right leg swelling  COMPARISON: None.      Impression    IMPRESSION: Mild soft tissue swelling around the ankle. No acute  fracture or malalignment. Mild second TMT joint degenerative changes.  Partial bony bridging of the fifth PIP joint. Small plantar calcaneal  enthesophyte.    JIMI COSTA MD         SYSTEM ID:  [id number]

## 2023-05-30 NOTE — PROGRESS NOTES
(M79.89) Right leg swelling  (primary encounter diagnosis)  Comment:   Plan: XR Foot Right G/E 3 Views            (M79.89) Swelling of right foot  Comment:   Plan: XR Tibia and Fibula Right 2 Views            (D68.51) Factor 5 Leiden mutation, heterozygous (H)  Comment:   Plan:     (I82.402) Acute deep vein thrombosis (DVT) of left lower extremity, unspecified vein (H)  Comment:   Plan:       Patient is having persistence of symptoms.  X-rays negative.  Initial DVT study was negative.  I think it should be repeated in view of his history and patient is agreeable.    He was referred to [facility] for additional work-up.    I did mention if DVT study continues to be negative, orthopedic opinion might be a good next step.            CHIEF COMPLAINT    Swelling of right foot and leg.      HISTORY    Jim is a 57-year-old man who has a 3 to 4-week history of right leg and foot swelling and tenderness.  The location of the pain is in the distal portion of his right leg into the ankle and foot.  It is not severe and he does not have an antalgic gait.    He does have a history of factor V and previous DVT in the left leg.  He was on Eliquis but discontinued that in April 2023.  He went to the ER at [facility] May 18 and was seen.  A DVT study was negative at that time.    He comes in today because of persistent symptoms.  Things have not changed much.    He denies any injury to this area.  Has not noticed warmth or redness or fever.  He does not have swelling of the left foot.  Left calf has always been a bit larger.      REVIEW OF SYSTEMS    No temperature.  No cough or short of breath.  No chest pain, palpitations.  No abdominal pain or distention.  No numbness or weakness.  No rashes.      EXAM  BP (!) 142/85   Pulse 76   Temp 98.8  F (37.1  C) (Tympanic)   Resp 17   Wt 98 kg (216 lb)   SpO2 97%   BMI 32.60 kg/m      Alert, NAD.  HEENT unremarkable.  Nonlabored breathing.  Abdomen nondistended, nontender.  Right  lower extremity  -1-2+ edema distal right leg and right foot.  No redness or warmth noted.  No palpable tenderness over right ankle and dorsum right foot.

## 2023-06-06 NOTE — PROGRESS NOTES
[facility] Dermatology Note  Encounter Date: Jun 7, 2023  Office Visit     Dermatology Problem List:  Last skin check 12/7/22  0. 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 Discussed the benign nature of the lesions. No treatment necessary at this time.   - Patient may elect for removal in future if become bothersome.     Procedures Performed:   Snip biopsy: After verbal consent, discussion of risks and beneftis including but not limited to bleeding, infection, scar and recurrence, lesion was prepped with alcohol, 0.4mL of 1% lidocaine with epinephrine was injected to obtain adequate anesthesia of the lesion on the right hip, the lesions was snip clipped with scissors and submitted to pathology.The patient tolerated the procedure and no complications were noted.    Follow-up: prn for new or changing lesions    Staff and Scribe:     Scribe Disclosure:   I, Nancy Ahumada, am serving as a scribe to document services personally performed by this physician, Kell Rodriguez PA-C, based on data collection and the provider's statements to me.   Provider Disclosure:   The documentation recorded by the scribe accurately reflects the services I personally performed and the decisions made by me.    All risks, benefits and alternatives were discussed with patient.  Patient is in agreement and understands the assessment and plan.  All questions were answered.    Kell Rodriguez PA-C, [facility]  [facility]: Phone: [phone], Fax: [phone]  [facility]: Phone: [phone],  Fax: [phone]  [facility]: Phone: [phone], Fax:  [phone]  ____________________________________    CC: Derm Problem (Skin tag right lower abdomen )    HPI:  Mr. Nando Wolfe is a(n) 57 year old male who presents today as a return patient for a skin tag removal.     Last seen 12/7/22 for a skin check. At that time a lesion c/w skin tag was noted on the right hip, pt deferred removal. Two lesions were noted on the right nasal dorsum, shave biopsy was recommended.       Patient is otherwise feeling well, without additional skin concerns.    Labs Reviewed:  N/A    Physical Exam:  Vitals: There were no vitals taken for this visit.  SKIN: Focused examination of the nose and right hip was performed.  - There is a 3-4cm skin colored, pedunculated lesion on the right hip.   - Right nasal dorsum: two benign appearing flesh colored papules  - No other lesions of concern on areas examined.             Medications:  Current Outpatient Medications   Medication     meloxicam (MOBIC) 15 MG tablet     No current facility-administered medications for this visit.      Past Medical History:   Patient Active Problem List   Diagnosis     CARDIOVASCULAR SCREENING; LDL GOAL LESS THAN 160     Obese     Back pain with radiation     Elevated blood sugar     DDD (degenerative disc disease), lumbar     Discogenic syndrome, lumbar     Acute deep vein thrombosis (DVT) of left lower extremity, unspecified vein (H)     Factor 5 Leiden mutation, heterozygous (H)     Past Medical History:   Diagnosis Date     DDD (degenerative disc disease), lumbar

## 2023-06-07 ENCOUNTER — OFFICE VISIT (OUTPATIENT)
Dept: DERMATOLOGY | Facility: CLINIC | Age: 58
End: 2023-06-07
Payer: COMMERCIAL

## 2023-06-07 DIAGNOSIS — D49.2 SKIN NEOPLASM: Primary | ICD-10-CM

## 2023-06-07 PROCEDURE — 88304 TISSUE EXAM BY PATHOLOGIST: CPT | Performed by: DERMATOLOGY

## 2023-06-07 PROCEDURE — 11200 RMVL SKIN TAGS UP TO&INC 15: CPT | Performed by: PHYSICIAN ASSISTANT

## 2023-06-07 PROCEDURE — 99212 OFFICE O/P EST SF 10 MIN: CPT | Mod: 25 | Performed by: PHYSICIAN ASSISTANT

## 2023-06-07 ASSESSMENT — PAIN SCALES - GENERAL: PAINLEVEL: NO PAIN (0)

## 2023-06-07 NOTE — NURSING NOTE
Nando Wolfe's goals for this visit include:   Chief Complaint   Patient presents with     Derm Problem     Skin tag right lower abdomen        He requests these members of his care team be copied on today's visit information:       PCP: Santos Duenas    Referring Provider:  No referring provider defined for this encounter.    There were no vitals taken for this visit.    Do you need any medication refills at today's visit? Alicja Godoy CMA on 6/7/2023 at 4:13 PM

## 2023-06-07 NOTE — LETTER
6/7/2023         RE: Nando Wolfe  [address]  [place] [address]        Dear Colleague,    Thank you for referring your patient, Nando Wolfe, to the [facility]. Please see a copy of my visit note below.    [facility] Dermatology Note  Encounter Date: Jun 7, 2023  Office Visit     Dermatology Problem List:  Last skin check 12/7/22  0. 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 Discussed the benign nature of the lesions. No treatment necessary at this time.   - Patient may elect for removal in future if become bothersome.     Procedures Performed:   Snip biopsy: After verbal consent, discussion of risks and beneftis including but not limited to bleeding, infection, scar and recurrence, lesion was prepped with alcohol, 0.4mL of 1% lidocaine with epinephrine was injected to obtain adequate anesthesia of the lesion on the right hip, the lesions was snip clipped with scissors and submitted to pathology.The patient tolerated the procedure and no complications were noted.    Follow-up: prn for new or changing lesions    Staff and Scribe:     Scribe Disclosure:   I, Nancy Ahumada, am serving as a scribe to document services personally performed by this physician, Kell Rodriguez PA-C, based on data collection and the provider's statements to me.   Provider Disclosure:   The documentation recorded by the scribe accurately reflects the services I personally performed and the decisions made by me.    All risks, benefits and alternatives were discussed with patient.  Patient is in agreement and understands the assessment and plan.  All questions were answered.    Kell Rodriguez PA-C,  MPAS  [facility]: Phone: [phone], Fax: [phone]  [facility]: Phone: [phone],  Fax: [phone]  [facility]: Phone: [phone], Fax: [phone]  ____________________________________    CC: Derm Problem (Skin tag right lower abdomen )    HPI:  Mr. Nando Wolfe is a(n) 57 year old male who presents today as a return patient for a skin tag removal.     Last seen 12/7/22 for a skin check. At that time a lesion c/w skin tag was noted on the right hip, pt deferred removal. Two lesions were noted on the right nasal dorsum, shave biopsy was recommended.       Patient is otherwise feeling well, without additional skin concerns.    Labs Reviewed:  N/A    Physical Exam:  Vitals: There were no vitals taken for this visit.  SKIN: Focused examination of the nose and right hip was performed.  - There is a 3-4cm skin colored, pedunculated lesion on the right hip.   - Right nasal dorsum: two benign appearing flesh colored papules  - No other lesions of concern on areas examined.             Medications:  Current Outpatient Medications   Medication     meloxicam (MOBIC) 15 MG tablet     No current facility-administered medications for this visit.      Past Medical History:   Patient Active Problem List   Diagnosis     CARDIOVASCULAR SCREENING; LDL GOAL LESS THAN 160     Obese     Back pain with radiation     Elevated blood sugar     DDD (degenerative disc disease), lumbar     Discogenic syndrome, lumbar     Acute deep vein thrombosis (DVT) of left lower extremity, unspecified vein (H)     Factor 5 Leiden mutation, heterozygous (H)     Past Medical History:   Diagnosis Date     DDD (degenerative disc disease), lumbar        Again, thank you for allowing me to participate in the care of your patient.        Sincerely,        Kell Rodriguez PA-C

## 2023-06-09 LAB
PATH REPORT.COMMENTS IMP SPEC: NORMAL
PATH REPORT.COMMENTS IMP SPEC: NORMAL
PATH REPORT.FINAL DX SPEC: NORMAL
PATH REPORT.GROSS SPEC: NORMAL
PATH REPORT.MICROSCOPIC SPEC OTHER STN: NORMAL
PATH REPORT.RELEVANT HX SPEC: NORMAL

## 2023-06-18 ENCOUNTER — MYC MEDICAL ADVICE (OUTPATIENT)
Dept: FAMILY MEDICINE | Facility: CLINIC | Age: 58
End: 2023-06-18
Payer: COMMERCIAL

## 2023-06-19 NOTE — TELEPHONE ENCOUNTER
DIAGNOSIS: Tendonitis of ankle [M77.50]   APPOINTMENT DATE: 06/26/2023   NOTES STATUS DETAILS   OFFICE NOTE from referring provider Internal 05/30/2023 Sendy Wood [facility]    OFFICE NOTE from other specialist N/A    DISCHARGE SUMMARY from hospital N/A    DISCHARGE REPORT from the ER N/A    OPERATIVE REPORT N/A    EMG report N/A    MEDICATION LIST N/A    MRI N/A    DEXA (osteoporosis/bone health) N/A    CT SCAN N/A    XRAYS (IMAGES & REPORTS) Internal 05/30/2023 RT foot, tibia fibula

## 2023-06-26 ENCOUNTER — ANCILLARY PROCEDURE (OUTPATIENT)
Dept: GENERAL RADIOLOGY | Facility: CLINIC | Age: 58
End: 2023-06-26
Attending: PREVENTIVE MEDICINE
Payer: COMMERCIAL

## 2023-06-26 ENCOUNTER — OFFICE VISIT (OUTPATIENT)
Dept: ORTHOPEDICS | Facility: CLINIC | Age: 58
End: 2023-06-26
Attending: STUDENT IN AN ORGANIZED HEALTH CARE EDUCATION/TRAINING PROGRAM
Payer: COMMERCIAL

## 2023-06-26 ENCOUNTER — PRE VISIT (OUTPATIENT)
Dept: ORTHOPEDICS | Facility: CLINIC | Age: 58
End: 2023-06-26

## 2023-06-26 DIAGNOSIS — M51.369 DDD (DEGENERATIVE DISC DISEASE), LUMBAR: ICD-10-CM

## 2023-06-26 DIAGNOSIS — M51.16 LUMBAR DISC HERNIATION WITH RADICULOPATHY: ICD-10-CM

## 2023-06-26 DIAGNOSIS — G89.29 CHRONIC RADICULAR LUMBAR PAIN: ICD-10-CM

## 2023-06-26 DIAGNOSIS — M25.571 CHRONIC PAIN OF RIGHT ANKLE: ICD-10-CM

## 2023-06-26 DIAGNOSIS — M54.16 CHRONIC RADICULAR LUMBAR PAIN: Primary | ICD-10-CM

## 2023-06-26 DIAGNOSIS — G89.29 CHRONIC RADICULAR LUMBAR PAIN: Primary | ICD-10-CM

## 2023-06-26 DIAGNOSIS — G89.29 CHRONIC PAIN OF RIGHT ANKLE: ICD-10-CM

## 2023-06-26 DIAGNOSIS — M77.50 TENDONITIS OF ANKLE: ICD-10-CM

## 2023-06-26 DIAGNOSIS — M54.16 CHRONIC RADICULAR LUMBAR PAIN: ICD-10-CM

## 2023-06-26 PROCEDURE — 72100 X-RAY EXAM L-S SPINE 2/3 VWS: CPT | Performed by: RADIOLOGY

## 2023-06-26 PROCEDURE — 99204 OFFICE O/P NEW MOD 45 MIN: CPT | Performed by: PREVENTIVE MEDICINE

## 2023-06-26 RX ORDER — METHYLPREDNISOLONE 4 MG
TABLET, DOSE PACK ORAL
Qty: 21 TABLET | Refills: 0 | Status: SHIPPED | OUTPATIENT
Start: 2023-06-26 | End: 2024-05-02

## 2023-06-26 NOTE — PROGRESS NOTES
HISTORY OF PRESENT ILLNESS  Mr. Wolfe is a pleasant 57 year old year old male who presents to clinic today with the following:  What problem are you here for? Right foot and ankle swelling, has had 2 ultrasounds that were negative for blood clots   Has history of lumbar herniated disc with sciatica and reports that he has on/off had some sciatica in that leg for the past month      How long have you had this problem? 6 weeks    Have you had any recent imaging of this problem? Xrays/MRI/CT scans? Yes     Have you had treatments for this problem in the past?  -Medications? Yes, meloxicam  -Physical therapy? no  -Injections? no  -Surgery? no    How severe is this problem today? 0-10 scale? 3    How severe has this problem been at WORST in the past? 0-10 scale? 4    What do you think caused this problem? no    Does this problem or its symptoms cause difficulty for you falling asleep or staying asleep? no    Anything else you want us to know about this problem? no          MEDICAL HISTORY  Patient Active Problem List   Diagnosis     CARDIOVASCULAR SCREENING; LDL GOAL LESS THAN 160     Obese     Back pain with radiation     Elevated blood sugar     DDD (degenerative disc disease), lumbar     Discogenic syndrome, lumbar     Acute deep vein thrombosis (DVT) of left lower extremity, unspecified vein (H)     Factor 5 Leiden mutation, heterozygous (H)       Current Outpatient Medications   Medication Sig Dispense Refill     meloxicam (MOBIC) 15 MG tablet Take 1 tablet (15 mg) by mouth daily for 30 days 30 tablet 0       No Known Allergies    Family History   Problem Relation Age of Onset     Cerebrovascular Disease Maternal Grandfather      Heart Disease Paternal Grandfather      Cerebrovascular Disease Maternal Uncle      Breast Cancer Paternal Aunt      Colon Cancer Paternal Uncle      Diabetes No family hx of      Coronary Artery Disease No family hx of      Hypertension No family hx of      Hyperlipidemia No family hx of       Prostate Cancer No family hx of      Depression No family hx of      Anxiety Disorder No family hx of      Asthma No family hx of      Thyroid Disease No family hx of      Genetic Disorder No family hx of      Social History     Socioeconomic History     Marital status:    Tobacco Use     Smoking status: Former     Packs/day: 0.50     Types: Cigarettes     Quit date: 5/1/2013     Years since quitting: 10.1     Smokeless tobacco: Never   Vaping Use     Vaping Use: Never used   Substance and Sexual Activity     Alcohol use: Yes     Comment: 4-5 drinks per wk     Drug use: No     Sexual activity: Yes     Partners: Female     Birth control/protection: Condom, Female Surgical   Other Topics Concern     Parent/sibling w/ CABG, MI or angioplasty before 65F 55M? No      Service No     Blood Transfusions No     Caffeine Concern No     Occupational Exposure No     Hobby Hazards No     Sleep Concern Yes     Stress Concern Yes     Weight Concern Yes     Special Diet No     Back Care No     Exercise Yes     Comment: walking     Bike Helmet No     Seat Belt Yes     Self-Exams No       Additional medical/Social/Surgical histories reviewed in EPIC and updated as appropriate.     REVIEW OF SYSTEMS (6/26/2023)  10 point ROS of systems including Constitutional, Eyes, Respiratory, Cardiovascular, Gastroenterology, Genitourinary, Integumentary, Musculoskeletal, Psychiatric, Allergic/Immunologic were all negative except for pertinent positives noted in my HPI.     PHYSICAL EXAM  VSS    General  - normal appearance, in no obvious distress  HEENT  - conjunctivae not injected, moist mucous membranes, normocephalic/atraumatic head, ears normal appearance, no lesions, mouth normal appearance, no scars, normal dentition and teeth present  CV  - normal peripheral perfusion  Pulm  - normal respiratory pattern, non-labored  Musculoskeletal - lumbar spine  - stance: normal gait without limp, no obvious leg length discrepancy,  normal heel and toe walk  - inspection: normal bone and joint alignment, no obvious scoliosis  - palpation: no paravertebral or bony tenderness  - ROM: flexion slightly exacerbates pain, abnormal extension, sidebending, rotation are all limited by stiffness  - strength: lower extremities 5/5 in all planes  - special tests:  (+) straight leg raise- some calf pain  (+) slump test  Neuro  - patellar and Achilles DTRs 2+ bilaterally, some mild right lower extremity sensory deficit throughout L5 distribution, grossly normal coordination, normal muscle tone  Skin  - no ecchymosis, erythema, warmth, or induration, no obvious rash  Psych  - interactive, appropriate, normal mood and affect  Right ankle: has some ttp over posterior tibial tendon, and anterior ankle joint, with mild edema in foot and ankle similar to left side  ASSESSMENT & PLAN  56 yo male with right ankle pain and swelling, not resolving, due to posterior tibial tendon injury or ankle arthritis and lumbar disc herniation, ddd, radicular pain    I independently reviewed the following imaging studies:  Lumbar xrays: shows ddd  Right ankle xrays: shows some arthritis  Concern for ankle tendon injury vs. Lumbar herniated disc with radiculopathy  Discussed and ordered right ankle MRI and lumbar MRI for further evaluation as this has been limiting his activity, walking, and exercise for over 6 weeks  rx given for medrol pack  F/u after MRIs  Patient has been doing home exercise physical therapy program for this problem      Appropriate PPE was utilized for prevention of spread of Covid-19.  Parveen Rick MD, [facility]

## 2023-06-26 NOTE — LETTER
6/26/2023         RE: Nando Wolfe  [address]  [place] [address]        Dear Colleague,    Thank you for referring your patient, Nando Wolfe, to the [facility] [place]. Please see a copy of my visit note below.    HISTORY OF PRESENT ILLNESS  Mr. Wolfe is a pleasant 57 year old year old male who presents to clinic today with the following:  What problem are you here for? Right foot and ankle swelling, has had 2 ultrasounds that were negative for blood clots   Has history of lumbar herniated disc with sciatica and reports that he has on/off had some sciatica in that leg for the past month      How long have you had this problem? 6 weeks    Have you had any recent imaging of this problem? Xrays/MRI/CT scans? Yes     Have you had treatments for this problem in the past?  -Medications? Yes, meloxicam  -Physical therapy? no  -Injections? no  -Surgery? no    How severe is this problem today? 0-10 scale? 3    How severe has this problem been at WORST in the past? 0-10 scale? 4    What do you think caused this problem? no    Does this problem or its symptoms cause difficulty for you falling asleep or staying asleep? no    Anything else you want us to know about this problem? no          MEDICAL HISTORY  Patient Active Problem List   Diagnosis     CARDIOVASCULAR SCREENING; LDL GOAL LESS THAN 160     Obese     Back pain with radiation     Elevated blood sugar     DDD (degenerative disc disease), lumbar     Discogenic syndrome, lumbar     Acute deep vein thrombosis (DVT) of left lower extremity, unspecified vein (H)     Factor 5 Leiden mutation, heterozygous (H)       Current Outpatient Medications   Medication Sig Dispense Refill     meloxicam (MOBIC) 15 MG tablet Take 1 tablet (15 mg) by mouth daily for 30 days 30 tablet 0       No Known Allergies    Family History   Problem Relation Age of Onset     Cerebrovascular Disease Maternal Grandfather      Heart Disease  Paternal Grandfather      Cerebrovascular Disease Maternal Uncle      Breast Cancer Paternal Aunt      Colon Cancer Paternal Uncle      Diabetes No family hx of      Coronary Artery Disease No family hx of      Hypertension No family hx of      Hyperlipidemia No family hx of      Prostate Cancer No family hx of      Depression No family hx of      Anxiety Disorder No family hx of      Asthma No family hx of      Thyroid Disease No family hx of      Genetic Disorder No family hx of      Social History     Socioeconomic History     Marital status:    Tobacco Use     Smoking status: Former     Packs/day: 0.50     Types: Cigarettes     Quit date: 5/1/2013     Years since quitting: 10.1     Smokeless tobacco: Never   Vaping Use     Vaping Use: Never used   Substance and Sexual Activity     Alcohol use: Yes     Comment: 4-5 drinks per wk     Drug use: No     Sexual activity: Yes     Partners: Female     Birth control/protection: Condom, Female Surgical   Other Topics Concern     Parent/sibling w/ CABG, MI or angioplasty before 65F 55M? No      Service No     Blood Transfusions No     Caffeine Concern No     Occupational Exposure No     Hobby Hazards No     Sleep Concern Yes     Stress Concern Yes     Weight Concern Yes     Special Diet No     Back Care No     Exercise Yes     Comment: walking     Bike Helmet No     Seat Belt Yes     Self-Exams No       Additional medical/Social/Surgical histories reviewed in [facility] and updated as appropriate.     REVIEW OF SYSTEMS (6/26/2023)  10 point ROS of systems including Constitutional, Eyes, Respiratory, Cardiovascular, Gastroenterology, Genitourinary, Integumentary, Musculoskeletal, Psychiatric, Allergic/Immunologic were all negative except for pertinent positives noted in my HPI.     PHYSICAL EXAM  VSS    General  - normal appearance, in no obvious distress  HEENT  - conjunctivae not injected, moist mucous membranes, normocephalic/atraumatic head, ears normal  appearance, no lesions, mouth normal appearance, no scars, normal dentition and teeth present  CV  - normal peripheral perfusion  Pulm  - normal respiratory pattern, non-labored  Musculoskeletal - lumbar spine  - stance: normal gait without limp, no obvious leg length discrepancy, normal heel and toe walk  - inspection: normal bone and joint alignment, no obvious scoliosis  - palpation: no paravertebral or bony tenderness  - ROM: flexion slightly exacerbates pain, abnormal extension, sidebending, rotation are all limited by stiffness  - strength: lower extremities 5/5 in all planes  - special tests:  (+) straight leg raise- some calf pain  (+) slump test  Neuro  - patellar and Achilles DTRs 2+ bilaterally, some mild right lower extremity sensory deficit throughout L5 distribution, grossly normal coordination, normal muscle tone  Skin  - no ecchymosis, erythema, warmth, or induration, no obvious rash  Psych  - interactive, appropriate, normal mood and affect  Right ankle: has some ttp over posterior tibial tendon, and anterior ankle joint, with mild edema in foot and ankle similar to left side  ASSESSMENT & PLAN  56 yo male with right ankle pain and swelling, not resolving, due to posterior tibial tendon injury or ankle arthritis and lumbar disc herniation, ddd, radicular pain    I independently reviewed the following imaging studies:  Lumbar xrays: shows ddd  Right ankle xrays: shows some arthritis  Concern for ankle tendon injury vs. Lumbar herniated disc with radiculopathy  Discussed and ordered right ankle MRI and lumbar MRI for further evaluation as this has been limiting his activity, walking, and exercise for over 6 weeks  rx given for medrol pack  F/u after MRIs  Patient has been doing home exercise physical therapy program for this problem      Appropriate PPE was utilized for prevention of spread of Covid-19.  Parveen Rick MD, CAQSM        Again, thank you for allowing me to participate in the care of  your patient.        Sincerely,        Parveen Rick MD

## 2023-06-27 ENCOUNTER — TELEPHONE (OUTPATIENT)
Dept: ORTHOPEDICS | Facility: CLINIC | Age: 58
End: 2023-06-27
Payer: COMMERCIAL

## 2023-06-27 NOTE — TELEPHONE ENCOUNTER
Left Voicemail (1st Attempt) for the patient to call back and schedule the following:    Appointment type: return  Provider: dr. figueroa  Return date: couple days after mri  Specialty phone number: [phone]  Additional appointment(s) needed: mri's   Additonal Notes: follow up to discuss results of mri.     Domenica giordano Procedure   Orthopedics, Podiatry, Sports Medicine, Ent ,Eye , Audiology, Adult Endocrine & Diabetes, Nutrition & Medication Therapy Management Specialties   [facility]

## 2023-07-06 ENCOUNTER — ANCILLARY PROCEDURE (OUTPATIENT)
Dept: MRI IMAGING | Facility: CLINIC | Age: 58
End: 2023-07-06
Attending: PREVENTIVE MEDICINE
Payer: COMMERCIAL

## 2023-07-06 DIAGNOSIS — M51.16 LUMBAR DISC HERNIATION WITH RADICULOPATHY: ICD-10-CM

## 2023-07-06 DIAGNOSIS — M54.16 CHRONIC RADICULAR LUMBAR PAIN: ICD-10-CM

## 2023-07-06 DIAGNOSIS — G89.29 CHRONIC RADICULAR LUMBAR PAIN: ICD-10-CM

## 2023-07-06 DIAGNOSIS — M51.369 DDD (DEGENERATIVE DISC DISEASE), LUMBAR: ICD-10-CM

## 2023-07-06 DIAGNOSIS — M77.50 TENDONITIS OF ANKLE: ICD-10-CM

## 2023-07-06 DIAGNOSIS — M25.571 CHRONIC PAIN OF RIGHT ANKLE: ICD-10-CM

## 2023-07-06 DIAGNOSIS — G89.29 CHRONIC PAIN OF RIGHT ANKLE: ICD-10-CM

## 2023-07-06 PROCEDURE — 73721 MRI JNT OF LWR EXTRE W/O DYE: CPT | Mod: TC | Performed by: RADIOLOGY

## 2023-07-06 PROCEDURE — 72148 MRI LUMBAR SPINE W/O DYE: CPT | Mod: TC | Performed by: RADIOLOGY

## 2023-08-01 ENCOUNTER — TELEPHONE (OUTPATIENT)
Dept: ORTHOPEDICS | Facility: CLINIC | Age: 58
End: 2023-08-01
Payer: COMMERCIAL

## 2023-08-01 DIAGNOSIS — M77.50 TENDONITIS OF ANKLE: ICD-10-CM

## 2023-08-01 DIAGNOSIS — M51.369 DDD (DEGENERATIVE DISC DISEASE), LUMBAR: ICD-10-CM

## 2023-08-01 DIAGNOSIS — M51.16 LUMBAR DISC HERNIATION WITH RADICULOPATHY: Primary | ICD-10-CM

## 2023-08-01 RX ORDER — PREDNISONE 20 MG/1
40 TABLET ORAL DAILY
Qty: 14 TABLET | Refills: 0 | Status: SHIPPED | OUTPATIENT
Start: 2023-08-01 | End: 2024-05-02

## 2023-08-01 RX ORDER — MELOXICAM 15 MG/1
15 TABLET ORAL DAILY
Qty: 30 TABLET | Refills: 0 | Status: SHIPPED | OUTPATIENT
Start: 2023-08-01 | End: 2024-05-02

## 2023-08-01 NOTE — CONFIDENTIAL NOTE
Spoke to patient explained mri reports and ordered meds, epidural injection and physical therapy  Needs to be scheduled for physical therapy and epidural injection at either [facility] location or offer [facility] for availability  Dr Rick

## 2023-08-02 NOTE — TELEPHONE ENCOUNTER
8/2 Called and spoke to patient, appointments are currently scheduled.     Domenica giordano Procedure   Orthopedics, Podiatry, Sports Medicine, Ent ,Eye , Audiology, Adult Endocrine & Diabetes, Nutrition & Medication Therapy Management Specialties   [facility]

## 2023-08-03 ENCOUNTER — THERAPY VISIT (OUTPATIENT)
Dept: PHYSICAL THERAPY | Facility: CLINIC | Age: 58
End: 2023-08-03
Payer: COMMERCIAL

## 2023-08-03 DIAGNOSIS — M51.369 DDD (DEGENERATIVE DISC DISEASE), LUMBAR: ICD-10-CM

## 2023-08-03 DIAGNOSIS — M25.571 PAIN IN JOINT, ANKLE AND FOOT, RIGHT: ICD-10-CM

## 2023-08-03 DIAGNOSIS — M77.50 TENDONITIS OF ANKLE: ICD-10-CM

## 2023-08-03 DIAGNOSIS — M54.41 CHRONIC BILATERAL LOW BACK PAIN WITH RIGHT-SIDED SCIATICA: Primary | ICD-10-CM

## 2023-08-03 DIAGNOSIS — G89.29 CHRONIC BILATERAL LOW BACK PAIN WITH RIGHT-SIDED SCIATICA: Primary | ICD-10-CM

## 2023-08-03 DIAGNOSIS — M51.16 LUMBAR DISC HERNIATION WITH RADICULOPATHY: ICD-10-CM

## 2023-08-03 PROCEDURE — 97110 THERAPEUTIC EXERCISES: CPT | Mod: GP | Performed by: PHYSICAL THERAPIST

## 2023-08-03 PROCEDURE — 97162 PT EVAL MOD COMPLEX 30 MIN: CPT | Mod: GP | Performed by: PHYSICAL THERAPIST

## 2023-08-03 NOTE — PROGRESS NOTES
PHYSICAL THERAPY EVALUATION  Type of Visit: Evaluation    See electronic medical record for Abuse and Falls Screening details.    Subjective       Presenting condition or subjective complaint: pt reports serveral years ago had injury in low back and right leg and was diagnosed with DDD. Now recently his foot and ankle has been swollen on the right which is why he has returned for treatment. Last year did have blood clot in left so went in to for right had 2 US that were negative. Was ankle tendonitis and was in air cast for couple weeks but that made it worse.  Date of onset: 05/16/23    Relevant medical history: DVT (blood clot); Overweight   Dates & types of surgery:      Prior diagnostic imaging/testing results: MRI MRI of back ower lumbar spondylosis with severe L4-L5 and moderate L5-S1 degenerative interspace narrowing with associated endplate irregularity and Modic type II changes. Disc herniations, interbody spurring, interspace narrowing and facet degenerative changes   contribute to moderate to severe canal stenosis at L4-L5 and left lateral recess narrowing at L5-S1 without canal stenosis. There is moderate right and mild left L4-L5 and severe left and moderate right foraminal stenosis. MRI ankle Tiny focus of moderate chondromalacia at the navicular/medial cuneiform joint.  2.  Small focus of mild chondromalacia in the medial talar dome.  3.  No tendon tear, tendinopathy, or tenosynovitis.  4.  No ligament tear.  5.  No fracture or osseous stress response.  6.  Moderate subcutaneous edema about the ankle.   Prior therapy history for the same diagnosis, illness or injury: Yes 2011    Prior Level of Function  Transfers:   Ambulation:   ADL:   IADL:     Living Environment  Social support: With a significant other or spouse   Type of home: [facility]   Stairs to enter the home: Yes 8 Is there a railing: Yes   Ramp: No   Stairs inside the home: Yes 16 Is there a railing: Yes   Help at home: None  Equipment  owned:       Employment: Yes R and D technician.  Hobbies/Interests: swimming, hanging out with kids.    Patient goals for therapy: sitting, Numbness is bothersome all the time.    Pain assessment: Location: lateral shin / dorsal surface of foot/Ratin-3/10 PL     Objective   FOOT/ANKLE EVALUATION  PAIN: Pain Level at Rest: 1/10  Pain Level with Use: 3/10  Pain Location: ankle  Pain Quality: Aching and Numb  Pain Frequency: intermittent  Pain is Worst: no pattern  Pain is Exacerbated By: lifting heavy things  Pain is Relieved By: raking / walking, activity and movement  Pain Progression: Unchanged  INTEGUMENTARY (edema, incisions):   POSTURE:   GAIT:   Weightbearing Status:   Assistive Device(s):   Gait Deviations:   BALANCE/PROPRIOCEPTION:   WEIGHT BEARING ALIGNMENT:   NON-WEIGHTBEARING ALIGNMENT:    ROM:   (Degrees) Left AROM Left PROM  Right AROM Right PROM   Ankle Dorsiflexion 8  5    Ankle Plantarflexion 45  35    Ankle Inversion 32  8    Ankle Eversion 18  8    Great Toe Flexion       Great Toe Extension       Pain:   End feel:     Neurological:  Motor Deficit:  WNL   Reflexes:  WNL  Sensory Deficit:  WNL     Test Movements:   During: produces, abolishes, increases, decreases, no effect, centralizing, peripheralizing   After: better, worse, no better, no worse, no effect, centralized, peripheralized    Symptomatic response Mechanical response    During testing After testing Effect - increased ROM, decreased ROM, or key functional test No Effect   Pretest symptoms standing: 3/10 PL     Rep FIS No Effect    No Effect        Rep EIS No Effect    No Effect            Provisional Classification: Inconclusive/Other -     Potential Drivers of Pain and/or Disability:     Principle of Management:  Education:  trial of repeated flexion then / ext to rule in / out mechanical low back pain as source of ankle / foot pain    STRENGTH:   Pain: - none + mild ++ moderate +++ severe  Strength Scale: 0-5/5 Left Right   Ankle  Dorsiflexion 5 5   Ankle Plantarflexion 5 4   Ankle Inversion 5 5   Ankle Eversion 5 4   Great Toe Flexion     Great Toe Extension     Anterior Tibialis     Posterior Tibialis     Peroneals     Extensor Digitorum     Gastroc/Soleus       FLEXIBILITY:   SPECIAL TESTS:    Left Right   Tinel Sign     Brewster Sign     Windlass Test     Ligamentous Stability     Anterior Drawer     Kleiger ER Test     Longitudinal Arch Angle Test Negative,      Talar Tilt Positive,        FUNCTIONAL TESTS:   PALPATION:   JOINT MOBILITY:    Left Right   Tib-Fib Proximal     Tib-Fib Distal     Talocrural  Hypomobile   Subtalar  Hypomobile   Midtarsal  Hypomobile   First Ray       Signficant swelling in right foot / ankle, figure 8 measure , left 57cm, right 59cm.   Assessment & Plan   CLINICAL IMPRESSIONS  Medical Diagnosis: Lumbar disc herniation with radiculopathy  DDD (degenerative disc disease), lumbar  Tendonitis of ankle    Treatment Diagnosis: low back / right ankle / foot pain   Impression/Assessment: Patient is a 58 year old male with low back pain and right lower leg / ankle pain complaints.  The following significant findings have been identified: Pain, Decreased ROM/flexibility, Decreased joint mobility, Edema, and Decreased activity tolerance. These impairments interfere with their ability to perform self care tasks, recreational activities, and household chores as compared to previous level of function.     Clinical Decision Making (Complexity):  Clinical Presentation: Evolving/Changing  Clinical Presentation Rationale: based on medical and personal factors listed in PT evaluation  Clinical Decision Making (Complexity): Moderate complexity    PLAN OF CARE  Treatment Interventions:  Modalities: Ultrasound  Interventions: Manual Therapy, Neuromuscular Re-education, Therapeutic Activity, Therapeutic Exercise, Self-Care/Home Management    Long Term Goals     PT Goal 1  Goal Identifier: ambulation  Goal Description: pt will be  able to ambulate for 60 min with no pain and no swelling in foot after  Rationale: to maximize safety and independence with performance of ADLs and functional tasks  Goal Progress: ambulating 60 min 3/10 PL,  figure 8 swelling 59 cm compared to 57cm,  Target Date: 09/28/23      Frequency of Treatment: 1 x/ week  Duration of Treatment: 8 weeks    Recommended Referrals to Other Professionals:   Education Assessment:   Learner/Method: Patient;Demonstration;Pictures/Video;No Barriers to Learning    Risks and benefits of evaluation/treatment have been explained.   Patient/Family/caregiver agrees with Plan of Care.     Evaluation Time:     PT Eval, Moderate Complexity Minutes (08147): 25   Present: Not applicable     Signing Clinician: Blayne Wilcox PT

## 2023-08-16 ENCOUNTER — THERAPY VISIT (OUTPATIENT)
Dept: PHYSICAL THERAPY | Facility: CLINIC | Age: 58
End: 2023-08-16
Payer: COMMERCIAL

## 2023-08-16 DIAGNOSIS — G89.29 CHRONIC BILATERAL LOW BACK PAIN WITH RIGHT-SIDED SCIATICA: Primary | ICD-10-CM

## 2023-08-16 DIAGNOSIS — M25.571 PAIN IN JOINT, ANKLE AND FOOT, RIGHT: ICD-10-CM

## 2023-08-16 DIAGNOSIS — M54.41 CHRONIC BILATERAL LOW BACK PAIN WITH RIGHT-SIDED SCIATICA: Primary | ICD-10-CM

## 2023-08-16 PROCEDURE — 97110 THERAPEUTIC EXERCISES: CPT | Mod: GP | Performed by: PHYSICAL THERAPIST

## 2023-08-16 PROCEDURE — 97140 MANUAL THERAPY 1/> REGIONS: CPT | Mod: GP | Performed by: PHYSICAL THERAPIST

## 2023-08-21 ENCOUNTER — TRANSFERRED RECORDS (OUTPATIENT)
Dept: HEALTH INFORMATION MANAGEMENT | Facility: CLINIC | Age: 58
End: 2023-08-21
Payer: COMMERCIAL

## 2023-08-23 ENCOUNTER — THERAPY VISIT (OUTPATIENT)
Dept: PHYSICAL THERAPY | Facility: CLINIC | Age: 58
End: 2023-08-23
Payer: COMMERCIAL

## 2023-08-23 DIAGNOSIS — G89.29 CHRONIC BILATERAL LOW BACK PAIN WITH RIGHT-SIDED SCIATICA: Primary | ICD-10-CM

## 2023-08-23 DIAGNOSIS — M54.41 CHRONIC BILATERAL LOW BACK PAIN WITH RIGHT-SIDED SCIATICA: Primary | ICD-10-CM

## 2023-08-23 DIAGNOSIS — M25.571 PAIN IN JOINT, ANKLE AND FOOT, RIGHT: ICD-10-CM

## 2023-08-23 PROCEDURE — 97110 THERAPEUTIC EXERCISES: CPT | Mod: GP | Performed by: PHYSICAL THERAPIST

## 2023-08-23 PROCEDURE — 97140 MANUAL THERAPY 1/> REGIONS: CPT | Mod: GP | Performed by: PHYSICAL THERAPIST

## 2023-08-29 DIAGNOSIS — M51.16 LUMBAR DISC HERNIATION WITH RADICULOPATHY: Primary | ICD-10-CM

## 2023-08-29 DIAGNOSIS — M51.369 DDD (DEGENERATIVE DISC DISEASE), LUMBAR: ICD-10-CM

## 2023-08-29 NOTE — TELEPHONE ENCOUNTER
Prescription refill requested for:   Melxoicam (MOBIC) 15 MG tablet   Last Written Prescription Date:  8/29/23  Last Fill Quantity: 30,   # refills: 0  Last Office Visit: 6/26/23  Future Office visit:    Next 5 appointments (look out 90 days)      Sep 13, 2023  9:40 AM  (Arrive by 9:25 AM)  Return Visit with Parveen Rick MD  [facility] [place] ([facility] [id number] [place] [id number] [phone]                 Demarco Morrison, EMT

## 2023-08-30 ENCOUNTER — THERAPY VISIT (OUTPATIENT)
Dept: PHYSICAL THERAPY | Facility: CLINIC | Age: 58
End: 2023-08-30
Payer: COMMERCIAL

## 2023-08-30 DIAGNOSIS — M25.571 PAIN IN JOINT, ANKLE AND FOOT, RIGHT: ICD-10-CM

## 2023-08-30 DIAGNOSIS — G89.29 CHRONIC BILATERAL LOW BACK PAIN WITH RIGHT-SIDED SCIATICA: Primary | ICD-10-CM

## 2023-08-30 DIAGNOSIS — M54.41 CHRONIC BILATERAL LOW BACK PAIN WITH RIGHT-SIDED SCIATICA: Primary | ICD-10-CM

## 2023-08-30 PROCEDURE — 97140 MANUAL THERAPY 1/> REGIONS: CPT | Mod: GP | Performed by: PHYSICAL THERAPIST

## 2023-08-30 PROCEDURE — 97110 THERAPEUTIC EXERCISES: CPT | Mod: GP | Performed by: PHYSICAL THERAPIST

## 2023-09-01 RX ORDER — MELOXICAM 15 MG/1
15 TABLET ORAL DAILY
Qty: 30 TABLET | Refills: 0 | Status: SHIPPED | OUTPATIENT
Start: 2023-09-01 | End: 2024-05-02

## 2023-09-06 ENCOUNTER — THERAPY VISIT (OUTPATIENT)
Dept: PHYSICAL THERAPY | Facility: CLINIC | Age: 58
End: 2023-09-06
Payer: COMMERCIAL

## 2023-09-06 DIAGNOSIS — M54.41 CHRONIC BILATERAL LOW BACK PAIN WITH RIGHT-SIDED SCIATICA: Primary | ICD-10-CM

## 2023-09-06 DIAGNOSIS — G89.29 CHRONIC BILATERAL LOW BACK PAIN WITH RIGHT-SIDED SCIATICA: Primary | ICD-10-CM

## 2023-09-06 DIAGNOSIS — M25.571 PAIN IN JOINT, ANKLE AND FOOT, RIGHT: ICD-10-CM

## 2023-09-06 PROCEDURE — 97140 MANUAL THERAPY 1/> REGIONS: CPT | Mod: GP | Performed by: PHYSICAL THERAPIST

## 2023-09-06 PROCEDURE — 97110 THERAPEUTIC EXERCISES: CPT | Mod: GP | Performed by: PHYSICAL THERAPIST

## 2023-09-06 PROCEDURE — 97035 APP MDLTY 1+ULTRASOUND EA 15: CPT | Mod: GP | Performed by: PHYSICAL THERAPIST

## 2023-09-13 ENCOUNTER — OFFICE VISIT (OUTPATIENT)
Dept: ORTHOPEDICS | Facility: CLINIC | Age: 58
End: 2023-09-13
Payer: COMMERCIAL

## 2023-09-13 DIAGNOSIS — M51.16 LUMBAR DISC HERNIATION WITH RADICULOPATHY: Primary | ICD-10-CM

## 2023-09-13 DIAGNOSIS — M19.079 ARTHRITIS OF MIDFOOT: ICD-10-CM

## 2023-09-13 DIAGNOSIS — M19.071 ARTHRITIS OF RIGHT ANKLE: ICD-10-CM

## 2023-09-13 DIAGNOSIS — M77.50 TENDONITIS OF ANKLE: ICD-10-CM

## 2023-09-13 PROCEDURE — 20605 DRAIN/INJ JOINT/BURSA W/O US: CPT | Mod: RT | Performed by: PREVENTIVE MEDICINE

## 2023-09-13 PROCEDURE — 99214 OFFICE O/P EST MOD 30 MIN: CPT | Mod: 25 | Performed by: PREVENTIVE MEDICINE

## 2023-09-13 RX ORDER — METHYLPREDNISOLONE ACETATE 40 MG/ML
40 INJECTION, SUSPENSION INTRA-ARTICULAR; INTRALESIONAL; INTRAMUSCULAR; SOFT TISSUE
Status: DISCONTINUED | OUTPATIENT
Start: 2023-09-13 | End: 2024-05-02

## 2023-09-13 RX ORDER — PREDNISONE 20 MG/1
40 TABLET ORAL DAILY
Qty: 14 TABLET | Refills: 0 | Status: SHIPPED | OUTPATIENT
Start: 2023-09-13 | End: 2024-05-02

## 2023-09-13 RX ORDER — CELECOXIB 100 MG/1
100 CAPSULE ORAL 2 TIMES DAILY
Qty: 60 CAPSULE | Refills: 0 | Status: SHIPPED | OUTPATIENT
Start: 2023-09-13 | End: 2024-05-02

## 2023-09-13 RX ADMIN — METHYLPREDNISOLONE ACETATE 40 MG: 40 INJECTION, SUSPENSION INTRA-ARTICULAR; INTRALESIONAL; INTRAMUSCULAR; SOFT TISSUE at 10:38

## 2023-09-13 NOTE — LETTER
9/13/2023         RE: Nando Wolfe  [address]  [place] [address]        Dear Colleague,    Thank you for referring your patient, Nando Wolfe, to the [facility] [place]. Please see a copy of my visit note below.    HISTORY OF PRESENT ILLNESS  Mr. Wolfe is a pleasant 58 year old year old male who presents to clinic today with the following:  What problem are you here for? Right ankle mri and lumbar mri   Still having low back pain after KUNAL but slightly improving  And some radiuclar symptoms have improved as well  Has right foot pain and swelling with a lot of walking still  Wants to review MRIs and plan  How long have you had this problem? 4 months     Have you had any recent imaging of this problem? Xrays/MRI/CT scans? yes    Have you had treatments for this problem in the past?  -Medications? yes  -Physical therapy? yes  -Injections? yes  -Surgery? no    How severe is this problem today? 0-10 scale? 3-4    How severe has this problem been at WORST in the past? 0-10 scale? 5    What do you think caused this problem? na    Does this problem or its symptoms cause difficulty for you falling asleep or staying asleep? no    Anything else you want us to know about this problem? no          MEDICAL HISTORY  Patient Active Problem List   Diagnosis     CARDIOVASCULAR SCREENING; LDL GOAL LESS THAN 160     Obese     Back pain with radiation     Elevated blood sugar     DDD (degenerative disc disease), lumbar     Discogenic syndrome, lumbar     Acute deep vein thrombosis (DVT) of left lower extremity, unspecified vein (H)     Factor 5 Leiden mutation, heterozygous (H)     Chronic bilateral low back pain with right-sided sciatica     Pain in joint, ankle and foot, right       Current Outpatient Medications   Medication Sig Dispense Refill     meloxicam (MOBIC) 15 MG tablet Take 1 tablet (15 mg) by mouth daily 30 tablet 0     meloxicam (MOBIC) 15 MG tablet Take 1  tablet (15 mg) by mouth daily 30 tablet 0     meloxicam (MOBIC) 15 MG tablet Take 1 tablet (15 mg) by mouth daily for 30 days 30 tablet 0     methylPREDNISolone (MEDROL) 4 MG tablet therapy pack Follow Package Directions 21 tablet 0     predniSONE (DELTASONE) 20 MG tablet Take 2 tablets (40 mg) by mouth daily 14 tablet 0       No Known Allergies    Family History   Problem Relation Age of Onset     Cerebrovascular Disease Maternal Grandfather      Heart Disease Paternal Grandfather      Cerebrovascular Disease Maternal Uncle      Breast Cancer Paternal Aunt      Colon Cancer Paternal Uncle      Diabetes No family hx of      Coronary Artery Disease No family hx of      Hypertension No family hx of      Hyperlipidemia No family hx of      Prostate Cancer No family hx of      Depression No family hx of      Anxiety Disorder No family hx of      Asthma No family hx of      Thyroid Disease No family hx of      Genetic Disorder No family hx of      Social History     Socioeconomic History     Marital status:    Tobacco Use     Smoking status: Former     Packs/day: 0.50     Types: Cigarettes     Quit date: 5/1/2013     Years since quitting: 10.3     Smokeless tobacco: Never   Vaping Use     Vaping Use: Never used   Substance and Sexual Activity     Alcohol use: Yes     Comment: 4-5 drinks per wk     Drug use: No     Sexual activity: Yes     Partners: Female     Birth control/protection: Condom, Female Surgical   Other Topics Concern     Parent/sibling w/ CABG, MI or angioplasty before 65F 55M? No      Service No     Blood Transfusions No     Caffeine Concern No     Occupational Exposure No     Hobby Hazards No     Sleep Concern Yes     Stress Concern Yes     Weight Concern Yes     Special Diet No     Back Care No     Exercise Yes     Comment: walking     Bike Helmet No     Seat Belt Yes     Self-Exams No       Additional medical/Social/Surgical histories reviewed in [facility] and updated as appropriate.      REVIEW OF SYSTEMS (9/13/2023)  10 point ROS of systems including Constitutional, Eyes, Respiratory, Cardiovascular, Gastroenterology, Genitourinary, Integumentary, Musculoskeletal, Psychiatric, Allergic/Immunologic were all negative except for pertinent positives noted in my HPI.     PHYSICAL EXAM  VSS  General  - normal appearance, in no obvious distress  HEENT  - conjunctivae not injected, moist mucous membranes, normocephalic/atraumatic head, ears normal appearance, no lesions, mouth normal appearance, no scars, normal dentition and teeth present  CV  - normal peripheral perfusion  Pulm  - normal respiratory pattern, non-labored  Musculoskeletal - lumbar spine  - stance: normal gait without limp, no obvious leg length discrepancy, normal heel and toe walk  - inspection: normal bone and joint alignment, no obvious scoliosis  - palpation: no paravertebral or bony tenderness  - ROM: flexion exacerbates pain, normal extension, sidebending, rotation  - strength: lower extremities 5/5 in all planes  - special tests:  (+) straight leg raise  (+) slump test  Neuro  - patellar and Achilles DTRs 2+ bilaterally, some right lower extremity sensory deficit throughout L5 distribution, grossly normal coordination, normal muscle tone  Skin  - no ecchymosis, erythema, warmth, or induration, no obvious rash  Psych  - interactive, appropriate, normal mood and affect  Right foot: has ttp at medial midfoot  At medial navicular cuneiform joint with some swelling  ASSESSMENT & PLAN  57 yo male with right foot medial cuneiform/navicular arthritis, and lumbar ddd, disc herniations, radicular pain, not resolved    I independently reviewed the following imaging studies:  Lumbar MRI: shows ddd, disc herniations  Right foot MRI shows arthritis at medial navicular cuneiform joint  After a 20 minute discussion and examination, we decided to perform a same day injection for diagnostic and therapeutic purposes for  Right foot joint  midfoot  Discussed cont. HEP   Follow up in a few weeks and consider repeat Rosalio at L4/5 OR L5/S1   Superfeet recommended  Rx given for celebrex and prednisone    Patient HAS completed physical therapy for 4-6 weeks  Patient has been doing home exercise physical therapy program for this problem      Appropriate PPE was utilized for prevention of spread of Covid-19.  Parveen Rick MD, CAQSM    PROCEDURE: right foot medial navicular cuneiform joint injection     The patient was apprised of the risks and the benefits of the procedure and consented and a written consent was signed by the patient.   The patient s right foot was sterilely prepped with chloraprep.   40 mg of depo suspension was drawn up into a 5 mL syringe with 2  mL of 1% lidocaine   The patient was injected with a 1.5-inch 22-gauge needle at the medial foot at navicular cuneiform joint  There were no complications. The patient tolerated the procedure well. There was minimal bleeding.   The patient was instructed to ice the right foot upon leaving clinic and refrain from overuse over the next 3 days.   The patient was instructed to go to the emergency room with any usual pain, swelling, or redness occurred in the injected area.   The patient was given a followup appointment to evaluate response to the injection to their increased range of motion and reduction of pain.    followup PRN  Dr Parveen Rick     Medium Joint Injection/Arthrocentesis: R ankle    Date/Time: 9/13/2023 10:38 AM    Performed by: Parveen Rick MD  Authorized by: Parveen Rick MD    Indications:  Pain  Needle Size:  22 G  Guidance: surface landmarks    Approach:  Medial  Location:  Ankle  Location comment:  Medial navicular/cuneiform joint  Site:  R ankle  Medications:  40 mg methylPREDNISolone 40 MG/ML  Outcome:  Tolerated well, no immediate complications  Procedure discussed: discussed risks, benefits, and alternatives    Consent Given by:  Patient  Timeout: timeout  called immediately prior to procedure    Prep: patient was prepped and draped in usual sterile fashion          Again, thank you for allowing me to participate in the care of your patient.        Sincerely,        Parveen Rick MD

## 2023-09-13 NOTE — PROGRESS NOTES
HISTORY OF PRESENT ILLNESS  Mr. Wolfe is a pleasant 58 year old year old male who presents to clinic today with the following:  What problem are you here for? Right ankle mri and lumbar mri   Still having low back pain after KUNAL but slightly improving  And some radiuclar symptoms have improved as well  Has right foot pain and swelling with a lot of walking still  Wants to review MRIs and plan  How long have you had this problem? 4 months     Have you had any recent imaging of this problem? Xrays/MRI/CT scans? yes    Have you had treatments for this problem in the past?  -Medications? yes  -Physical therapy? yes  -Injections? yes  -Surgery? no    How severe is this problem today? 0-10 scale? 3-4    How severe has this problem been at WORST in the past? 0-10 scale? 5    What do you think caused this problem? na    Does this problem or its symptoms cause difficulty for you falling asleep or staying asleep? no    Anything else you want us to know about this problem? no          MEDICAL HISTORY  Patient Active Problem List   Diagnosis    CARDIOVASCULAR SCREENING; LDL GOAL LESS THAN 160    Obese    Back pain with radiation    Elevated blood sugar    DDD (degenerative disc disease), lumbar    Discogenic syndrome, lumbar    Acute deep vein thrombosis (DVT) of left lower extremity, unspecified vein (H)    Factor 5 Leiden mutation, heterozygous (H)    Chronic bilateral low back pain with right-sided sciatica    Pain in joint, ankle and foot, right       Current Outpatient Medications   Medication Sig Dispense Refill    meloxicam (MOBIC) 15 MG tablet Take 1 tablet (15 mg) by mouth daily 30 tablet 0    meloxicam (MOBIC) 15 MG tablet Take 1 tablet (15 mg) by mouth daily 30 tablet 0    meloxicam (MOBIC) 15 MG tablet Take 1 tablet (15 mg) by mouth daily for 30 days 30 tablet 0    methylPREDNISolone (MEDROL) 4 MG tablet therapy pack Follow Package Directions 21 tablet 0    predniSONE (DELTASONE) 20 MG tablet Take 2 tablets (40 mg)  by mouth daily 14 tablet 0       No Known Allergies    Family History   Problem Relation Age of Onset    Cerebrovascular Disease Maternal Grandfather     Heart Disease Paternal Grandfather     Cerebrovascular Disease Maternal Uncle     Breast Cancer Paternal Aunt     Colon Cancer Paternal Uncle     Diabetes No family hx of     Coronary Artery Disease No family hx of     Hypertension No family hx of     Hyperlipidemia No family hx of     Prostate Cancer No family hx of     Depression No family hx of     Anxiety Disorder No family hx of     Asthma No family hx of     Thyroid Disease No family hx of     Genetic Disorder No family hx of      Social History     Socioeconomic History    Marital status:    Tobacco Use    Smoking status: Former     Packs/day: 0.50     Types: Cigarettes     Quit date: 5/1/2013     Years since quitting: 10.3    Smokeless tobacco: Never   Vaping Use    Vaping Use: Never used   Substance and Sexual Activity    Alcohol use: Yes     Comment: 4-5 drinks per wk    Drug use: No    Sexual activity: Yes     Partners: Female     Birth control/protection: Condom, Female Surgical   Other Topics Concern    Parent/sibling w/ CABG, MI or angioplasty before 65F 55M? No     Service No    Blood Transfusions No    Caffeine Concern No    Occupational Exposure No    Hobby Hazards No    Sleep Concern Yes    Stress Concern Yes    Weight Concern Yes    Special Diet No    Back Care No    Exercise Yes     Comment: walking    Bike Helmet No    Seat Belt Yes    Self-Exams No       Additional medical/Social/Surgical histories reviewed in [facility] and updated as appropriate.     REVIEW OF SYSTEMS (9/13/2023)  10 point ROS of systems including Constitutional, Eyes, Respiratory, Cardiovascular, Gastroenterology, Genitourinary, Integumentary, Musculoskeletal, Psychiatric, Allergic/Immunologic were all negative except for pertinent positives noted in my HPI.     PHYSICAL EXAM  VSS  General  - normal appearance, in  no obvious distress  HEENT  - conjunctivae not injected, moist mucous membranes, normocephalic/atraumatic head, ears normal appearance, no lesions, mouth normal appearance, no scars, normal dentition and teeth present  CV  - normal peripheral perfusion  Pulm  - normal respiratory pattern, non-labored  Musculoskeletal - lumbar spine  - stance: normal gait without limp, no obvious leg length discrepancy, normal heel and toe walk  - inspection: normal bone and joint alignment, no obvious scoliosis  - palpation: no paravertebral or bony tenderness  - ROM: flexion exacerbates pain, normal extension, sidebending, rotation  - strength: lower extremities 5/5 in all planes  - special tests:  (+) straight leg raise  (+) slump test  Neuro  - patellar and Achilles DTRs 2+ bilaterally, some right lower extremity sensory deficit throughout L5 distribution, grossly normal coordination, normal muscle tone  Skin  - no ecchymosis, erythema, warmth, or induration, no obvious rash  Psych  - interactive, appropriate, normal mood and affect  Right foot: has ttp at medial midfoot  At medial navicular cuneiform joint with some swelling  ASSESSMENT & PLAN  59 yo male with right foot medial cuneiform/navicular arthritis, and lumbar ddd, disc herniations, radicular pain, not resolved    I independently reviewed the following imaging studies:  Lumbar MRI: shows ddd, disc herniations  Right foot MRI shows arthritis at medial navicular cuneiform joint  After a 20 minute discussion and examination, we decided to perform a same day injection for diagnostic and therapeutic purposes for  Right foot joint midfoot  Discussed cont. HEP   Follow up in a few weeks and consider repeat Rosalio at L4/5 OR L5/S1   Superfeet recommended  Rx given for celebrex and prednisone    Patient HAS completed physical therapy for 4-6 weeks  Patient has been doing home exercise physical therapy program for this problem      Appropriate PPE was utilized for prevention of  spread of Covid-19.  Parveen Rick MD, CAQSM    PROCEDURE: right foot medial navicular cuneiform joint injection     The patient was apprised of the risks and the benefits of the procedure and consented and a written consent was signed by the patient.   The patient s right foot was sterilely prepped with chloraprep.   40 mg of depo suspension was drawn up into a 5 mL syringe with 2  mL of 1% lidocaine   The patient was injected with a 1.5-inch 22-gauge needle at the medial foot at navicular cuneiform joint  There were no complications. The patient tolerated the procedure well. There was minimal bleeding.   The patient was instructed to ice the right foot upon leaving clinic and refrain from overuse over the next 3 days.   The patient was instructed to go to the emergency room with any usual pain, swelling, or redness occurred in the injected area.   The patient was given a followup appointment to evaluate response to the injection to their increased range of motion and reduction of pain.    followup PRN  Dr Parveen Rick     Medium Joint Injection/Arthrocentesis: R ankle    Date/Time: 9/13/2023 10:38 AM    Performed by: Parveen Rick MD  Authorized by: Parveen Rick MD    Indications:  Pain  Needle Size:  22 G  Guidance: surface landmarks    Approach:  Medial  Location:  Ankle  Location comment:  Medial navicular/cuneiform joint  Site:  R ankle  Medications:  40 mg methylPREDNISolone 40 MG/ML  Outcome:  Tolerated well, no immediate complications  Procedure discussed: discussed risks, benefits, and alternatives    Consent Given by:  Patient  Timeout: timeout called immediately prior to procedure    Prep: patient was prepped and draped in usual sterile fashion

## 2023-09-19 NOTE — PATIENT INSTRUCTIONS
Problem: Adult Inpatient Plan of Care  Goal: Plan of Care Review  Outcome: Ongoing, Progressing  Goal: Patient-Specific Goal (Individualized)  Outcome: Ongoing, Progressing  Goal: Absence of Hospital-Acquired Illness or Injury  Outcome: Ongoing, Progressing  Goal: Optimal Comfort and Wellbeing  Outcome: Ongoing, Progressing  Goal: Readiness for Transition of Care  Outcome: Ongoing, Progressing     Problem: Bariatric Environmental Safety  Goal: Safety Maintained with Care  Outcome: Ongoing, Progressing     Problem: Device-Related Complication Risk (Hemodialysis)  Goal: Safe, Effective Therapy Delivery  Outcome: Ongoing, Progressing     Problem: Hemodynamic Instability (Hemodialysis)  Goal: Effective Tissue Perfusion  Outcome: Ongoing, Progressing     Problem: Infection (Hemodialysis)  Goal: Absence of Infection Signs and Symptoms  Outcome: Ongoing, Progressing     Problem: Diabetes Comorbidity  Goal: Blood Glucose Level Within Targeted Range  Outcome: Ongoing, Progressing     Problem: Impaired Wound Healing  Goal: Optimal Wound Healing  Outcome: Ongoing, Progressing     Problem: Skin Injury Risk Increased  Goal: Skin Health and Integrity  Outcome: Ongoing, Progressing     Problem: Anemia  Goal: Anemia Symptom Improvement  Outcome: Ongoing, Progressing     Problem: Behavioral Health Comorbidity  Goal: Maintenance of Behavioral Health Symptom Control  Outcome: Ongoing, Progressing     Problem: Heart Failure Comorbidity  Goal: Maintenance of Heart Failure Symptom Control  Outcome: Ongoing, Progressing     Problem: Hypertension Comorbidity  Goal: Blood Pressure in Desired Range  Outcome: Ongoing, Progressing     Problem: Obstructive Sleep Apnea Risk or Actual Comorbidity Management  Goal: Unobstructed Breathing During Sleep  Outcome: Ongoing, Progressing     Problem: Pain Chronic (Persistent) (Comorbidity Management)  Goal: Acceptable Pain Control and Functional Ability  Outcome: Ongoing, Progressing     Problem: Fall  At [facility], we strive to deliver an exceptional experience to you, every time we see you. If you receive a survey, please complete it as we do value your feedback.  If you have MyChart, you can expect to receive results automatically within 24 hours of their completion.  Your provider will send a note interpreting your results as well.   If you do not have MyChart, you should receive your results in about a week by mail.    Your care team:                            Family Medicine Internal Medicine   MD Santos Hernandez MD Shantel Branch-Fleming, MD Srinivasa Vaka, MD Katya Belousova, PAKWADWO Redding CNP, MD ([facility]   Jermaine Knapp, MD Karlie Tillman MD Amelia Massimini APRN ANY Eason APRN MD Jaqueline Hatfield MD          Clinic hours: Monday - Thursday 7 am-6 pm; Fridays 7 am-5 pm.   Urgent care: Monday - Friday 10 am- 8 pm; Saturday and Sunday 9 am-5 pm.    Clinic: [phone]       [facility]: Monday - Thursday 8 am - 7 pm; Friday 8 am - 6 pm  [facility]: [phone]      Injury Risk  Goal: Absence of Fall and Fall-Related Injury  Outcome: Ongoing, Progressing   Pt admitted and care plan initiated.Tele maintained,NSR.scheduled for dialysis this am.wound care nurse consulted.pt arrived with multiple wounds.safety precautions implemented.bed in low position.rails up x3.call bell in reach.bed alarm activated for pt safety.continue plan of care.

## 2023-09-26 DIAGNOSIS — M51.369 DDD (DEGENERATIVE DISC DISEASE), LUMBAR: ICD-10-CM

## 2023-09-26 DIAGNOSIS — M51.16 LUMBAR DISC HERNIATION WITH RADICULOPATHY: Primary | ICD-10-CM

## 2023-09-26 NOTE — TELEPHONE ENCOUNTER
Prescription refill requested for:   Meloxicam 15 MG tablets      Last Written Prescription Date:  9/1/23  Last Fill Quantity: 30,   # refills: 0  Last Office Visit: 9/13/23  Future Office visit:           Demarco Morrison EMT

## 2023-09-28 RX ORDER — MELOXICAM 15 MG/1
15 TABLET ORAL DAILY
Qty: 30 TABLET | Refills: 0 | Status: SHIPPED | OUTPATIENT
Start: 2023-09-28 | End: 2024-05-02

## 2023-10-04 ENCOUNTER — VIRTUAL VISIT (OUTPATIENT)
Dept: ORTHOPEDICS | Facility: CLINIC | Age: 58
End: 2023-10-04
Payer: COMMERCIAL

## 2023-10-04 DIAGNOSIS — M51.16 LUMBAR DISC HERNIATION WITH RADICULOPATHY: ICD-10-CM

## 2023-10-04 DIAGNOSIS — M19.071 ARTHRITIS OF RIGHT ANKLE: ICD-10-CM

## 2023-10-04 DIAGNOSIS — M19.079 ARTHRITIS OF MIDFOOT: ICD-10-CM

## 2023-10-04 DIAGNOSIS — M51.369 DDD (DEGENERATIVE DISC DISEASE), LUMBAR: Primary | ICD-10-CM

## 2023-10-04 PROCEDURE — 99442 PR PHYSICIAN TELEPHONE EVALUATION 11-20 MIN: CPT | Performed by: PREVENTIVE MEDICINE

## 2023-10-04 NOTE — PROGRESS NOTES
Patient is a   58  year old who is being evaluated via a billable telephone visit.      What phone number would you like to be contacted at? CELL  How would you like to obtain your AVS? CHICHI        Subjective   Patient is a   58  year old who presents by phone call visit for the following:     HPI   followup for right foot injection  Overall doing better  Still has some radicular symptoms but stable now    Review of Systems   Constitutional, HEENT, cardiovascular, pulmonary, gi and gu systems are negative, except as otherwise noted.      Objective           Vitals:  No vitals were obtained today due to virtual visit.    Physical Exam   healthy, alert, and no distress  PSYCH: Alert and oriented times 3; coherent speech, normal   rate and volume, able to articulate logical thoughts, able   to abstract reason, no tangential thoughts, no hallucinations   or delusions  His affect is normal  RESP: No cough, no audible wheezing, able to talk in full sentences  Remainder of exam unable to be completed due to telephone visits    Assessment/Plan  59 yo male with right foot arthritis, improved, and lumbar ddd, disc herniations, radicular pain, not resolved, but stable    I independently reviewed the following imaging studies and discussed with patient:  Lumbar MRI; shows ddd, disc herniations  Discussed cont. HEP and medications  followup in 2-3 months if still bothering him  Can consider repeat injections          Phone call duration: 20 minutes  Phone call start: 1050am  Phone call end: 1110am  Dr Rick

## 2023-10-04 NOTE — LETTER
10/4/2023         RE: Nando Wolfe  [address]  [place] [address]        Dear Colleague,    Thank you for referring your patient, Nando Wolfe, to the [facility] [place]. Please see a copy of my visit note below.    Patient is a   58  year old who is being evaluated via a billable telephone visit.      What phone number would you like to be contacted at? CELL  How would you like to obtain your AVS? MYCHART        Subjective   Patient is a   58  year old who presents by phone call visit for the following:     HPI   followup for right foot injection  Overall doing better  Still has some radicular symptoms but stable now    Review of Systems   Constitutional, HEENT, cardiovascular, pulmonary, gi and gu systems are negative, except as otherwise noted.      Objective           Vitals:  No vitals were obtained today due to virtual visit.    Physical Exam   healthy, alert, and no distress  PSYCH: Alert and oriented times 3; coherent speech, normal   rate and volume, able to articulate logical thoughts, able   to abstract reason, no tangential thoughts, no hallucinations   or delusions  His affect is normal  RESP: No cough, no audible wheezing, able to talk in full sentences  Remainder of exam unable to be completed due to telephone visits    Assessment/Plan  59 yo male with right foot arthritis, improved, and lumbar ddd, disc herniations, radicular pain, not resolved, but stable    I independently reviewed the following imaging studies and discussed with patient:  Lumbar MRI; shows ddd, disc herniations  Discussed cont. HEP and medications  followup in 2-3 months if still bothering him  Can consider repeat injections          Phone call duration: 20 minutes  Phone call start: 1050am  Phone call end: 1110am  Dr Rick      Again, thank you for allowing me to participate in the care of your patient.        Sincerely,        Parveen Rick MD

## 2023-10-04 NOTE — LETTER
10/4/2023         RE: Nando Wolfe  [address]  [place] [address]        Dear Colleague,    Thank you for referring your patient, Nando Wolfe, to the [facility] [place]. Please see a copy of my visit note below.    Patient is a   58  year old who is being evaluated via a billable telephone visit.      What phone number would you like to be contacted at? CELL  How would you like to obtain your AVS? MYCHART        Subjective   Patient is a   58  year old who presents by phone call visit for the following:     HPI   followup for right foot injection  Overall doing better  Still has some radicular symptoms but stable now    Review of Systems   Constitutional, HEENT, cardiovascular, pulmonary, gi and gu systems are negative, except as otherwise noted.      Objective           Vitals:  No vitals were obtained today due to virtual visit.    Physical Exam   healthy, alert, and no distress  PSYCH: Alert and oriented times 3; coherent speech, normal   rate and volume, able to articulate logical thoughts, able   to abstract reason, no tangential thoughts, no hallucinations   or delusions  His affect is normal  RESP: No cough, no audible wheezing, able to talk in full sentences  Remainder of exam unable to be completed due to telephone visits    Assessment/Plan  57 yo male with right foot arthritis, improved, and lumbar ddd, disc herniations, radicular pain, not resolved, but stable    I independently reviewed the following imaging studies and discussed with patient:  Lumbar MRI; shows ddd, disc herniations  Discussed cont. HEP and medications  followup in 2-3 months if still bothering him  Can consider repeat injections          Phone call duration: 20 minutes  Phone call start: 1050am  Phone call end: 1110am  Dr Rick      Again, thank you for allowing me to participate in the care of your patient.        Sincerely,        Parveen Rick MD

## 2023-11-01 ENCOUNTER — OFFICE VISIT (OUTPATIENT)
Dept: ORTHOPEDICS | Facility: CLINIC | Age: 58
End: 2023-11-01
Payer: COMMERCIAL

## 2023-11-01 DIAGNOSIS — M51.369 DDD (DEGENERATIVE DISC DISEASE), LUMBAR: Primary | ICD-10-CM

## 2023-11-01 DIAGNOSIS — M51.16 LUMBAR DISC HERNIATION WITH RADICULOPATHY: ICD-10-CM

## 2023-11-01 PROCEDURE — 99214 OFFICE O/P EST MOD 30 MIN: CPT | Performed by: PREVENTIVE MEDICINE

## 2023-11-01 RX ORDER — CELECOXIB 100 MG/1
100 CAPSULE ORAL 2 TIMES DAILY PRN
Qty: 60 CAPSULE | Refills: 1 | Status: SHIPPED | OUTPATIENT
Start: 2023-11-01 | End: 2024-05-02

## 2023-11-01 NOTE — PROGRESS NOTES
HISTORY OF PRESENT ILLNESS  Mr. Wolfe is a pleasant 58 year old year old male who presents to clinic today with the following:  What problem are you here for? Right foot and ankle follow up   Still having pain, radiating into leg and some radicular symptoms persistent  How long have you had this problem? 6 months     Have you had any recent imaging of this problem? Xrays/MRI/CT scans? Yes     Have you had treatments for this problem in the past?  -Medications? yes  -Physical therapy? yes  -Injections? yes  -Surgery? no    How severe is this problem today? 0-10 scale? 1-2    How severe has this problem been at WORST in the past? 0-10 scale? 5     What do you think caused this problem? Na     Does this problem or its symptoms cause difficulty for you falling asleep or staying asleep? no    Anything else you want us to know about this problem? no          MEDICAL HISTORY  Patient Active Problem List   Diagnosis    CARDIOVASCULAR SCREENING; LDL GOAL LESS THAN 160    Obese    Back pain with radiation    Elevated blood sugar    DDD (degenerative disc disease), lumbar    Discogenic syndrome, lumbar    Acute deep vein thrombosis (DVT) of left lower extremity, unspecified vein (H)    Factor 5 Leiden mutation, heterozygous (H24)    Chronic bilateral low back pain with right-sided sciatica    Pain in joint, ankle and foot, right       Current Outpatient Medications   Medication Sig Dispense Refill    celecoxib (CELEBREX) 100 MG capsule Take 1 capsule (100 mg) by mouth 2 times daily 60 capsule 0    meloxicam (MOBIC) 15 MG tablet Take 1 tablet (15 mg) by mouth daily 30 tablet 0    meloxicam (MOBIC) 15 MG tablet Take 1 tablet (15 mg) by mouth daily 30 tablet 0    meloxicam (MOBIC) 15 MG tablet Take 1 tablet (15 mg) by mouth daily 30 tablet 0    meloxicam (MOBIC) 15 MG tablet Take 1 tablet (15 mg) by mouth daily for 30 days 30 tablet 0    methylPREDNISolone (MEDROL) 4 MG tablet therapy pack Follow Package Directions 21 tablet 0     predniSONE (DELTASONE) 20 MG tablet Take 2 tablets (40 mg) by mouth daily 14 tablet 0    predniSONE (DELTASONE) 20 MG tablet Take 2 tablets (40 mg) by mouth daily 14 tablet 0       No Known Allergies    Family History   Problem Relation Age of Onset    Cerebrovascular Disease Maternal Grandfather     Heart Disease Paternal Grandfather     Cerebrovascular Disease Maternal Uncle     Breast Cancer Paternal Aunt     Colon Cancer Paternal Uncle     Diabetes No family hx of     Coronary Artery Disease No family hx of     Hypertension No family hx of     Hyperlipidemia No family hx of     Prostate Cancer No family hx of     Depression No family hx of     Anxiety Disorder No family hx of     Asthma No family hx of     Thyroid Disease No family hx of     Genetic Disorder No family hx of      Social History     Socioeconomic History    Marital status:    Tobacco Use    Smoking status: Former     Packs/day: .5     Types: Cigarettes     Quit date: 5/1/2013     Years since quitting: 10.5    Smokeless tobacco: Never   Vaping Use    Vaping Use: Never used   Substance and Sexual Activity    Alcohol use: Yes     Comment: 4-5 drinks per wk    Drug use: No    Sexual activity: Yes     Partners: Female     Birth control/protection: Condom, Female Surgical   Other Topics Concern    Parent/sibling w/ CABG, MI or angioplasty before 65F 55M? No     Service No    Blood Transfusions No    Caffeine Concern No    Occupational Exposure No    Hobby Hazards No    Sleep Concern Yes    Stress Concern Yes    Weight Concern Yes    Special Diet No    Back Care No    Exercise Yes     Comment: walking    Bike Helmet No    Seat Belt Yes    Self-Exams No       Additional medical/Social/Surgical histories reviewed in [facility] and updated as appropriate.     REVIEW OF SYSTEMS (11/1/2023)  10 point ROS of systems including Constitutional, Eyes, Respiratory, Cardiovascular, Gastroenterology, Genitourinary, Integumentary, Musculoskeletal,  Psychiatric, Allergic/Immunologic were all negative except for pertinent positives noted in my HPI.     PHYSICAL EXAM  VSS  Vital Signs: There were no vitals taken for this visit. Patient declined being weighed. There is no height or weight on file to calculate BMI.    General  - normal appearance, in no obvious distress  HEENT  - conjunctivae not injected, moist mucous membranes, normocephalic/atraumatic head, ears normal appearance, no lesions, mouth normal appearance, no scars, normal dentition and teeth present  CV  - normal peripheral perfusion  Pulm  - normal respiratory pattern, non-labored  Musculoskeletal - lumbar spine  - stance: normal gait without limp, no obvious leg length discrepancy, normal heel and toe walk  - inspection: normal bone and joint alignment, no obvious scoliosis  - palpation: no paravertebral or bony tenderness  - ROM: flexion exacerbates some pain, normal extension, sidebending, rotation  - strength: lower extremities 5/5 in all planes  - special tests:  (+) straight leg raise some on right  (+) slump test  Neuro  - patellar and Achilles DTRs 2+ bilaterally, lower extremity sensory deficit throughout L5 distribution, grossly normal coordination, normal muscle tone  Skin  - no ecchymosis, erythema, warmth, or induration, no obvious rash  Psych  - interactive, appropriate, normal mood and affect  Right foot/ankle: full ROM, no swelling, no instability on exam  ASSESSMENT & PLAN  57 yo male with chronic right foot and ankle pain due to lumbar radiculopathy, lumbar ddd, disc herniations, not resolved    I independently reviewed the following imaging studies:  Lumbar MRI: shows ddd, disc herniations  Discussed and ordered lumbar KUNAL  Cont. HEP and PT exercises  Followup after KUNAL  Rx given for celebrex  Patient HAS completed physical therapy for 4-6 weeks  Patient has been doing home exercise physical therapy program for this problem      Appropriate PPE was utilized for prevention of  spread of Covid-19.  Parveen Rick MD, CAQSM

## 2023-11-01 NOTE — LETTER
11/1/2023         RE: Nando Wolfe  [address]  [facility] [address]        Dear Colleague,    Thank you for referring your patient, Nando Wolfe, to the [facility] [place]. Please see a copy of my visit note below.    HISTORY OF PRESENT ILLNESS  Mr. Wolfe is a pleasant 58 year old year old male who presents to clinic today with the following:  What problem are you here for? Right foot and ankle follow up   Still having pain, radiating into leg and some radicular symptoms persistent  How long have you had this problem? 6 months     Have you had any recent imaging of this problem? Xrays/MRI/CT scans? Yes     Have you had treatments for this problem in the past?  -Medications? yes  -Physical therapy? yes  -Injections? yes  -Surgery? no    How severe is this problem today? 0-10 scale? 1-2    How severe has this problem been at WORST in the past? 0-10 scale? 5     What do you think caused this problem? Na     Does this problem or its symptoms cause difficulty for you falling asleep or staying asleep? no    Anything else you want us to know about this problem? no          MEDICAL HISTORY  Patient Active Problem List   Diagnosis     CARDIOVASCULAR SCREENING; LDL GOAL LESS THAN 160     Obese     Back pain with radiation     Elevated blood sugar     DDD (degenerative disc disease), lumbar     Discogenic syndrome, lumbar     Acute deep vein thrombosis (DVT) of left lower extremity, unspecified vein (H)     Factor 5 Leiden mutation, heterozygous (H24)     Chronic bilateral low back pain with right-sided sciatica     Pain in joint, ankle and foot, right       Current Outpatient Medications   Medication Sig Dispense Refill     celecoxib (CELEBREX) 100 MG capsule Take 1 capsule (100 mg) by mouth 2 times daily 60 capsule 0     meloxicam (MOBIC) 15 MG tablet Take 1 tablet (15 mg) by mouth daily 30 tablet 0     meloxicam (MOBIC) 15 MG tablet Take 1 tablet (15 mg) by mouth  daily 30 tablet 0     meloxicam (MOBIC) 15 MG tablet Take 1 tablet (15 mg) by mouth daily 30 tablet 0     meloxicam (MOBIC) 15 MG tablet Take 1 tablet (15 mg) by mouth daily for 30 days 30 tablet 0     methylPREDNISolone (MEDROL) 4 MG tablet therapy pack Follow Package Directions 21 tablet 0     predniSONE (DELTASONE) 20 MG tablet Take 2 tablets (40 mg) by mouth daily 14 tablet 0     predniSONE (DELTASONE) 20 MG tablet Take 2 tablets (40 mg) by mouth daily 14 tablet 0       No Known Allergies    Family History   Problem Relation Age of Onset     Cerebrovascular Disease Maternal Grandfather      Heart Disease Paternal Grandfather      Cerebrovascular Disease Maternal Uncle      Breast Cancer Paternal Aunt      Colon Cancer Paternal Uncle      Diabetes No family hx of      Coronary Artery Disease No family hx of      Hypertension No family hx of      Hyperlipidemia No family hx of      Prostate Cancer No family hx of      Depression No family hx of      Anxiety Disorder No family hx of      Asthma No family hx of      Thyroid Disease No family hx of      Genetic Disorder No family hx of      Social History     Socioeconomic History     Marital status:    Tobacco Use     Smoking status: Former     Packs/day: .5     Types: Cigarettes     Quit date: 5/1/2013     Years since quitting: 10.5     Smokeless tobacco: Never   Vaping Use     Vaping Use: Never used   Substance and Sexual Activity     Alcohol use: Yes     Comment: 4-5 drinks per wk     Drug use: No     Sexual activity: Yes     Partners: Female     Birth control/protection: Condom, Female Surgical   Other Topics Concern     Parent/sibling w/ CABG, MI or angioplasty before 65F 55M? No      Service No     Blood Transfusions No     Caffeine Concern No     Occupational Exposure No     Hobby Hazards No     Sleep Concern Yes     Stress Concern Yes     Weight Concern Yes     Special Diet No     Back Care No     Exercise Yes     Comment: walking     Bike  Helmet No     Seat Belt Yes     Self-Exams No       Additional medical/Social/Surgical histories reviewed in [facility] and updated as appropriate.     REVIEW OF SYSTEMS (11/1/2023)  10 point ROS of systems including Constitutional, Eyes, Respiratory, Cardiovascular, Gastroenterology, Genitourinary, Integumentary, Musculoskeletal, Psychiatric, Allergic/Immunologic were all negative except for pertinent positives noted in my HPI.     PHYSICAL EXAM  VSS  Vital Signs: There were no vitals taken for this visit. Patient declined being weighed. There is no height or weight on file to calculate BMI.    General  - normal appearance, in no obvious distress  HEENT  - conjunctivae not injected, moist mucous membranes, normocephalic/atraumatic head, ears normal appearance, no lesions, mouth normal appearance, no scars, normal dentition and teeth present  CV  - normal peripheral perfusion  Pulm  - normal respiratory pattern, non-labored  Musculoskeletal - lumbar spine  - stance: normal gait without limp, no obvious leg length discrepancy, normal heel and toe walk  - inspection: normal bone and joint alignment, no obvious scoliosis  - palpation: no paravertebral or bony tenderness  - ROM: flexion exacerbates some pain, normal extension, sidebending, rotation  - strength: lower extremities 5/5 in all planes  - special tests:  (+) straight leg raise some on right  (+) slump test  Neuro  - patellar and Achilles DTRs 2+ bilaterally, lower extremity sensory deficit throughout L5 distribution, grossly normal coordination, normal muscle tone  Skin  - no ecchymosis, erythema, warmth, or induration, no obvious rash  Psych  - interactive, appropriate, normal mood and affect  Right foot/ankle: full ROM, no swelling, no instability on exam  ASSESSMENT & PLAN  57 yo male with chronic right foot and ankle pain due to lumbar radiculopathy, lumbar ddd, disc herniations, not resolved    I independently reviewed the following imaging studies:  Lumbar  MRI: shows ddd, disc herniations  Discussed and ordered lumbar KUNAL  Cont. HEP and PT exercises  Followup after KUNAL  Rx given for celebrex  Patient HAS completed physical therapy for 4-6 weeks  Patient has been doing home exercise physical therapy program for this problem      Appropriate PPE was utilized for prevention of spread of Covid-19.  Parveen Rick MD, CAQSM              Again, thank you for allowing me to participate in the care of your patient.        Sincerely,        Parveen Rick MD

## 2023-11-03 ENCOUNTER — PATIENT OUTREACH (OUTPATIENT)
Dept: CARE COORDINATION | Facility: CLINIC | Age: 58
End: 2023-11-03
Payer: COMMERCIAL

## 2023-11-09 NOTE — PROGRESS NOTES
09/06/23 0500   Appointment Info   Signing clinician's name / credentials Blayne Wilcox DPT   Total/Authorized Visits 8 [employer]   Visits Used 5   Medical Diagnosis Lumbar disc herniation with radiculopathy  DDD (degenerative disc disease), lumbar  Tendonitis of ankle   PT Tx Diagnosis low back / right ankle / foot pain   Other pertinent information DVT ruled out x2   Progress Note/Certification   Onset of illness/injury or Date of Surgery 05/16/23   Therapy Frequency 1 x/ week   Predicted Duration 8 weeks   Progress Note Due Date 09/28/23   Progress Note Completed Date 08/03/23       Present No   GOALS   PT Goals 2   PT Goal 1   Goal Identifier ambulation   Goal Description pt will be able to ambulate for 60 min with no pain and no swelling in foot after   Rationale to maximize safety and independence with performance of ADLs and functional tasks   Goal Progress ambulating 60 min 2/10 PL,  figure 8 swelling 59 cm compared to 57cm,   Target Date 09/28/23   Subjective Report   Subjective Report pt reports seems like his foot is less swollen and the tingling on top of his foot is not as swollen. still feels like putting weight on his ankle is what really aggravates it. biking for exercise or long walks. since injection not much better then last week. what seems to irritate his foot the most is pulling his foot down into PF.   Objective Measures   Objective Measures Objective Measure 1;Objective Measure 2;Objective Measure 3;Objective Measure 4   Objective Measure 1   Objective Measure AROM   Details DF 8 compared to 8, PF 45 compared to 45, inversion 24 compared to 32, eversion 18 compared to 18.   Objective Measure 2   Objective Measure swelling figure 8   Details 58cm compared to 57 cm.   Objective Measure 3   Objective Measure ankle strength   Details 5/5 no signficant pain, maybe slight with eversion., PF 4+/5 and does feel top of foot pain.   Objective Measure 4   Objective Measure pain with  palpation over medial malleolus.   PT Modalities   PT Modalities Ultrasound   Ultrasound   Ultrasound -Type (does not include 3-5 min prep/cleanup time) Continuous;2 cm sound head   Ultrasound Minutes (46056) 8   Patient Response/Progress pt tolerated well.   Intensity 2.5   Duration (does not include the 3-5 min set up/clean up time) 8 min   Frequency 1 MHz   Location dorsal foot   Positioning supine   Treatment Interventions (PT)   Interventions Therapeutic Procedure/Exercise;Manual Therapy   Therapeutic Procedure/Exercise   Therapeutic Procedures: strength, endurance, ROM, flexibillity minutes (66714) 15   PTRx Ther Proc 7 Nerve Mobility Slump   PTRx Ther Proc 7 - Details does not mimic his pain feel more in the calf vs the front of the ankle. 10 x 5 sec.    PTRx Ther Proc 8 Flexion in Sitting with Patient Overpressure/Progression to Knee Extension   PTRx Ther Proc 8 - Details thought it possibly helped.    PTRx Ther Proc 9 Lumbar Flexion in Standing with/without Patient Overpressure   PTRx Ther Proc 9 - Details think possibly helped.    Skilled Intervention continue with ankle strengthening trial ankle brace for foot and ankle support. return to pressup as flexion seemed to possibly help but unsure   Patient Response/Progress overall slightly less swelling,   PTRx Ther Proc 10 Prone On Elbows   PTRx Ther Proc 10 - Details x 10    PTRx Ther Proc 11 Prone Press Ups   PTRx Ther Proc 11 - Details x 10    Manual Therapy   Manual Therapy: Mobilization, MFR, MLD, friction massage minutes (46257) 10   Manual Therapy Manual Therapy 2   Manual Therapy 1 ankle mobilization mid foot and talocrual grade III for mobility   Manual Therapy 1 - Details x 10 min   Manual Therapy 2 STM peroneal tertius, anterior tibialis and plantar fasica and efflurage for sweeling   Manual Therapy 2 - Details x 5 min   Skilled Intervention jignesh / foot mobilzation for ROm and pain relief   Patient Response/Progress ankle ROM slowly improving no  change on swelling   Education   Learner/Method Patient;Demonstration;Pictures/Video;No Barriers to Learning   Plan   Home program trail of extension, ankle brace, continue ankle strengthening   Plan for next session repeat US trial low back stabilization. see if ext made substantial changes.   Total Session Time   Timed Code Treatment Minutes 33   Total Treatment Time (sum of timed and untimed services) 33       DISCHARGE  Reason for Discharge: Patient chooses to discontinue therapy.    Equipment Issued:     Discharge Plan: Patient to continue home program.    Referring Provider:  Parveen Rick

## 2023-11-17 ENCOUNTER — PATIENT OUTREACH (OUTPATIENT)
Dept: CARE COORDINATION | Facility: CLINIC | Age: 58
End: 2023-11-17
Payer: COMMERCIAL

## 2023-11-24 ENCOUNTER — OFFICE VISIT (OUTPATIENT)
Dept: PODIATRY | Facility: CLINIC | Age: 58
End: 2023-11-24
Payer: COMMERCIAL

## 2023-11-24 ENCOUNTER — MEDICAL CORRESPONDENCE (OUTPATIENT)
Dept: HEALTH INFORMATION MANAGEMENT | Facility: CLINIC | Age: 58
End: 2023-11-24

## 2023-11-24 DIAGNOSIS — M25.571 RIGHT ANKLE PAIN, UNSPECIFIED CHRONICITY: Primary | ICD-10-CM

## 2023-11-24 DIAGNOSIS — M76.821 POSTERIOR TIBIAL TENDINITIS OF RIGHT LOWER EXTREMITY: ICD-10-CM

## 2023-11-24 PROCEDURE — 99203 OFFICE O/P NEW LOW 30 MIN: CPT | Performed by: PODIATRIST

## 2023-11-24 NOTE — LETTER
11/24/2023         RE: Nando Wolfe  [address]  [place] [address]        Dear Colleague,    Thank you for referring your patient, Nando Wolfe, to the [facility]. Please see a copy of my visit note below.    Past Medical History:   Diagnosis Date     DDD (degenerative disc disease), lumbar      Patient Active Problem List   Diagnosis     CARDIOVASCULAR SCREENING; LDL GOAL LESS THAN 160     Obese     Back pain with radiation     Elevated blood sugar     DDD (degenerative disc disease), lumbar     Discogenic syndrome, lumbar     Acute deep vein thrombosis (DVT) of left lower extremity, unspecified vein (H)     Factor 5 Leiden mutation, heterozygous (H24)     Chronic bilateral low back pain with right-sided sciatica     Pain in joint, ankle and foot, right     Past Surgical History:   Procedure Laterality Date     COLONOSCOPY WITH CO2 INSUFFLATION N/A 2/9/2018    Procedure: COLONOSCOPY WITH CO2 INSUFFLATION;  Shania Sosa/Colonoscopy/Screening/[facility] Fax number: [phone];  Surgeon: Hussein Jessica MD;  Location: [facility] OR     HERNIA REPAIR       TONSILLECTOMY       Social History     Socioeconomic History     Marital status:      Spouse name: Not on file     Number of children: Not on file     Years of education: Not on file     Highest education level: Not on file   Occupational History     Not on file   Tobacco Use     Smoking status: Former     Packs/day: .5     Types: Cigarettes     Quit date: 5/1/2013     Years since quitting: 10.5     Smokeless tobacco: Never   Vaping Use     Vaping Use: Never used   Substance and Sexual Activity     Alcohol use: Yes     Comment: 4-5 drinks per wk     Drug use: No     Sexual activity: Yes     Partners: Female     Birth control/protection: Condom, Female Surgical   Other Topics Concern     Parent/sibling w/ CABG, MI or angioplasty before 65F 55M? No      Service No     Blood Transfusions No      Caffeine Concern No     Occupational Exposure No     Hobby Hazards No     Sleep Concern Yes     Stress Concern Yes     Weight Concern Yes     Special Diet No     Back Care No     Exercise Yes     Comment: walking     Bike Helmet No     Seat Belt Yes     Self-Exams No   Social History Narrative     Not on file     Social Determinants of Health     Financial Resource Strain: Not on file   Food Insecurity: Not on file   Transportation Needs: Not on file   Physical Activity: Not on file   Stress: Not on file   Social Connections: Not on file   Interpersonal Safety: Not on file   Housing Stability: Not on file     Family History   Problem Relation Age of Onset     Cerebrovascular Disease Maternal Grandfather      Heart Disease Paternal Grandfather      Cerebrovascular Disease Maternal Uncle      Breast Cancer Paternal Aunt      Colon Cancer Paternal Uncle      Diabetes No family hx of      Coronary Artery Disease No family hx of      Hypertension No family hx of      Hyperlipidemia No family hx of      Prostate Cancer No family hx of      Depression No family hx of      Anxiety Disorder No family hx of      Asthma No family hx of      Thyroid Disease No family hx of      Genetic Disorder No family hx of            Subjective findings- 58-year-old presents for right foot medial ankle pain.  Relates he gets swelling in his foot and ankle he is still has some pain in the right medial malleolus when he pushes on it otherwise it does not hurt, relates to getting a cortisone shot that helped some, relates getting Superfeet orthotics have helped, relates he is also being treated for degenerative joint disease in his back, wears compression socks, has done physical therapy but is not doing that currently.    Objective findings- DP and PT are 2 out of 4 right.  Has peripheral edema of the right foot and ankle.  There is no erythema, no drainage, no odor, no calor, no gross tendon voids.  Has pain on palpation of the medial   malleolus and the tibialis posterior tendon at the medial malleolus.  5/30/2023 right foot and leg x-rays and 7/6/2023 MRI reviewed reports and films.  Has ossicles at the navicular tuberosity on x-ray.    Assessment and plan- Right medial ankle pain, Tibialis posterior tendinopathy right.  Peripheral edema and back disease present.  Diagnosis and treatment options discussed with the patient.  Advised him to continue exercising he relates he is using a stationary bike.  Patient is casted for custom foot orthotics he is given the phone number address to orthotics and prosthetics lab to pick these up.  Return to clinic and see me as needed.  I reviewed Dr. Lopez previous notes.            Low level of medical decision making.      Again, thank you for allowing me to participate in the care of your patient.        Sincerely,        Armin Quispe DPM

## 2023-11-24 NOTE — PROGRESS NOTES
Past Medical History:   Diagnosis Date    DDD (degenerative disc disease), lumbar      Patient Active Problem List   Diagnosis    CARDIOVASCULAR SCREENING; LDL GOAL LESS THAN 160    Obese    Back pain with radiation    Elevated blood sugar    DDD (degenerative disc disease), lumbar    Discogenic syndrome, lumbar    Acute deep vein thrombosis (DVT) of left lower extremity, unspecified vein (H)    Factor 5 Leiden mutation, heterozygous (H24)    Chronic bilateral low back pain with right-sided sciatica    Pain in joint, ankle and foot, right     Past Surgical History:   Procedure Laterality Date    COLONOSCOPY WITH CO2 INSUFFLATION N/A 2/9/2018    Procedure: COLONOSCOPY WITH CO2 INSUFFLATION;  Shania Sosa/Colonoscopy/Screening/[facility] Fax number: [phone];  Surgeon: Hussein Jessica MD;  Location: [facility] OR    HERNIA REPAIR      TONSILLECTOMY       Social History     Socioeconomic History    Marital status:      Spouse name: Not on file    Number of children: Not on file    Years of education: Not on file    Highest education level: Not on file   Occupational History    Not on file   Tobacco Use    Smoking status: Former     Packs/day: .5     Types: Cigarettes     Quit date: 5/1/2013     Years since quitting: 10.5    Smokeless tobacco: Never   Vaping Use    Vaping Use: Never used   Substance and Sexual Activity    Alcohol use: Yes     Comment: 4-5 drinks per wk    Drug use: No    Sexual activity: Yes     Partners: Female     Birth control/protection: Condom, Female Surgical   Other Topics Concern    Parent/sibling w/ CABG, MI or angioplasty before 65F 55M? No     Service No    Blood Transfusions No    Caffeine Concern No    Occupational Exposure No    Hobby Hazards No    Sleep Concern Yes    Stress Concern Yes    Weight Concern Yes    Special Diet No    Back Care No    Exercise Yes     Comment: walking    Bike Helmet No    Seat Belt Yes    Self-Exams No   Social History Narrative    Not on  file     Social Determinants of Health     Financial Resource Strain: Not on file   Food Insecurity: Not on file   Transportation Needs: Not on file   Physical Activity: Not on file   Stress: Not on file   Social Connections: Not on file   Interpersonal Safety: Not on file   Housing Stability: Not on file     Family History   Problem Relation Age of Onset    Cerebrovascular Disease Maternal Grandfather     Heart Disease Paternal Grandfather     Cerebrovascular Disease Maternal Uncle     Breast Cancer Paternal Aunt     Colon Cancer Paternal Uncle     Diabetes No family hx of     Coronary Artery Disease No family hx of     Hypertension No family hx of     Hyperlipidemia No family hx of     Prostate Cancer No family hx of     Depression No family hx of     Anxiety Disorder No family hx of     Asthma No family hx of     Thyroid Disease No family hx of     Genetic Disorder No family hx of            Subjective findings- 58-year-old presents for right foot medial ankle pain.  Relates he gets swelling in his foot and ankle he is still has some pain in the right medial malleolus when he pushes on it otherwise it does not hurt, relates to getting a cortisone shot that helped some, relates getting [employer]feet orthotics have helped, relates he is also being treated for degenerative joint disease in his back, wears compression socks, has done physical therapy but is not doing that currently.    Objective findings- DP and PT are 2 out of 4 right.  Has peripheral edema of the right foot and ankle.  There is no erythema, no drainage, no odor, no calor, no gross tendon voids.  Has pain on palpation of the medial  malleolus and the tibialis posterior tendon at the medial malleolus.  5/30/2023 right foot and leg x-rays and 7/6/2023 MRI reviewed reports and films.  Has ossicles at the navicular tuberosity on x-ray.    Assessment and plan- Right medial ankle pain, Tibialis posterior tendinopathy right.  Peripheral edema and back disease  present.  Diagnosis and treatment options discussed with the patient.  Advised him to continue exercising he relates he is using a stationary bike.  Patient is casted for custom foot orthotics he is given the phone number address to orthotics and prosthetics lab to pick these up.  Return to clinic and see me as needed.  I reviewed Dr. Lopez previous notes.            Low level of medical decision making.

## 2024-02-17 ENCOUNTER — HEALTH MAINTENANCE LETTER (OUTPATIENT)
Age: 59
End: 2024-02-17

## 2024-02-23 NOTE — PROGRESS NOTES
HISTORY OF PRESENT ILLNESS  Mr. Wolfe is a pleasant 58 year old year old male who presents to clinic today with the following:  What problem are you here for? Right foot and ankle follow up - worse at the end of the day. Swollen still and has problems with getting shoes on.     How long have you had this problem? 9 months     Have you had any recent imaging of this problem? Xrays/MRI/CT scans? XR right foot 5/30/23; XR right tib/fib 5/30/23; XR lumbar 6/26/23; Lumbar MRI 7/6/23; Right ankle MRI 7/6/23    Have you had treatments for this problem in the past?  -Medications? Yes- Celebrex, prednisone, medrol dose pack (didn't help)  -Physical therapy? Yes - 4-6 weeks  -Injections? Yes- Lumbar KUNAL  -Surgery? No    How severe is this problem today? 0-10 scale? 2    How severe has this problem been at WORST in the past? 0-10 scale? 3    What do you think caused this problem? Unsure    Does this problem or its symptoms cause difficulty for you falling asleep or staying asleep? No    Anything else you want us to know about this problem? No          MEDICAL HISTORY  Patient Active Problem List   Diagnosis    CARDIOVASCULAR SCREENING; LDL GOAL LESS THAN 160    Obese    Back pain with radiation    Elevated blood sugar    DDD (degenerative disc disease), lumbar    Discogenic syndrome, lumbar    Acute deep vein thrombosis (DVT) of left lower extremity, unspecified vein (H)    Factor 5 Leiden mutation, heterozygous (H24)    Chronic bilateral low back pain with right-sided sciatica    Pain in joint, ankle and foot, right       Current Outpatient Medications   Medication Sig Dispense Refill    celecoxib (CELEBREX) 100 MG capsule Take 1 capsule (100 mg) by mouth 2 times daily as needed for moderate pain 60 capsule 1    celecoxib (CELEBREX) 100 MG capsule Take 1 capsule (100 mg) by mouth 2 times daily 60 capsule 0    meloxicam (MOBIC) 15 MG tablet Take 1 tablet (15 mg) by mouth daily 30 tablet 0    meloxicam (MOBIC) 15 MG tablet  Take 1 tablet (15 mg) by mouth daily 30 tablet 0    meloxicam (MOBIC) 15 MG tablet Take 1 tablet (15 mg) by mouth daily 30 tablet 0    meloxicam (MOBIC) 15 MG tablet Take 1 tablet (15 mg) by mouth daily for 30 days 30 tablet 0    methylPREDNISolone (MEDROL) 4 MG tablet therapy pack Follow Package Directions 21 tablet 0    predniSONE (DELTASONE) 20 MG tablet Take 2 tablets (40 mg) by mouth daily 14 tablet 0    predniSONE (DELTASONE) 20 MG tablet Take 2 tablets (40 mg) by mouth daily 14 tablet 0       No Known Allergies    Family History   Problem Relation Age of Onset    Cerebrovascular Disease Maternal Grandfather     Heart Disease Paternal Grandfather     Cerebrovascular Disease Maternal Uncle     Breast Cancer Paternal Aunt     Colon Cancer Paternal Uncle     Diabetes No family hx of     Coronary Artery Disease No family hx of     Hypertension No family hx of     Hyperlipidemia No family hx of     Prostate Cancer No family hx of     Depression No family hx of     Anxiety Disorder No family hx of     Asthma No family hx of     Thyroid Disease No family hx of     Genetic Disorder No family hx of      Social History     Socioeconomic History    Marital status:    Tobacco Use    Smoking status: Former     Packs/day: .5     Types: Cigarettes     Quit date: 5/1/2013     Years since quitting: 10.8    Smokeless tobacco: Never   Vaping Use    Vaping Use: Never used   Substance and Sexual Activity    Alcohol use: Yes     Comment: 4-5 drinks per wk    Drug use: No    Sexual activity: Yes     Partners: Female     Birth control/protection: Condom, Female Surgical   Other Topics Concern    Parent/sibling w/ CABG, MI or angioplasty before 65F 55M? No     Service No    Blood Transfusions No    Caffeine Concern No    Occupational Exposure No    Hobby Hazards No    Sleep Concern Yes    Stress Concern Yes    Weight Concern Yes    Special Diet No    Back Care No    Exercise Yes     Comment: walking    Bike Helmet No     Seat Belt Yes    Self-Exams No       Additional medical/Social/Surgical histories reviewed in EPIC and updated as appropriate.     REVIEW OF SYSTEMS (2/23/2024)  10 point ROS of systems including Constitutional, Eyes, Respiratory, Cardiovascular, Gastroenterology, Genitourinary, Integumentary, Musculoskeletal, Psychiatric, Allergic/Immunologic were all negative except for pertinent positives noted in my HPI.     PHYSICAL EXAM  VSS  General  - normal appearance, in no obvious distress  HEENT  - conjunctivae not injected, moist mucous membranes, normocephalic/atraumatic head, ears normal appearance, no lesions, mouth normal appearance, no scars, normal dentition and teeth present  CV  - normal peripheral perfusion  Pulm  - normal respiratory pattern, non-labored  Musculoskeletal - lumbar spine  - stance: normal gait without limp, no obvious leg length discrepancy, normal heel and toe walk  - inspection: normal bone and joint alignment, no obvious scoliosis  - palpation: no paravertebral or bony tenderness  - ROM: flexion exacerbates pain, normal extension, sidebending, rotation  - strength: lower extremities 5/5 in all planes  - special tests:  (+) straight leg raise- right  (+) slump test  Neuro  - patellar and Achilles DTRs 2+ bilaterally, lower extremity sensory deficit throughout L5 distribution, grossly normal coordination, normal muscle tone  Skin  - no ecchymosis, erythema, warmth, or induration, no obvious rash  Psych  - interactive, appropriate, normal mood and affect  Right foot: mild swelling compared to left, some ttp at midfoot    ASSESSMENT & PLAN  59 yo male with lumbar ddd, disc herniations, radiculopathy, worsening, and right foot arthritis    I independently reviewed the following imaging studies:  Lumbar MRI: shows ddd, disc herniations  Discussed and ordered lumbar KUNAL  Had over 50% improvement for 3 months after previous KUNAL  Cont. HEP  And use orthotics  RX given for prednisone  Patient has been  doing home exercise physical therapy program for this problem      Appropriate PPE was utilized for prevention of spread of Covid-19.  Parveen iRck MD, CAQSM

## 2024-02-29 ENCOUNTER — OFFICE VISIT (OUTPATIENT)
Dept: ORTHOPEDICS | Facility: CLINIC | Age: 59
End: 2024-02-29
Payer: COMMERCIAL

## 2024-02-29 ENCOUNTER — TELEPHONE (OUTPATIENT)
Dept: ORTHOPEDICS | Facility: CLINIC | Age: 59
End: 2024-02-29

## 2024-02-29 DIAGNOSIS — G89.29 CHRONIC PAIN OF RIGHT LOWER EXTREMITY: ICD-10-CM

## 2024-02-29 DIAGNOSIS — M51.16 LUMBAR DISC HERNIATION WITH RADICULOPATHY: ICD-10-CM

## 2024-02-29 DIAGNOSIS — M79.604 CHRONIC PAIN OF RIGHT LOWER EXTREMITY: ICD-10-CM

## 2024-02-29 DIAGNOSIS — M51.369 DDD (DEGENERATIVE DISC DISEASE), LUMBAR: Primary | ICD-10-CM

## 2024-02-29 DIAGNOSIS — M19.079 ARTHRITIS OF MIDFOOT: ICD-10-CM

## 2024-02-29 PROCEDURE — 99214 OFFICE O/P EST MOD 30 MIN: CPT | Performed by: PREVENTIVE MEDICINE

## 2024-02-29 RX ORDER — PREDNISONE 20 MG/1
40 TABLET ORAL DAILY
Qty: 14 TABLET | Refills: 0 | Status: SHIPPED | OUTPATIENT
Start: 2024-02-29 | End: 2024-05-02

## 2024-02-29 NOTE — LETTER
2/29/2024         RE: Nando Wolfe  [address]  [facility] [address]        Dear Colleague,    Thank you for referring your patient, Nando Wolfe, to the [facility] [place]. Please see a copy of my visit note below.    HISTORY OF PRESENT ILLNESS  Mr. Wolfe is a pleasant 58 year old year old male who presents to clinic today with the following:  What problem are you here for? Right foot and ankle follow up - worse at the end of the day. Swollen still and has problems with getting shoes on.     How long have you had this problem? 9 months     Have you had any recent imaging of this problem? Xrays/MRI/CT scans? XR right foot 5/30/23; XR right tib/fib 5/30/23; XR lumbar 6/26/23; Lumbar MRI 7/6/23; Right ankle MRI 7/6/23    Have you had treatments for this problem in the past?  -Medications? Yes- Celebrex, prednisone, medrol dose pack (didn't help)  -Physical therapy? Yes - 4-6 weeks  -Injections? Yes- Lumbar KUNAL  -Surgery? No    How severe is this problem today? 0-10 scale? 2    How severe has this problem been at WORST in the past? 0-10 scale? 3    What do you think caused this problem? Unsure    Does this problem or its symptoms cause difficulty for you falling asleep or staying asleep? No    Anything else you want us to know about this problem? No          MEDICAL HISTORY  Patient Active Problem List   Diagnosis     CARDIOVASCULAR SCREENING; LDL GOAL LESS THAN 160     Obese     Back pain with radiation     Elevated blood sugar     DDD (degenerative disc disease), lumbar     Discogenic syndrome, lumbar     Acute deep vein thrombosis (DVT) of left lower extremity, unspecified vein (H)     Factor 5 Leiden mutation, heterozygous (H24)     Chronic bilateral low back pain with right-sided sciatica     Pain in joint, ankle and foot, right       Current Outpatient Medications   Medication Sig Dispense Refill     celecoxib (CELEBREX) 100 MG capsule Take 1 capsule (100  mg) by mouth 2 times daily as needed for moderate pain 60 capsule 1     celecoxib (CELEBREX) 100 MG capsule Take 1 capsule (100 mg) by mouth 2 times daily 60 capsule 0     meloxicam (MOBIC) 15 MG tablet Take 1 tablet (15 mg) by mouth daily 30 tablet 0     meloxicam (MOBIC) 15 MG tablet Take 1 tablet (15 mg) by mouth daily 30 tablet 0     meloxicam (MOBIC) 15 MG tablet Take 1 tablet (15 mg) by mouth daily 30 tablet 0     meloxicam (MOBIC) 15 MG tablet Take 1 tablet (15 mg) by mouth daily for 30 days 30 tablet 0     methylPREDNISolone (MEDROL) 4 MG tablet therapy pack Follow Package Directions 21 tablet 0     predniSONE (DELTASONE) 20 MG tablet Take 2 tablets (40 mg) by mouth daily 14 tablet 0     predniSONE (DELTASONE) 20 MG tablet Take 2 tablets (40 mg) by mouth daily 14 tablet 0       No Known Allergies    Family History   Problem Relation Age of Onset     Cerebrovascular Disease Maternal Grandfather      Heart Disease Paternal Grandfather      Cerebrovascular Disease Maternal Uncle      Breast Cancer Paternal Aunt      Colon Cancer Paternal Uncle      Diabetes No family hx of      Coronary Artery Disease No family hx of      Hypertension No family hx of      Hyperlipidemia No family hx of      Prostate Cancer No family hx of      Depression No family hx of      Anxiety Disorder No family hx of      Asthma No family hx of      Thyroid Disease No family hx of      Genetic Disorder No family hx of      Social History     Socioeconomic History     Marital status:    Tobacco Use     Smoking status: Former     Packs/day: .5     Types: Cigarettes     Quit date: 5/1/2013     Years since quitting: 10.8     Smokeless tobacco: Never   Vaping Use     Vaping Use: Never used   Substance and Sexual Activity     Alcohol use: Yes     Comment: 4-5 drinks per wk     Drug use: No     Sexual activity: Yes     Partners: Female     Birth control/protection: Condom, Female Surgical   Other Topics Concern     Parent/sibling w/  CABG, MI or angioplasty before 65F 55M? No      Service No     Blood Transfusions No     Caffeine Concern No     Occupational Exposure No     Hobby Hazards No     Sleep Concern Yes     Stress Concern Yes     Weight Concern Yes     Special Diet No     Back Care No     Exercise Yes     Comment: walking     Bike Helmet No     Seat Belt Yes     Self-Exams No       Additional medical/Social/Surgical histories reviewed in [facility] and updated as appropriate.     REVIEW OF SYSTEMS (2/23/2024)  10 point ROS of systems including Constitutional, Eyes, Respiratory, Cardiovascular, Gastroenterology, Genitourinary, Integumentary, Musculoskeletal, Psychiatric, Allergic/Immunologic were all negative except for pertinent positives noted in my HPI.     PHYSICAL EXAM  VSS  General  - normal appearance, in no obvious distress  HEENT  - conjunctivae not injected, moist mucous membranes, normocephalic/atraumatic head, ears normal appearance, no lesions, mouth normal appearance, no scars, normal dentition and teeth present  CV  - normal peripheral perfusion  Pulm  - normal respiratory pattern, non-labored  Musculoskeletal - lumbar spine  - stance: normal gait without limp, no obvious leg length discrepancy, normal heel and toe walk  - inspection: normal bone and joint alignment, no obvious scoliosis  - palpation: no paravertebral or bony tenderness  - ROM: flexion exacerbates pain, normal extension, sidebending, rotation  - strength: lower extremities 5/5 in all planes  - special tests:  (+) straight leg raise- right  (+) slump test  Neuro  - patellar and Achilles DTRs 2+ bilaterally, lower extremity sensory deficit throughout L5 distribution, grossly normal coordination, normal muscle tone  Skin  - no ecchymosis, erythema, warmth, or induration, no obvious rash  Psych  - interactive, appropriate, normal mood and affect  Right foot: mild swelling compared to left, some ttp at midfoot    ASSESSMENT & PLAN  57 yo male with lumbar ddd,  disc herniations, radiculopathy, worsening, and right foot arthritis    I independently reviewed the following imaging studies:  Lumbar MRI: shows ddd, disc herniations  Discussed and ordered lumbar KUNAL  Had over 50% improvement for 3 months after previous KUNAL  Cont. HEP  And use orthotics  RX given for prednisone  Patient has been doing home exercise physical therapy program for this problem      Appropriate PPE was utilized for prevention of spread of Covid-19.  Parveen Rick MD, CA[facility]      Again, thank you for allowing me to participate in the care of your patient.        Sincerely,        Parveen Rick MD

## 2024-02-29 NOTE — PATIENT INSTRUCTIONS
Thanks for coming today.  Ortho/Sports Medicine Clinic  [address] [place] [address]    To schedule future appointments in Ortho Clinic, you may call [phone].    To schedule ordered imaging or an injection ordered by your provider:  Call Central [facility] Injection scheduling line: [phone]    MyChart available online at:  [email or web address]    Please call if any further questions or concerns ([phone]).  Clinic hours 8 am to 5 pm.    Return to clinic (call) if symptoms worsen or fail to improve.

## 2024-02-29 NOTE — TELEPHONE ENCOUNTER
Left Voicemail (1st Attempt) for the patient to call back and schedule the following:    Appointment type: return  Provider: dr. figueroa  Return date: 2 weeks after epidural injection  Specialty phone number: [phone]  Additional appointment(s) needed: epidural injection    [facility]   Orthopedics, Podiatry, Sports Medicine, Ent ,Eye , Audiology, Adult Endocrine & Diabetes, Nutrition & Medication Therapy Management Specialties   [facility]

## 2024-03-06 NOTE — TELEPHONE ENCOUNTER
Left Voicemail (2nd Attempt) for the patient to call back and schedule the following:    Appointment type: return  Provider: dr. figueroa  Return date: 2 weeks after epidural injection  Specialty phone number: [phone]  Additional appointment(s) needed: epidural injection    [facility]   Orthopedics, Podiatry, Sports Medicine, Ent ,Eye , Audiology, Adult Endocrine & Diabetes, Nutrition & Medication Therapy Management Specialties   [facility]

## 2024-04-19 ENCOUNTER — ANCILLARY PROCEDURE (OUTPATIENT)
Dept: GENERAL RADIOLOGY | Facility: CLINIC | Age: 59
End: 2024-04-19
Attending: PREVENTIVE MEDICINE
Payer: COMMERCIAL

## 2024-04-19 DIAGNOSIS — M51.369 DDD (DEGENERATIVE DISC DISEASE), LUMBAR: ICD-10-CM

## 2024-04-19 DIAGNOSIS — M51.16 LUMBAR DISC HERNIATION WITH RADICULOPATHY: ICD-10-CM

## 2024-04-19 PROCEDURE — 62323 NJX INTERLAMINAR LMBR/SAC: CPT | Performed by: RADIOLOGY

## 2024-04-19 RX ORDER — LIDOCAINE HYDROCHLORIDE 10 MG/ML
5 INJECTION, SOLUTION EPIDURAL; INFILTRATION; INTRACAUDAL; PERINEURAL ONCE
Status: COMPLETED | OUTPATIENT
Start: 2024-04-19 | End: 2024-04-19

## 2024-04-19 RX ORDER — IOPAMIDOL 408 MG/ML
2 INJECTION, SOLUTION INTRATHECAL ONCE
Status: COMPLETED | OUTPATIENT
Start: 2024-04-19 | End: 2024-04-19

## 2024-04-19 RX ORDER — BUPIVACAINE HYDROCHLORIDE 5 MG/ML
10 INJECTION, SOLUTION EPIDURAL; INTRACAUDAL ONCE
Status: COMPLETED | OUTPATIENT
Start: 2024-04-19 | End: 2024-04-19

## 2024-04-19 RX ORDER — METHYLPREDNISOLONE ACETATE 80 MG/ML
80 INJECTION, SUSPENSION INTRA-ARTICULAR; INTRALESIONAL; INTRAMUSCULAR; SOFT TISSUE ONCE
Status: COMPLETED | OUTPATIENT
Start: 2024-04-19 | End: 2024-04-19

## 2024-04-19 RX ADMIN — LIDOCAINE HYDROCHLORIDE 5 ML: 10 INJECTION, SOLUTION EPIDURAL; INFILTRATION; INTRACAUDAL; PERINEURAL at 11:42

## 2024-04-19 RX ADMIN — BUPIVACAINE HYDROCHLORIDE 50 MG: 5 INJECTION, SOLUTION EPIDURAL; INTRACAUDAL at 11:41

## 2024-04-19 RX ADMIN — IOPAMIDOL 2 ML: 408 INJECTION, SOLUTION INTRATHECAL at 11:42

## 2024-04-19 RX ADMIN — METHYLPREDNISOLONE ACETATE 80 MG: 80 INJECTION, SUSPENSION INTRA-ARTICULAR; INTRALESIONAL; INTRAMUSCULAR; SOFT TISSUE at 11:42

## 2024-04-19 NOTE — DISCHARGE INSTRUCTIONS
AFTER YOU GO HOME    DO relax; minimize your activity for 24 hours  You may resume normal activity tomorrow  You may remove the bandage in the evening or next morning  You may resume bathing the next day  Drink at least 4 extra glasses of fluid today if not on fluid restrictions  DO NOT drive or operate machinery at home or at work for at least 24 hours      VISIT THE EMERGENCY ROOM OR URGENT CARE IF:    There is redness or swelling at the injection site  There is discharge from the injection site  You develop a temperature of 101  F or greater      ADDITIONAL INSTRUCTIONS:     You may resume your Coumadin or other blood thinner at your regular dose today.  Follow up with your physician to have your INR rechecked if indicated.  If you gain no relief from the injection after two (2) weeks, follow-up with your provider for your options.        Contacts:    During business hours from 8 to 5 pm, you may call [phone] to reach a nurse advisor at [facility].  After hours, call [facility]  [phone].  Ask for the Radiologist on-call.  Someone is on-call 24 hrs/day.  [facility] Toll Free Number   [phone]

## 2024-04-19 NOTE — PROGRESS NOTES
Nando was seen in X-ray today for a epidural injection. Patient rated pain before procedure 1/10. After procedure patient rated pain 0/10.   This pain level is acceptable to patient. Patient discharged home with .

## 2024-05-02 ENCOUNTER — OFFICE VISIT (OUTPATIENT)
Dept: FAMILY MEDICINE | Facility: CLINIC | Age: 59
End: 2024-05-02
Payer: COMMERCIAL

## 2024-05-02 VITALS
OXYGEN SATURATION: 98 % | HEIGHT: 68 IN | WEIGHT: 213 LBS | DIASTOLIC BLOOD PRESSURE: 76 MMHG | HEART RATE: 74 BPM | SYSTOLIC BLOOD PRESSURE: 116 MMHG | RESPIRATION RATE: 18 BRPM | TEMPERATURE: 96.8 F | BODY MASS INDEX: 32.28 KG/M2

## 2024-05-02 DIAGNOSIS — Z12.5 SCREENING FOR PROSTATE CANCER: ICD-10-CM

## 2024-05-02 DIAGNOSIS — R60.0 EDEMA OF RIGHT LOWER LEG DUE TO PERIPHERAL VENOUS INSUFFICIENCY: ICD-10-CM

## 2024-05-02 DIAGNOSIS — I87.2 EDEMA OF RIGHT LOWER LEG DUE TO PERIPHERAL VENOUS INSUFFICIENCY: ICD-10-CM

## 2024-05-02 DIAGNOSIS — Z13.6 CARDIOVASCULAR SCREENING; LDL GOAL LESS THAN 100: ICD-10-CM

## 2024-05-02 DIAGNOSIS — Z00.00 ENCOUNTER FOR ANNUAL PHYSICAL EXAM: Primary | ICD-10-CM

## 2024-05-02 DIAGNOSIS — M54.16 CHRONIC RIGHT-SIDED LUMBAR RADICULOPATHY: ICD-10-CM

## 2024-05-02 DIAGNOSIS — D68.51 FACTOR 5 LEIDEN MUTATION, HETEROZYGOUS (H): ICD-10-CM

## 2024-05-02 PROBLEM — I82.402 ACUTE DEEP VEIN THROMBOSIS (DVT) OF LEFT LOWER EXTREMITY, UNSPECIFIED VEIN (H): Status: RESOLVED | Noted: 2022-09-14 | Resolved: 2024-05-02

## 2024-05-02 LAB
BASOPHILS # BLD AUTO: 0 10E3/UL (ref 0–0.2)
BASOPHILS NFR BLD AUTO: 1 %
EOSINOPHIL # BLD AUTO: 0.1 10E3/UL (ref 0–0.7)
EOSINOPHIL NFR BLD AUTO: 3 %
ERYTHROCYTE [DISTWIDTH] IN BLOOD BY AUTOMATED COUNT: 12.1 % (ref 10–15)
HCT VFR BLD AUTO: 44.5 % (ref 40–53)
HGB BLD-MCNC: 15.3 G/DL (ref 13.3–17.7)
IMM GRANULOCYTES # BLD: 0 10E3/UL
IMM GRANULOCYTES NFR BLD: 0 %
LYMPHOCYTES # BLD AUTO: 1.6 10E3/UL (ref 0.8–5.3)
LYMPHOCYTES NFR BLD AUTO: 29 %
MCH RBC QN AUTO: 29.3 PG (ref 26.5–33)
MCHC RBC AUTO-ENTMCNC: 34.4 G/DL (ref 31.5–36.5)
MCV RBC AUTO: 85 FL (ref 78–100)
MONOCYTES # BLD AUTO: 0.5 10E3/UL (ref 0–1.3)
MONOCYTES NFR BLD AUTO: 10 %
NEUTROPHILS # BLD AUTO: 3.2 10E3/UL (ref 1.6–8.3)
NEUTROPHILS NFR BLD AUTO: 58 %
PLATELET # BLD AUTO: 206 10E3/UL (ref 150–450)
RBC # BLD AUTO: 5.22 10E6/UL (ref 4.4–5.9)
WBC # BLD AUTO: 5.5 10E3/UL (ref 4–11)

## 2024-05-02 PROCEDURE — G0103 PSA SCREENING: HCPCS | Performed by: INTERNAL MEDICINE

## 2024-05-02 PROCEDURE — 36415 COLL VENOUS BLD VENIPUNCTURE: CPT | Performed by: INTERNAL MEDICINE

## 2024-05-02 PROCEDURE — 99396 PREV VISIT EST AGE 40-64: CPT | Performed by: INTERNAL MEDICINE

## 2024-05-02 PROCEDURE — 80053 COMPREHEN METABOLIC PANEL: CPT | Performed by: INTERNAL MEDICINE

## 2024-05-02 PROCEDURE — 85025 COMPLETE CBC W/AUTO DIFF WBC: CPT | Performed by: INTERNAL MEDICINE

## 2024-05-02 PROCEDURE — 80061 LIPID PANEL: CPT | Performed by: INTERNAL MEDICINE

## 2024-05-02 SDOH — HEALTH STABILITY: PHYSICAL HEALTH: ON AVERAGE, HOW MANY DAYS PER WEEK DO YOU ENGAGE IN MODERATE TO STRENUOUS EXERCISE (LIKE A BRISK WALK)?: 3 DAYS

## 2024-05-02 SDOH — HEALTH STABILITY: PHYSICAL HEALTH: ON AVERAGE, HOW MANY MINUTES DO YOU ENGAGE IN EXERCISE AT THIS LEVEL?: 30 MIN

## 2024-05-02 ASSESSMENT — SOCIAL DETERMINANTS OF HEALTH (SDOH): HOW OFTEN DO YOU GET TOGETHER WITH FRIENDS OR RELATIVES?: ONCE A WEEK

## 2024-05-02 ASSESSMENT — PAIN SCALES - GENERAL: PAINLEVEL: MILD PAIN (2)

## 2024-05-02 NOTE — PROGRESS NOTES
{PROVIDER CHARTING PREFERENCE:731691}    Gurpreet Fernandez is a 58 year old, presenting for the following health issues:  Physical      5/2/2024     7:45 AM   Additional Questions   Roomed by iwona   Accompanied by self     HPI     {MA/LPN/RN Pre-Provider Visit Orders- hCG/UA/Strep (Optional):339871}  {SUPERLIST (Optional):803317}  {additonal problems for provider to add (Optional):215346}    {ROS Picklists (Optional):507415}      Objective    There were no vitals taken for this visit.  There is no height or weight on file to calculate BMI.  Physical Exam   {Exam List (Optional):240376}    {Diagnostic Test Results (Optional):151269}        Signed Electronically by: Santos Duenas MD  {Email feedback regarding this note to [email or web address]   :451682}

## 2024-05-03 LAB
ALBUMIN SERPL BCG-MCNC: 4.8 G/DL (ref 3.5–5.2)
ALP SERPL-CCNC: 80 U/L (ref 40–150)
ALT SERPL W P-5'-P-CCNC: 18 U/L (ref 0–70)
ANION GAP SERPL CALCULATED.3IONS-SCNC: 10 MMOL/L (ref 7–15)
AST SERPL W P-5'-P-CCNC: 18 U/L (ref 0–45)
BILIRUB SERPL-MCNC: 0.5 MG/DL
BUN SERPL-MCNC: 19.4 MG/DL (ref 8–23)
CALCIUM SERPL-MCNC: 9.3 MG/DL (ref 8.6–10)
CHLORIDE SERPL-SCNC: 103 MMOL/L (ref 98–107)
CHOLEST SERPL-MCNC: 197 MG/DL
CREAT SERPL-MCNC: 0.9 MG/DL (ref 0.67–1.17)
DEPRECATED HCO3 PLAS-SCNC: 25 MMOL/L (ref 22–29)
EGFRCR SERPLBLD CKD-EPI 2021: >90 ML/MIN/1.73M2
FASTING STATUS PATIENT QL REPORTED: YES
GLUCOSE SERPL-MCNC: 116 MG/DL (ref 70–99)
HDLC SERPL-MCNC: 48 MG/DL
LDLC SERPL CALC-MCNC: 138 MG/DL
NONHDLC SERPL-MCNC: 149 MG/DL
POTASSIUM SERPL-SCNC: 4.4 MMOL/L (ref 3.4–5.3)
PROT SERPL-MCNC: 7.4 G/DL (ref 6.4–8.3)
PSA SERPL DL<=0.01 NG/ML-MCNC: 0.75 NG/ML (ref 0–3.5)
SODIUM SERPL-SCNC: 138 MMOL/L (ref 135–145)
TRIGL SERPL-MCNC: 53 MG/DL

## 2024-05-04 ENCOUNTER — MYC MEDICAL ADVICE (OUTPATIENT)
Dept: ORTHOPEDICS | Facility: CLINIC | Age: 59
End: 2024-05-04
Payer: COMMERCIAL

## 2024-05-06 ENCOUNTER — VIRTUAL VISIT (OUTPATIENT)
Dept: ORTHOPEDICS | Facility: CLINIC | Age: 59
End: 2024-05-06
Payer: COMMERCIAL

## 2024-05-06 DIAGNOSIS — M77.50 TENDONITIS OF ANKLE: ICD-10-CM

## 2024-05-06 DIAGNOSIS — M51.16 LUMBAR DISC HERNIATION WITH RADICULOPATHY: ICD-10-CM

## 2024-05-06 DIAGNOSIS — M51.369 DDD (DEGENERATIVE DISC DISEASE), LUMBAR: Primary | ICD-10-CM

## 2024-05-06 DIAGNOSIS — M19.071 ARTHRITIS OF RIGHT ANKLE: ICD-10-CM

## 2024-05-06 PROCEDURE — 99442 PR PHYSICIAN TELEPHONE EVALUATION 11-20 MIN: CPT | Performed by: PREVENTIVE MEDICINE

## 2024-05-06 NOTE — LETTER
5/6/2024         RE: Nando Wolfe  [address]  [facility] [address]        Dear Colleague,    Thank you for referring your patient, Nando Wolfe, to the [facility]IC[facility] [place]. Please see a copy of my visit note below.    Patient is a  58   year old who is being evaluated via a billable telephone visit.      What phone number would you like to be contacted at? CELL  How would you like to obtain your AVS? MYCHART        Subjective   Patient is a   58  year old who presents by phone call visit for the following:     HPI   Followup for lumbar radiculopathy  Had lumbar KUNAL about 2 weeks ago that did improve his symptoms into his leg and low back for about a week or so, but feels like it is going backwards again  Wants to discuss swelling in ankle as well that has been persistent and may want another injection or further workup      Review of Systems   Constitutional, HEENT, cardiovascular, pulmonary, gi and gu systems are negative, except as otherwise noted.      Objective           Vitals:  No vitals were obtained today due to virtual visit.    Physical Exam   healthy, alert, and no distress  PSYCH: Alert and oriented times 3; coherent speech, normal   rate and volume, able to articulate logical thoughts, able   to abstract reason, no tangential thoughts, no hallucinations   or delusions  His affect is normal  RESP: No cough, no audible wheezing, able to talk in full sentences  Remainder of exam unable to be completed due to telephone visits    Assessment/Plan  57 yo male with lumbar radiculopathy, and ankle pain and swelling, not resolved    I independently reviewed the following imaging studies and discussed with patient:  Lumbar MRI shows   1.  Lower lumbar spondylosis with severe L4-L5 and moderate L5-S1 degenerative interspace narrowing with associated endplate irregularity and Modic type II changes. Disc herniations, interbody spurring, interspace narrowing and  facet degenerative changes   contribute to moderate to severe canal stenosis at L4-L5 and left lateral recess narrowing at L5-S1 without canal stenosis. There is moderate right and mild left L4-L5 and severe left and moderate right foraminal stenosis.     2.  Minor posterior disc bulging L2-L3 without significant canal or foraminal stenosis.    Reviewed this with him and discussed followup in 3 weeks to see how he has responded at that point to KUNAL and consider an ankle injection  He can continue HEP          Phone call duration: 20 minutes  Phone call start: 810am  Phone call end: 820am  Dr Rick      Again, thank you for allowing me to participate in the care of your patient.        Sincerely,        Parveen Rick MD

## 2024-05-06 NOTE — TELEPHONE ENCOUNTER
Other: Patient is ok with phone visit instead of in person per message from Dr Rick.      [place] or would you prefer to receive a phone call?:   Patient would prefer a phone call   Okay to leave a detailed message?: Yes at [phone]

## 2024-05-06 NOTE — LETTER
5/6/2024         RE: Nando Wolfe  [address]  [facility] [address]        Dear Colleague,    Thank you for referring your patient, Nando Wolfe, to the [facility] MEDICINE [facility] [place]. Please see a copy of my visit note below.    Patient is a  58   year old who is being evaluated via a billable telephone visit.      What phone number would you like to be contacted at? CELL  How would you like to obtain your AVS? MYCHART        Subjective   Patient is a   58  year old who presents by phone call visit for the following:     HPI   Followup for lumbar radiculopathy  Had lumbar KUNAL about 2 weeks ago that did improve his symptoms into his leg and low back for about a week or so, but feels like it is going backwards again  Wants to discuss swelling in ankle as well that has been persistent and may want another injection or further workup      Review of Systems   Constitutional, HEENT, cardiovascular, pulmonary, gi and gu systems are negative, except as otherwise noted.      Objective           Vitals:  No vitals were obtained today due to virtual visit.    Physical Exam   healthy, alert, and no distress  PSYCH: Alert and oriented times 3; coherent speech, normal   rate and volume, able to articulate logical thoughts, able   to abstract reason, no tangential thoughts, no hallucinations   or delusions  His affect is normal  RESP: No cough, no audible wheezing, able to talk in full sentences  Remainder of exam unable to be completed due to telephone visits    Assessment/Plan  57 yo male with lumbar radiculopathy, and ankle pain and swelling, not resolved    I independently reviewed the following imaging studies and discussed with patient:  Lumbar MRI shows   1.  Lower lumbar spondylosis with severe L4-L5 and moderate L5-S1 degenerative interspace narrowing with associated endplate irregularity and Modic type II changes. Disc herniations, interbody spurring, interspace narrowing and  facet degenerative changes   contribute to moderate to severe canal stenosis at L4-L5 and left lateral recess narrowing at L5-S1 without canal stenosis. There is moderate right and mild left L4-L5 and severe left and moderate right foraminal stenosis.     2.  Minor posterior disc bulging L2-L3 without significant canal or foraminal stenosis.    Reviewed this with him and discussed followup in 3 weeks to see how he has responded at that point to KUNAL and consider an ankle injection  He can continue HEP          Phone call duration: 20 minutes  Phone call start: 810am  Phone call end: 820am  Dr Rick      Again, thank you for allowing me to participate in the care of your patient.        Sincerely,        Parveen Rick MD

## 2024-05-06 NOTE — PROGRESS NOTES
Patient is a  58   year old who is being evaluated via a billable telephone visit.      What phone number would you like to be contacted at? CELL  How would you like to obtain your AVS? CHICHI        Subjective   Patient is a   58  year old who presents by phone call visit for the following:     HPI   Followup for lumbar radiculopathy  Had lumbar KUNAL about 2 weeks ago that did improve his symptoms into his leg and low back for about a week or so, but feels like it is going backwards again  Wants to discuss swelling in ankle as well that has been persistent and may want another injection or further workup      Review of Systems   Constitutional, HEENT, cardiovascular, pulmonary, gi and gu systems are negative, except as otherwise noted.      Objective           Vitals:  No vitals were obtained today due to virtual visit.    Physical Exam   healthy, alert, and no distress  PSYCH: Alert and oriented times 3; coherent speech, normal   rate and volume, able to articulate logical thoughts, able   to abstract reason, no tangential thoughts, no hallucinations   or delusions  His affect is normal  RESP: No cough, no audible wheezing, able to talk in full sentences  Remainder of exam unable to be completed due to telephone visits    Assessment/Plan  57 yo male with lumbar radiculopathy, and ankle pain and swelling, not resolved    I independently reviewed the following imaging studies and discussed with patient:  Lumbar MRI shows   1.  Lower lumbar spondylosis with severe L4-L5 and moderate L5-S1 degenerative interspace narrowing with associated endplate irregularity and Modic type II changes. Disc herniations, interbody spurring, interspace narrowing and facet degenerative changes   contribute to moderate to severe canal stenosis at L4-L5 and left lateral recess narrowing at L5-S1 without canal stenosis. There is moderate right and mild left L4-L5 and severe left and moderate right foraminal stenosis.     2.  Minor  posterior disc bulging L2-L3 without significant canal or foraminal stenosis.    Reviewed this with him and discussed followup in 3 weeks to see how he has responded at that point to KUNAL and consider an ankle injection  He can continue HEP          Phone call duration: 20 minutes  Phone call start: 810am  Phone call end: 820am  Dr Rick

## 2024-05-08 NOTE — PROGRESS NOTES
"  SUBJECTIVE:   Jim is a 58 year old male who presents today for an annual physical exam.    Physical Health:  In general, how would you rate your overall physical health? Good  Outside of work, how many days during the week do you exercise? none  Outside of work, approximately how many minutes a day do you exercise? none  If you drink alcohol do you typically have >3 drinks per day or >7 drinks per week? no  Do you usually eat at least 4 servings of fruit and vegetables a day, include whole grains & fiber and avoid regularly eating high fat or \"junk\" foods? yes  Do you have any problems taking medications regularly?  no  Do you have any side effects from medications? no  Needs assistance for the following daily activities: no  Which of the following safety concerns are present in your home?  none  Hearing impairment: no  In the past 6 months, have you been bothered by leaking of urine? no  Sleep: fair      Today's PHQ-2 Score:       2024     7:46 AM   PHQ-2 (  [employer])   Q1: Little interest or pleasure in doing things 0   Q2: Feeling down, depressed or hopeless 0   PHQ-2 Score 0       Social History     Tobacco Use    Smoking status: Former     Current packs/day: 0.00     Types: Cigarettes     Quit date: 2013     Years since quittin.0     Passive exposure: Never    Smokeless tobacco: Never   Substance Use Topics    Alcohol use: Yes     Comment: 4-5 drinks per wk           2024     7:44 AM   Alcohol Use   Prescreen: >3 drinks/day or >7 drinks/week? No          No data to display                Last PSA:   PSA   Date Value Ref Range Status   2017 0.52 0 - 4 ug/L Final     Comment:     Assay Method:  Chemiluminescence using Siemens Vista analyzer     Prostate Specific Antigen Screen   Date Value Ref Range Status   2024 0.75 0.00 - 3.50 ng/mL Final   2022 0.57 0.00 - 4.00 ug/L Final       Reviewed orders with patient. Reviewed health maintenance and updated orders accordingly - " Yes  Labs reviewed in EPIC  BP Readings from Last 3 Encounters:   24 116/76   23 131/79   23 (!) 142/85    Wt Readings from Last 3 Encounters:   24 96.6 kg (213 lb)   23 98 kg (216 lb)   23 98 kg (216 lb)                  Patient Active Problem List   Diagnosis    CARDIOVASCULAR SCREENING; LDL GOAL LESS THAN 160    Obese    Back pain with radiation    Elevated blood sugar    DDD (degenerative disc disease), lumbar    Discogenic syndrome, lumbar    Factor 5 Leiden mutation, heterozygous (H24)    Chronic bilateral low back pain with right-sided sciatica    Pain in joint, ankle and foot, right    Edema of right lower leg due to peripheral venous insufficiency     Past Surgical History:   Procedure Laterality Date    COLONOSCOPY WITH CO2 INSUFFLATION N/A 2018    Procedure: COLONOSCOPY WITH CO2 INSUFFLATION;  Shania Sosa/Colonoscopy/Screening/[facility] Fax number: [phone];  Surgeon: Hussein Jessica MD;  Location: [facility] OR    HERNIA REPAIR      TONSILLECTOMY         Social History     Tobacco Use    Smoking status: Former     Current packs/day: 0.00     Types: Cigarettes     Quit date: 2013     Years since quittin.0     Passive exposure: Never    Smokeless tobacco: Never   Substance Use Topics    Alcohol use: Yes     Comment: 4-5 drinks per wk     Family History   Problem Relation Age of Onset    Cerebrovascular Disease Maternal Grandfather     Heart Disease Paternal Grandfather     Cerebrovascular Disease Maternal Uncle     Breast Cancer Paternal Aunt     Colon Cancer Paternal Uncle     Diabetes No family hx of     Coronary Artery Disease No family hx of     Hypertension No family hx of     Hyperlipidemia No family hx of     Prostate Cancer No family hx of     Depression No family hx of     Anxiety Disorder No family hx of     Asthma No family hx of     Thyroid Disease No family hx of     Genetic Disorder No family hx of          No Known Allergies  Recent  "Labs   Lab Test 05/02/24  0855 12/02/22  0819 06/23/17  0712   * 136* 110*   HDL 48 48 39*   TRIG 53 56 70   ALT 18 32 32   CR 0.90 0.87 0.89   GFRESTIMATED >90 >90 90   GFRESTBLACK  --   --  >90  African American GFR Calc     POTASSIUM 4.4 4.7 4.1   TSH  --   --  1.24        Reviewed and updated as needed this visit by clinical staff   Tobacco  Allergies  Meds              Reviewed and updated as needed this visit by Provider                    Review of Systems  CONSTITUTIONAL: NEGATIVE for fever, chills, change in weight  INTEGUMENTARY/SKIN: NEGATIVE for worrisome rashes, moles or lesions  EYES: NEGATIVE for vision changes or irritation  ENT/MOUTH: NEGATIVE for ear, mouth and throat problems  RESP: NEGATIVE for significant cough or SOB  CV: NEGATIVE for chest pain, palpitations or peripheral edema  GI: NEGATIVE for nausea, abdominal pain, heartburn, or change in bowel habits  : NEGATIVE for frequency, dysuria, or hematuria  MUSCULOSKELETAL:POSITIVE  for right lumbar radiculoopathy  NEURO: NEGATIVE for weakness, dizziness or paresthesias  ENDOCRINE: NEGATIVE for temperature intolerance, skin/hair changes  HEME/ALLERGY/IMMUNE: POSITIVE  for Factor 5 Leiden mutation.  PSYCHIATRIC: NEGATIVE for changes in mood or affect    OBJECTIVE:   /76 (BP Location: Left arm, Patient Position: Chair, Cuff Size: Adult Regular)   Pulse 74   Temp 96.8  F (36  C) (Tympanic)   Resp 18   Ht 1.727 m (5' 8\")   Wt 96.6 kg (213 lb)   SpO2 98%   BMI 32.39 kg/m     Estimated body mass index is 32.39 kg/m  as calculated from the following:    Height as of this encounter: 1.727 m (5' 8\").    Weight as of this encounter: 96.6 kg (213 lb).  Physical Exam  GENERAL: alert and no distress  EYES: Eyes grossly normal to inspection and conjunctivae and sclerae normal  HENT: normal cephalic/atraumatic and oral mucous membranes moist  NECK: no adenopathy and thyroid normal to palpation  RESP: lungs clear to auscultation - no " rales, rhonchi or wheezes  CV: regular rates and rhythm, no murmur, click or rub, and edema of the right lower extremity.  ABDOMEN: soft, nontender and bowel sounds normal  MS: extremities normal- no gross deformities noted  NEURO: Normal strength and tone, mentation intact and speech normal  PSYCH: mentation appears normal, affect normal/bright    Diagnostic Test Results:  Results for orders placed or performed in visit on 05/02/24   Comprehensive metabolic panel     Status: Abnormal   Result Value Ref Range    Sodium 138 135 - 145 mmol/L    Potassium 4.4 3.4 - 5.3 mmol/L    Carbon Dioxide (CO2) 25 22 - 29 mmol/L    Anion Gap 10 7 - 15 mmol/L    Urea Nitrogen 19.4 8.0 - 23.0 mg/dL    Creatinine 0.90 0.67 - 1.17 mg/dL    GFR Estimate >90 >60 mL/min/1.73m2    Calcium 9.3 8.6 - 10.0 mg/dL    Chloride 103 98 - 107 mmol/L    Glucose 116 (H) 70 - 99 mg/dL    Alkaline Phosphatase 80 40 - 150 U/L    AST 18 0 - 45 U/L    ALT 18 0 - 70 U/L    Protein Total 7.4 6.4 - 8.3 g/dL    Albumin 4.8 3.5 - 5.2 g/dL    Bilirubin Total 0.5 <=1.2 mg/dL   Lipid panel reflex to direct LDL Fasting     Status: Abnormal   Result Value Ref Range    Cholesterol 197 <200 mg/dL    Triglycerides 53 <150 mg/dL    Direct Measure HDL 48 >=40 mg/dL    LDL Cholesterol Calculated 138 (H) <=100 mg/dL    Non HDL Cholesterol 149 (H) <130 mg/dL    Patient Fasting > 8hrs? Yes     Narrative    Cholesterol  Desirable:  <200 mg/dL    Triglycerides  Normal:  Less than 150 mg/dL  Borderline High:  150-199 mg/dL  High:  200-499 mg/dL  Very High:  Greater than or equal to 500 mg/dL    Direct Measure HDL  Female:  Greater than or equal to 50 mg/dL   Male:  Greater than or equal to 40 mg/dL    LDL Cholesterol  Desirable:  <100mg/dL  Above Desirable:  100-129 mg/dL   Borderline High:  130-159 mg/dL   High:  160-189 mg/dL   Very High:  >= 190 mg/dL    Non HDL Cholesterol  Desirable:  130 mg/dL  Above Desirable:  130-159 mg/dL  Borderline High:  160-189 mg/dL  High:   190-219 mg/dL  Very High:  Greater than or equal to 220 mg/dL   PSA, screen     Status: Normal   Result Value Ref Range    Prostate Specific Antigen Screen 0.75 0.00 - 3.50 ng/mL    Narrative    This result is obtained using the Roche Elecsys total PSA method on the shahid e801 immunoassay analyzer. Results obtained with different assay methods or kits cannot be used interchangeably.   CBC with platelets and differential     Status: None   Result Value Ref Range    WBC Count 5.5 4.0 - 11.0 10e3/uL    RBC Count 5.22 4.40 - 5.90 10e6/uL    Hemoglobin 15.3 13.3 - 17.7 g/dL    Hematocrit 44.5 40.0 - 53.0 %    MCV 85 78 - 100 fL    MCH 29.3 26.5 - 33.0 pg    MCHC 34.4 31.5 - 36.5 g/dL    RDW 12.1 10.0 - 15.0 %    Platelet Count 206 150 - 450 10e3/uL    % Neutrophils 58 %    % Lymphocytes 29 %    % Monocytes 10 %    % Eosinophils 3 %    % Basophils 1 %    % Immature Granulocytes 0 %    Absolute Neutrophils 3.2 1.6 - 8.3 10e3/uL    Absolute Lymphocytes 1.6 0.8 - 5.3 10e3/uL    Absolute Monocytes 0.5 0.0 - 1.3 10e3/uL    Absolute Eosinophils 0.1 0.0 - 0.7 10e3/uL    Absolute Basophils 0.0 0.0 - 0.2 10e3/uL    Absolute Immature Granulocytes 0.0 <=0.4 10e3/uL   CBC with platelets and differential     Status: None    Narrative    The following orders were created for panel order CBC with platelets and differential.  Procedure                               Abnormality         Status                     ---------                               -----------         ------                     CBC with platelets and d...[[id number]]                      Final result                 Please view results for these tests on the individual orders.       ASSESSMENT/PLAN:     Encounter for annual physical exam  - REVIEW OF HEALTH MAINTENANCE PROTOCOL ORDERS  - CBC with platelets and differential  - Comprehensive metabolic panel    Factor 5 Leiden mutation, heterozygous (H24)  - REVIEW OF HEALTH MAINTENANCE PROTOCOL ORDERS    Chronic  "right-sided lumbar radiculopathy  - CBC with platelets and differential    CARDIOVASCULAR SCREENING; LDL GOAL LESS THAN 100  - Lipid panel reflex to direct LDL Fasting    Screening for prostate cancer  - PSA, screen    Edema of right lower leg due to peripheral venous insufficiency  - CBC with platelets and differential     Patient has been advised of split billing requirements and indicates understanding: Yes      Counseling  Special attention given to:        Regular exercise       Healthy diet/nutrition       The 10-year ASCVD risk score (Kecia RAMIREZ, et al., 2019) is: 6.8%    Values used to calculate the score:      Age: 58 years      Sex: Male      Is Non- : No      Diabetic: No      Tobacco smoker: No      Systolic Blood Pressure: 116 mmHg      Is BP treated: No      HDL Cholesterol: 48 mg/dL      Total Cholesterol: 197 mg/dL      BMI  Estimated body mass index is 32.39 kg/m  as calculated from the following:    Height as of this encounter: 1.727 m (5' 8\").    Weight as of this encounter: 96.6 kg (213 lb).   Weight management plan: diet and exercise.      He reports that he quit smoking about 11 years ago. His smoking use included cigarettes. He has never been exposed to tobacco smoke. He has never used smokeless tobacco.            Signed Electronically by: Santos Duenas MD  "

## 2024-05-22 ENCOUNTER — OFFICE VISIT (OUTPATIENT)
Dept: ORTHOPEDICS | Facility: CLINIC | Age: 59
End: 2024-05-22
Payer: COMMERCIAL

## 2024-05-22 VITALS — BODY MASS INDEX: 32.28 KG/M2 | WEIGHT: 213 LBS | HEIGHT: 68 IN

## 2024-05-22 DIAGNOSIS — M51.369 DDD (DEGENERATIVE DISC DISEASE), LUMBAR: Primary | ICD-10-CM

## 2024-05-22 DIAGNOSIS — M51.16 LUMBAR DISC HERNIATION WITH RADICULOPATHY: ICD-10-CM

## 2024-05-22 DIAGNOSIS — M19.071 ARTHRITIS OF RIGHT ANKLE: ICD-10-CM

## 2024-05-22 DIAGNOSIS — M77.50 TENDONITIS OF ANKLE: ICD-10-CM

## 2024-05-22 PROCEDURE — 99213 OFFICE O/P EST LOW 20 MIN: CPT | Performed by: PREVENTIVE MEDICINE

## 2024-05-22 NOTE — PROGRESS NOTES
HISTORY OF PRESENT ILLNESS  Mr. Wolfe is a pleasant 58 year old year old male who presents to clinic today with the following:    What problem are you here for: Follow up after lumbar KUNAL on 4/19/2024 and for right foot.     His chief complaint is right foot cramping, pressure and swelling.   How long have you had this problem: 1 year    Have you had any recent imaging of this problem? Xrays/MRI/CT scans:  - MRI of lumbar spine completed on 7/6/2023  - MRI of right ankle completed on 7/6/2023    Have you had treatments for this problem in the past?  -Medications: ibuprofen prn.   -Physical therapy: continues with HEP.   -Injections:  Lumbar KUNAL 4/19/2024: he reports this injection continues to provide decreased pain and cramping in his right foot but does mention he has noticed a slow return to baseline symptoms prior to injection.   Right Ankle CSI 9/13/2023: he is unsure if this injection was helpful, he does not recall.       How severe is this problem today? 0-10 scale: 1-2/10 for bilateral low back and 2-3/10 for right foot.       MEDICAL HISTORY  Patient Active Problem List   Diagnosis    CARDIOVASCULAR SCREENING; LDL GOAL LESS THAN 160    Obese    Back pain with radiation    Elevated blood sugar    DDD (degenerative disc disease), lumbar    Discogenic syndrome, lumbar    Factor 5 Leiden mutation, heterozygous (H24)    Chronic bilateral low back pain with right-sided sciatica    Pain in joint, ankle and foot, right    Edema of right lower leg due to peripheral venous insufficiency       No current outpatient medications on file.       No Known Allergies    Family History   Problem Relation Age of Onset    Cerebrovascular Disease Maternal Grandfather     Heart Disease Paternal Grandfather     Cerebrovascular Disease Maternal Uncle     Breast Cancer Paternal Aunt     Colon Cancer Paternal Uncle     Diabetes No family hx of     Coronary Artery Disease No family hx of     Hypertension No family hx of      Hyperlipidemia No family hx of     Prostate Cancer No family hx of     Depression No family hx of     Anxiety Disorder No family hx of     Asthma No family hx of     Thyroid Disease No family hx of     Genetic Disorder No family hx of      Social History     Socioeconomic History    Marital status:    Tobacco Use    Smoking status: Former     Current packs/day: 0.00     Types: Cigarettes     Quit date: 2013     Years since quittin.0     Passive exposure: Never    Smokeless tobacco: Never   Vaping Use    Vaping status: Never Used   Substance and Sexual Activity    Alcohol use: Yes     Comment: 4-5 drinks per wk    Drug use: No    Sexual activity: Yes     Partners: Female     Birth control/protection: Condom, Female Surgical   Other Topics Concern    Parent/sibling w/ CABG, MI or angioplasty before 65F 55M? No     Service No    Blood Transfusions No    Caffeine Concern No    Occupational Exposure No    Hobby Hazards No    Sleep Concern Yes    Stress Concern Yes    Weight Concern Yes    Special Diet No    Back Care No    Exercise Yes     Comment: walking    Bike Helmet No    Seat Belt Yes    Self-Exams No     Social Determinants of Health     Financial Resource Strain: Low Risk  (2024)    Financial Resource Strain     Within the past 12 months, have you or your family members you live with been unable to get utilities (heat, electricity) when it was really needed?: No   Food Insecurity: Low Risk  (2024)    Food Insecurity     Within the past 12 months, did you worry that your food would run out before you got money to buy more?: No     Within the past 12 months, did the food you bought just not last and you didn t have money to get more?: No   Transportation Needs: Low Risk  (2024)    Transportation Needs     Within the past 12 months, has lack of transportation kept you from medical appointments, getting your medicines, non-medical meetings or appointments, work, or from getting  "things that you need?: No   Physical Activity: Insufficiently Active (5/2/2024)    Exercise Vital Sign     Days of Exercise per Week: 3 days     Minutes of Exercise per Session: 30 min   Stress: No Stress Concern Present (5/2/2024)    [employer] [place] [employer] - Occupational Stress Questionnaire     Feeling of Stress : Only a little   Social Connections: Unknown (5/2/2024)    Social Connection and Isolation Panel [NHANES]     Frequency of Social Gatherings with Friends and Family: Once a week   Interpersonal Safety: Low Risk  (5/2/2024)    Interpersonal Safety     Do you feel physically and emotionally safe where you currently live?: Yes     Within the past 12 months, have you been hit, slapped, kicked or otherwise physically hurt by someone?: No     Within the past 12 months, have you been humiliated or emotionally abused in other ways by your partner or ex-partner?: No   Housing Stability: Low Risk  (5/2/2024)    Housing Stability     Do you have housing? : Yes     Are you worried about losing your housing?: No       Additional medical/Social/Surgical histories reviewed in [facility] and updated as appropriate.     REVIEW OF SYSTEMS (5/22/2024)  10 point ROS of systems including Constitutional, Eyes, Respiratory, Cardiovascular, Gastroenterology, Genitourinary, Integumentary, Musculoskeletal, Psychiatric, Allergic/Immunologic were all negative except for pertinent positives noted in my HPI.     PHYSICAL EXAM  VSS  Vital Signs: Ht 1.727 m (5' 8\")   Wt 96.6 kg (213 lb)   BMI 32.39 kg/m   Patient declined being weighed. Body mass index is 32.39 kg/m .    General  - normal appearance, in no obvious distress  HEENT  - conjunctivae not injected, moist mucous membranes, normocephalic/atraumatic head, ears normal appearance, no lesions, mouth normal appearance, no scars, normal dentition and teeth present  CV  - normal peripheral perfusion  Pulm  - normal respiratory pattern, non-labored  Musculoskeletal - " lumbar spine  - stance: normal gait without limp, no obvious leg length discrepancy, normal heel and toe walk  - inspection: normal bone and joint alignment, no obvious scoliosis  - palpation: no paravertebral or bony tenderness  - ROM: flexion does not exacerbates pain, normal extension, sidebending, rotation  - strength: lower extremities 5/5 in all planes  - special tests:  (+) straight leg raise- some right low back pain  (-) slump test  Neuro  - patellar and Achilles DTRs 2+ bilaterally, lower extremity sensory deficit throughout L5 distribution, grossly normal coordination, normal muscle tone  Skin  - no ecchymosis, erythema, warmth, or induration, no obvious rash  Psych  - interactive, appropriate, normal mood and affect    Right foot and ankle: has some mild edema, +1, no ttp over medial foot or ankle or midfoot specifically or ankle joint, has full ROM, without pain  ASSESSMENT & PLAN  57 yo male with lumbar disc herniations, radiculopathy    I independently reviewed the following imaging studies:  Lumbar MRI shows ddd, disc herniations  Right ankle MRI: essentially within normal limits  Discussed diagnostic injection for foot/ankle, but today there is not a specific point of tenderness, and the swelling and radicular pain seems to be more prevalent  Followup 1 month and consider ordering another KUNAL  Will review suggestion for further vascular imaging per his PCP  Re ordered physical therapy    Patient has been doing home exercise physical therapy program for this problem      Appropriate PPE was utilized for prevention of spread of Covid-19.  Parveen Rick MD, [facility]

## 2024-05-22 NOTE — LETTER
5/22/2024         RE: Nando Wolfe  [address]  [place] [facility] [place] [facility] [address]        Dear Colleague,    Thank you for referring your patient, Nando Wolfe, to the [facility] [place]. Please see a copy of my visit note below.    HISTORY OF PRESENT ILLNESS  Mr. Wolfe is a pleasant 58 year old year old male who presents to clinic today with the following:    What problem are you here for: Follow up after lumbar KUNAL on 4/19/2024 and for right foot.     His chief complaint is right foot cramping, pressure and swelling.   How long have you had this problem: 1 year    Have you had any recent imaging of this problem? Xrays/MRI/CT scans:  - MRI of lumbar spine completed on 7/6/2023  - MRI of right ankle completed on 7/6/2023    Have you had treatments for this problem in the past?  -Medications: ibuprofen prn.   -Physical therapy: continues with HEP.   -Injections:  Lumbar KUNAL 4/19/2024: he reports this injection continues to provide decreased pain and cramping in his right foot but does mention he has noticed a slow return to baseline symptoms prior to injection.   Right Ankle CSI 9/13/2023: he is unsure if this injection was helpful, he does not recall.       How severe is this problem today? 0-10 scale: 1-2/10 for bilateral low back and 2-3/10 for right foot.       MEDICAL HISTORY  Patient Active Problem List   Diagnosis     CARDIOVASCULAR SCREENING; LDL GOAL LESS THAN 160     Obese     Back pain with radiation     Elevated blood sugar     DDD (degenerative disc disease), lumbar     Discogenic syndrome, lumbar     Factor 5 Leiden mutation, heterozygous (H24)     Chronic bilateral low back pain with right-sided sciatica     Pain in joint, ankle and foot, right     Edema of right lower leg due to peripheral venous insufficiency       No current outpatient medications on file.       No Known Allergies    Family History   Problem Relation Age of Onset     Cerebrovascular Disease  Maternal Grandfather      Heart Disease Paternal Grandfather      Cerebrovascular Disease Maternal Uncle      Breast Cancer Paternal Aunt      Colon Cancer Paternal Uncle      Diabetes No family hx of      Coronary Artery Disease No family hx of      Hypertension No family hx of      Hyperlipidemia No family hx of      Prostate Cancer No family hx of      Depression No family hx of      Anxiety Disorder No family hx of      Asthma No family hx of      Thyroid Disease No family hx of      Genetic Disorder No family hx of      Social History     Socioeconomic History     Marital status:    Tobacco Use     Smoking status: Former     Current packs/day: 0.00     Types: Cigarettes     Quit date: 2013     Years since quittin.0     Passive exposure: Never     Smokeless tobacco: Never   Vaping Use     Vaping status: Never Used   Substance and Sexual Activity     Alcohol use: Yes     Comment: 4-5 drinks per wk     Drug use: No     Sexual activity: Yes     Partners: Female     Birth control/protection: Condom, Female Surgical   Other Topics Concern     Parent/sibling w/ CABG, MI or angioplasty before 65F 55M? No      Service No     Blood Transfusions No     Caffeine Concern No     Occupational Exposure No     Hobby Hazards No     Sleep Concern Yes     Stress Concern Yes     Weight Concern Yes     Special Diet No     Back Care No     Exercise Yes     Comment: walking     Bike Helmet No     Seat Belt Yes     Self-Exams No     Social Determinants of Health     Financial Resource Strain: Low Risk  (2024)    Financial Resource Strain      Within the past 12 months, have you or your family members you live with been unable to get utilities (heat, electricity) when it was really needed?: No   Food Insecurity: Low Risk  (2024)    Food Insecurity      Within the past 12 months, did you worry that your food would run out before you got money to buy more?: No      Within the past 12 months, did the food  "you bought just not last and you didn t have money to get more?: No   Transportation Needs: Low Risk  (5/2/2024)    Transportation Needs      Within the past 12 months, has lack of transportation kept you from medical appointments, getting your medicines, non-medical meetings or appointments, work, or from getting things that you need?: No   Physical Activity: Insufficiently Active (5/2/2024)    Exercise Vital Sign      Days of Exercise per Week: 3 days      Minutes of Exercise per Session: 30 min   Stress: No Stress Concern Present (5/2/2024)    [facility] - Occupational Stress Questionnaire      Feeling of Stress : Only a little   Social Connections: Unknown (5/2/2024)    Social Connection and Isolation Panel [NHANES]      Frequency of Social Gatherings with Friends and Family: Once a week   Interpersonal Safety: Low Risk  (5/2/2024)    Interpersonal Safety      Do you feel physically and emotionally safe where you currently live?: Yes      Within the past 12 months, have you been hit, slapped, kicked or otherwise physically hurt by someone?: No      Within the past 12 months, have you been humiliated or emotionally abused in other ways by your partner or ex-partner?: No   Housing Stability: Low Risk  (5/2/2024)    Housing Stability      Do you have housing? : Yes      Are you worried about losing your housing?: No       Additional medical/Social/Surgical histories reviewed in [facility] and updated as appropriate.     REVIEW OF SYSTEMS (5/22/2024)  10 point ROS of systems including Constitutional, Eyes, Respiratory, Cardiovascular, Gastroenterology, Genitourinary, Integumentary, Musculoskeletal, Psychiatric, Allergic/Immunologic were all negative except for pertinent positives noted in my HPI.     PHYSICAL EXAM  VSS  Vital Signs: Ht 1.727 m (5' 8\")   Wt 96.6 kg (213 lb)   BMI 32.39 kg/m   Patient declined being weighed. Body mass index is 32.39 kg/m .    General  - normal appearance, in " no obvious distress  HEENT  - conjunctivae not injected, moist mucous membranes, normocephalic/atraumatic head, ears normal appearance, no lesions, mouth normal appearance, no scars, normal dentition and teeth present  CV  - normal peripheral perfusion  Pulm  - normal respiratory pattern, non-labored  Musculoskeletal - lumbar spine  - stance: normal gait without limp, no obvious leg length discrepancy, normal heel and toe walk  - inspection: normal bone and joint alignment, no obvious scoliosis  - palpation: no paravertebral or bony tenderness  - ROM: flexion does not exacerbates pain, normal extension, sidebending, rotation  - strength: lower extremities 5/5 in all planes  - special tests:  (+) straight leg raise- some right low back pain  (-) slump test  Neuro  - patellar and Achilles DTRs 2+ bilaterally, lower extremity sensory deficit throughout L5 distribution, grossly normal coordination, normal muscle tone  Skin  - no ecchymosis, erythema, warmth, or induration, no obvious rash  Psych  - interactive, appropriate, normal mood and affect    Right foot and ankle: has some mild edema, +1, no ttp over medial foot or ankle or midfoot specifically or ankle joint, has full ROM, without pain  ASSESSMENT & PLAN  57 yo male with lumbar disc herniations, radiculopathy    I independently reviewed the following imaging studies:  Lumbar MRI shows ddd, disc herniations  Right ankle MRI: essentially within normal limits  Discussed diagnostic injection for foot/ankle, but today there is not a specific point of tenderness, and the swelling and radicular pain seems to be more prevalent  Followup 1 month and consider ordering another KUNAL  Will review suggestion for further vascular imaging per his PCP  Re ordered physical therapy    Patient has been doing home exercise physical therapy program for this problem      Appropriate PPE was utilized for prevention of spread of Covid-19.  Parveen Rick MD, CAQSM      Again, thank you  for allowing me to participate in the care of your patient.        Sincerely,        Parveen Rick MD

## 2024-05-30 ENCOUNTER — THERAPY VISIT (OUTPATIENT)
Dept: PHYSICAL THERAPY | Facility: CLINIC | Age: 59
End: 2024-05-30
Attending: PREVENTIVE MEDICINE
Payer: COMMERCIAL

## 2024-05-30 DIAGNOSIS — M51.16 LUMBAR DISC HERNIATION WITH RADICULOPATHY: ICD-10-CM

## 2024-05-30 DIAGNOSIS — M19.071 ARTHRITIS OF RIGHT ANKLE: ICD-10-CM

## 2024-05-30 DIAGNOSIS — M54.41 CHRONIC BILATERAL LOW BACK PAIN WITH RIGHT-SIDED SCIATICA: Primary | ICD-10-CM

## 2024-05-30 DIAGNOSIS — M77.50 TENDONITIS OF ANKLE: ICD-10-CM

## 2024-05-30 DIAGNOSIS — M51.369 DDD (DEGENERATIVE DISC DISEASE), LUMBAR: ICD-10-CM

## 2024-05-30 DIAGNOSIS — G89.29 CHRONIC BILATERAL LOW BACK PAIN WITH RIGHT-SIDED SCIATICA: Primary | ICD-10-CM

## 2024-05-30 PROCEDURE — 97162 PT EVAL MOD COMPLEX 30 MIN: CPT | Mod: GP

## 2024-05-30 PROCEDURE — 97110 THERAPEUTIC EXERCISES: CPT | Mod: GP

## 2024-05-30 NOTE — PROGRESS NOTES
"PHYSICAL THERAPY LUMBAR EVALUATION    SUBJECTIVE:  Nando is a 58 year old male who reports that for a long time he has had low back pain.  He reports most of it went away on it's own however.  He states he ended up getting used to his low back pain as well as right leg pain from his nerve.   Last May, he reports eventually developing right leg swelling.  He has a history of blood clots, so this was a concern but was ruled out twice and diagnosed as tendonitis.  He was given an air cast which also didn't do anything.  He then went to [facility], who suspected it was a blood clot again so \"they didn't do anything\".  He went to PT in the fall again and states that with specific range of motion exercises he didn't feel pain in the ankle.  He also states when following with Dr. Rick, Dr. Rick suspects lumbar origin for his right ankle pain. He reports walking prolonged distances irritates his right ankle, but also walking somewhat helps the ankle.  He tries to use his recumbent bike at home as well as stretches/exercises at home. He reports his foot has a \"clamping sensation\" and that after his 4/19/2024 injection he felt some more foot mobility with some foot relief. He also reports the weight of his blanket at night is enough to irritate his foot.    Patient reports symptoms of:  Pain, Stiffness / Tightness, Numbness, Tingliness, and Gait Impairments    Patient report of Pain:  Pain Rating Now: 2/10  Pain Rating at Best: 0/10  Pain Rating at Worst: 4/10  Pain Location: Right Anterior Foot  Pain Quality/Description: Clamping Tightness  Pain Better with: Elevation supine feet on arm rest, Biking on the Exercise Bike, Swimming, Dethatching [place]  Pain Worse with: Specific Positions such as sitting in his car, Compression Socks, Activity without overexerting  Progression of Symptoms: Improvement potentially    Patient reports Red Flags symptoms of:  Calf Swelling/Pain in Calf  History of Blood Clot  Recent Surgery of " "Injection    OBJECTIVE:  Seated Lower Extremity Manual Muscle Test / Myotome Assessment:  Hip Flexion (L2): Right Leg 5/5, Left Leg 5/5  Knee Extension (L3): Right Leg 5/5, Left Leg 5/5  Ankle Dorsiflexion (L4): Right Leg 5/5, Left Leg 5/5  Great Toe (L5): Right Leg 5/5, Left Leg 5/5  Ankle Plantarflexion (S1): Right Leg 5/5, Left Leg 5/5  Knee Flexion (S2): Right Leg 5/5, Left Leg 5/5    Seated Neural Tension Assessment:   Slump Test: Right Leg: Negative Slump Test, Left Leg: Negative Slump Test  \"Just Tight\" bilaterally    Standing Lumbar Active ROM:  Standing Lumbar Flexion (Hands slide down thighs): Fingertips to Mid Shins  Standing Lumbar Extension: Mild Restriction of 25% of ROM  Right Lumbar Side Bend: Mild Restriction of 25% of ROM and Left Lumbar Side Bend: Mild Restriction of 25% of ROM     Jose Angel FRANCOIS Repeated Movements Lumbar Assessment:  Repeated Lumbar Flexion in Standing: No Worse and \"Might be better, foot feels a little more loose\"  Repeated Lumbar Extension with Prone Lying: No Effect  Repeated Lumbar Extension with Prone on Elbows: No Effect  Repeated Lumbar Extension with Prone Press Up: No Effect  Repeated Lumbar Flexion Double Knees to Chest in Supine Better    Jose Angel LEUNGT Classification: Derangement Flexion Responder.  Unclear if truly a Flexion responder, but did feel \"Better\" with lumbar flexion based movements so will initiate with these.         ASSESSMENT:  Nando is a 58 year old male referred to Physical Therapy for Chronic Low Back Pain   from [facility].  Nando demonstrates findings of Pain, Motion Loss, and Radicular Symptoms that justify a need for formal Physical Therapy. These impairments interfere with their ability to perform self care tasks, work tasks, recreational activities, household chores, driving , household mobility, and community mobility as compared to their previous level of function.    Medical Diagnosis: Chronic Low Back Pain    Treatment Diagnosis: " Chronic Low Back Pain with Right Sciatica     Clinical Decision Making (Complexity):  Clinical Presentation: Evolving/Changing  Clinical Presentation Rationale: based on medical and personal factors listed in PT evaluation  Clinical Decision Making (Complexity): Moderate complexity      PHYSICAL THERAPY PLAN OF CARE:  Treatment Interventions:  Interventions: Manual Therapy, Neuromuscular Re-education, Therapeutic Activity, Therapeutic Exercise    Long Term Goals     PT Goal 1  Goal Identifier: Ambulation  Goal Description: Will be able to walk 1 mile without right foot pain or clamping.  Rationale: to maximize safety and independence with performance of ADLs and functional tasks;to maximize safety and independence within the home;to maximize safety and independence within the community;to maximize safety and independence with transportation;to maximize safety and independence with self cares  Goal Progress: Currently cannot walk long distances due to right foot pain and clamping  Target Date: 08/22/24    Frequency of Treatment: 1x a week  Duration of Treatment: 12 weeks         Risks and benefits of evaluation/treatment have been explained.   Patient/Family/caregiver agrees with Plan of Care.      Evaluation Time:     PT Eval, Moderate Complexity Minutes (76991): 35         Signing Clinician: Josiah Blanco PT        SUMMARY OF PLAN OF CARE:  Patient presents with a chronic history of right foot pain and swelling with minimal response to foot interventions.  Dr. Rick, his referring provider, has suspicions of a lumbar origin.  His previous physical therapists utilized MDT principles primarily in extension which had no effect on his symptoms.  Jim states that Biking, Recumbent Biking, and Supine with Feet Elevated seem to help his symptoms.  Given all of these are flexion based positions and he reported potentially slight improvement in his foot symptoms with standing lumbar flexion as well as supine double legs  knees to chest we will start with Flexion-Based MDT principles.  If no success, we will modify accordingly in upcoming therapy visits.

## 2024-06-06 ENCOUNTER — THERAPY VISIT (OUTPATIENT)
Dept: PHYSICAL THERAPY | Facility: CLINIC | Age: 59
End: 2024-06-06
Attending: PREVENTIVE MEDICINE
Payer: COMMERCIAL

## 2024-06-06 DIAGNOSIS — G89.29 CHRONIC BILATERAL LOW BACK PAIN WITH RIGHT-SIDED SCIATICA: Primary | ICD-10-CM

## 2024-06-06 DIAGNOSIS — M54.41 CHRONIC BILATERAL LOW BACK PAIN WITH RIGHT-SIDED SCIATICA: Primary | ICD-10-CM

## 2024-06-06 PROCEDURE — 97140 MANUAL THERAPY 1/> REGIONS: CPT | Mod: GP

## 2024-06-06 PROCEDURE — 97110 THERAPEUTIC EXERCISES: CPT | Mod: GP

## 2024-06-12 ENCOUNTER — THERAPY VISIT (OUTPATIENT)
Dept: PHYSICAL THERAPY | Facility: CLINIC | Age: 59
End: 2024-06-12
Payer: COMMERCIAL

## 2024-06-12 DIAGNOSIS — G89.29 CHRONIC BILATERAL LOW BACK PAIN WITH RIGHT-SIDED SCIATICA: Primary | ICD-10-CM

## 2024-06-12 DIAGNOSIS — M54.41 CHRONIC BILATERAL LOW BACK PAIN WITH RIGHT-SIDED SCIATICA: Primary | ICD-10-CM

## 2024-06-12 PROCEDURE — 97110 THERAPEUTIC EXERCISES: CPT | Mod: GP

## 2024-06-19 ENCOUNTER — THERAPY VISIT (OUTPATIENT)
Dept: PHYSICAL THERAPY | Facility: CLINIC | Age: 59
End: 2024-06-19
Payer: COMMERCIAL

## 2024-06-19 DIAGNOSIS — G89.29 CHRONIC BILATERAL LOW BACK PAIN WITH RIGHT-SIDED SCIATICA: Primary | ICD-10-CM

## 2024-06-19 DIAGNOSIS — M54.41 CHRONIC BILATERAL LOW BACK PAIN WITH RIGHT-SIDED SCIATICA: Primary | ICD-10-CM

## 2024-06-19 PROCEDURE — 97140 MANUAL THERAPY 1/> REGIONS: CPT | Mod: GP

## 2024-06-19 PROCEDURE — 97110 THERAPEUTIC EXERCISES: CPT | Mod: GP

## 2024-07-09 ENCOUNTER — OFFICE VISIT (OUTPATIENT)
Dept: URGENT CARE | Facility: URGENT CARE | Age: 59
End: 2024-07-09
Payer: COMMERCIAL

## 2024-07-09 VITALS
WEIGHT: 217.6 LBS | BODY MASS INDEX: 33.09 KG/M2 | OXYGEN SATURATION: 95 % | HEART RATE: 101 BPM | DIASTOLIC BLOOD PRESSURE: 85 MMHG | TEMPERATURE: 100.9 F | RESPIRATION RATE: 18 BRPM | SYSTOLIC BLOOD PRESSURE: 139 MMHG

## 2024-07-09 DIAGNOSIS — J20.9 ACUTE BRONCHITIS WITH COEXISTING CONDITION REQUIRING PROPHYLACTIC TREATMENT: Primary | ICD-10-CM

## 2024-07-09 PROCEDURE — 99213 OFFICE O/P EST LOW 20 MIN: CPT | Performed by: PHYSICIAN ASSISTANT

## 2024-07-09 RX ORDER — ALBUTEROL SULFATE 90 UG/1
2 AEROSOL, METERED RESPIRATORY (INHALATION) EVERY 4 HOURS PRN
Qty: 18 G | Refills: 0 | Status: SHIPPED | OUTPATIENT
Start: 2024-07-09

## 2024-07-09 RX ORDER — AZITHROMYCIN 250 MG/1
TABLET, FILM COATED ORAL
Qty: 6 TABLET | Refills: 0 | Status: SHIPPED | OUTPATIENT
Start: 2024-07-09

## 2024-07-09 RX ORDER — BENZONATATE 200 MG/1
200 CAPSULE ORAL 3 TIMES DAILY PRN
Qty: 30 CAPSULE | Refills: 0 | Status: SHIPPED | OUTPATIENT
Start: 2024-07-09 | End: 2024-07-19

## 2024-07-09 ASSESSMENT — ENCOUNTER SYMPTOMS
SINUS PRESSURE: 0
DIARRHEA: 0
NAUSEA: 0
SINUS PAIN: 0
FEVER: 1
SORE THROAT: 0
SHORTNESS OF BREATH: 0
RHINORRHEA: 0
FATIGUE: 1
CHILLS: 1
VOMITING: 0
COUGH: 1
PALPITATIONS: 0
WHEEZING: 1
CARDIOVASCULAR NEGATIVE: 1

## 2024-07-09 NOTE — PROGRESS NOTES
Subjective   Jmi is a 58 year old, presenting for the following health issues:  Cough (Wet productive cough since Friday, exposed to bronchitis through his son, wheezing, weakness, worse at night, chills, no runny nose or sore throat )    HPI   Acute Illness  Acute illness concerns:   Onset/Duration: 4days.  Home covid test was negativeX2.  Symptoms:  Fever: YES  Chills/Sweats: YES  Headache (location?): No  Sinus Pressure: No  Conjunctivitis:  No  Ear Pain: no  Rhinorrhea: No  Congestion: No  Sore Throat: No  Cough: YES-productive of yellow sputum  Wheeze: YES  Decreased Appetite: No  Nausea: No  Vomiting: No  Diarrhea: No  Dysuria/Freq.: No  Dysuria or Hematuria: No  Fatigue/Achiness: YES  Sick/Strep Exposure: YES- at home  Therapies tried and outcome: rest,fluids,nyquil,cough drops with minimal relief    Patient Active Problem List   Diagnosis    CARDIOVASCULAR SCREENING; LDL GOAL LESS THAN 160    Obese    Back pain with radiation    Elevated blood sugar    DDD (degenerative disc disease), lumbar    Discogenic syndrome, lumbar    Factor 5 Leiden mutation, heterozygous (H24)    Chronic bilateral low back pain with right-sided sciatica    Pain in joint, ankle and foot, right    Edema of right lower leg due to peripheral venous insufficiency     No current outpatient medications on file.     No current facility-administered medications for this visit.      No Known Allergies    Review of Systems   Constitutional:  Positive for chills, fatigue and fever.   HENT:  Negative for congestion, ear pain, rhinorrhea, sinus pressure, sinus pain and sore throat.    Respiratory:  Positive for cough and wheezing. Negative for shortness of breath.    Cardiovascular: Negative.  Negative for chest pain, palpitations and leg swelling.   Gastrointestinal:  Negative for diarrhea, nausea and vomiting.   All other systems reviewed and are negative.          Objective    /85 (BP Location: Left arm, Patient Position: Sitting,  Cuff Size: Adult Regular)   Pulse 101   Temp (!) 100.9  F (38.3  C) (Tympanic)   Resp 18   Wt 98.7 kg (217 lb 9.6 oz)   SpO2 95%   BMI 33.09 kg/m    Body mass index is 33.09 kg/m .  Physical Exam  Vitals and nursing note reviewed.   Constitutional:       General: He is not in acute distress.     Appearance: Normal appearance. He is normal weight. He is not ill-appearing.   HENT:      Head: Normocephalic and atraumatic.      Ears:      Comments: TMs are intact without any erythema or bulging bilaterally.  Airway is patent.     Nose: Nose normal.      Mouth/Throat:      Lips: Pink.      Mouth: Mucous membranes are moist.      Pharynx: Oropharynx is clear. Uvula midline. No pharyngeal swelling, oropharyngeal exudate, posterior oropharyngeal erythema or uvula swelling.      Tonsils: No tonsillar exudate or tonsillar abscesses.   Eyes:      General: No scleral icterus.     Extraocular Movements: Extraocular movements intact.      Conjunctiva/sclera: Conjunctivae normal.      Pupils: Pupils are equal, round, and reactive to light.   Neck:      Thyroid: No thyromegaly.   Cardiovascular:      Rate and Rhythm: Normal rate and regular rhythm.      Pulses: Normal pulses.      Heart sounds: Normal heart sounds, S1 normal and S2 normal. No murmur heard.     No friction rub. No gallop.   Pulmonary:      Effort: Pulmonary effort is normal. No tachypnea, accessory muscle usage, respiratory distress or retractions.      Breath sounds: Normal breath sounds and air entry. No stridor. No decreased breath sounds, wheezing, rhonchi or rales.   Musculoskeletal:      Cervical back: Normal range of motion and neck supple.   Lymphadenopathy:      Cervical: No cervical adenopathy.   Skin:     General: Skin is warm and dry.      Findings: No rash.   Neurological:      Mental Status: He is alert and oriented to person, place, and time.   Psychiatric:         Mood and Affect: Mood normal.         Behavior: Behavior normal.         Thought  Content: Thought content normal.         Judgment: Judgment normal.              Assessment/Plan:  Acute bronchitis with coexisting condition requiring prophylactic treatment:  Will treat with zithromax X5days, tessalon perles, and albuterol inh as needed for symptoms.  Recommend treatment with rest, fluids and chicken soup. Tylenol/ibuprofen prn fever/pain.  Recheck in clinic if symptoms worsen or if symptoms do not improve.  To the ER if he develops hemoptysis, chest pain, fevers>102, worsening shortness of breath/wheezing.    -     azithromycin (ZITHROMAX) 250 MG tablet; 2 tablets the first day, then 1 tablet daily for the next 4 days  -     benzonatate (TESSALON) 200 MG capsule; Take 1 capsule (200 mg) by mouth 3 times daily as needed for cough  -     albuterol (PROAIR HFA/PROVENTIL HFA/VENTOLIN HFA) 108 (90 Base) MCG/ACT inhaler; Inhale 2 puffs into the lungs every 4 hours as needed for shortness of breath, wheezing or cough Use with spacer        Deborah See KHRIS Bess

## 2024-08-08 NOTE — PROGRESS NOTES
06/19/24 0500   Appointment Info   Signing clinician's name / credentials Josiah Blanco, PT, DPT, CSCS, CLT   Total/Authorized Visits [employer]   Visits Used 4   Medical Diagnosis Chronic Low Back Pain   PT Tx Diagnosis Chronic Low Back Pain with Right Sciatica   Progress Note/Certification   Onset of illness/injury or Date of Surgery 07/06/23   Therapy Frequency 1x a week   Predicted Duration 12 weeks   Progress Note Due Date 08/22/24   Progress Note Completed Date 05/30/24   PT Goal 1   Goal Identifier Ambulation   Goal Description Will be able to walk 1 mile without right foot pain or clamping.   Rationale to maximize safety and independence with performance of ADLs and functional tasks;to maximize safety and independence within the home;to maximize safety and independence within the community;to maximize safety and independence with transportation;to maximize safety and independence with self cares   Goal Progress Currently cannot walk long distances due to right foot pain and clamping   Target Date 08/22/24   Subjective Report   Subjective Report He arrives today stating that the exericses may have caused some lower back pain.  He has been pushing the stretches a little farther and working a little bit harder towards them. The foot hasn't changed   Objective Measures   Objective Measures Objective Measure 1;Objective Measure 2   Objective Measure 2   Objective Measure Symptoms on Arrival   Details Low Back Pain: 4/10.  Right Foot 2/10   Treatment Interventions (PT)   Interventions Therapeutic Procedure/Exercise   Therapeutic Procedure/Exercise   Therapeutic Procedures: strength, endurance, ROM, flexibility minutes (14133) 28   PTRx Ther Proc 1 Prone Press Ups   PTRx Ther Proc 1 - Details Reviewed but caused pain last session so not performed today. Wants to keep on HEP because he likes stretching   PTRx Ther Proc 2 All 4s Cat Cow   PTRx Ther Proc 2 - Details Reviewed but caused pain last session so not performed  today. Wants to keep on HEP because he likes stretching   PTRx Ther Proc 3 Neutral Spine Slouch Overcorrect   PTRx Ther Proc 3 - Details Reviewed but caused pain last session so not performed today. Wants to keep on HEP because he likes stretching   PTRx Ther Proc 4 Lumbar Flexion Rotation   PTRx Ther Proc 4 - Details Reviewed but caused pain last session so not performed today. Wants to keep on HEP because he likes stretching   PTRx Ther Proc 5 Theraband Ankle Dorsiflexion   PTRx Ther Proc 5 - Details 1x30 with Blue TB   PTRx Ther Proc 6 Theraband Ankle Inversion   PTRx Ther Proc 6 - Details 1x30 with Blue Band   PTRx Ther Proc 7 Ankle Eversion Strengthening With Theraband   PTRx Ther Proc 7 - Details 1x30 with Blue Band   Skilled Intervention Continued Mobility and Ankle work   Patient Response/Progress Will perform at home   PTRx Ther Proc 8 Nerve Glide in Hamstring Position   PTRx Ther Proc 8 - Details 1x20   Manual Therapy   Manual Therapy: Mobilization, MFR, MLD, friction massage minutes (10087) 10   Manual Therapy 1 MLD   Manual Therapy 1 - Details MLD for the inguinal, thigh, knee, calf, ankle and foot 1x5 for demonstration, then 1x5 performed by the patient   Skilled Intervention MLD for right foot edema   Patient Response/Progress Will perform at home. Extensive education provided.   Plan   Home program See PTRx   Updates to plan of care Continue per POC   Plan for next session Unfortunately Jim had no success with therapy interventions. Elected to discharge   Total Session Time   Timed Code Treatment Minutes 38   Total Treatment Time (sum of timed and untimed services) 38           DISCHARGE  Reason for Discharge: No further expectation of progress.    Equipment Issued: None    Discharge Plan: Patient to continue home program.    Referring Provider:  Parveen Rick

## 2024-12-18 ENCOUNTER — OFFICE VISIT (OUTPATIENT)
Dept: ORTHOPEDICS | Facility: CLINIC | Age: 59
End: 2024-12-18
Payer: COMMERCIAL

## 2024-12-18 DIAGNOSIS — M51.16 LUMBAR DISC HERNIATION WITH RADICULOPATHY: ICD-10-CM

## 2024-12-18 DIAGNOSIS — M51.362 DEGENERATION OF INTERVERTEBRAL DISC OF LUMBAR REGION WITH DISCOGENIC BACK PAIN AND LOWER EXTREMITY PAIN: Primary | ICD-10-CM

## 2024-12-18 DIAGNOSIS — G89.29 CHRONIC RADICULAR LUMBAR PAIN: ICD-10-CM

## 2024-12-18 DIAGNOSIS — M54.16 CHRONIC RADICULAR LUMBAR PAIN: ICD-10-CM

## 2024-12-18 PROCEDURE — 99213 OFFICE O/P EST LOW 20 MIN: CPT | Performed by: PREVENTIVE MEDICINE

## 2024-12-18 NOTE — PROGRESS NOTES
HISTORY OF PRESENT ILLNESS  Mr. Wolfe is a pleasant 59 year old year old male who presents to clinic today with the following:  What problem are you here for: Follow up for     He will continue to have intermittent episodes of pain and swelling in his right foot/ankle.     How long have you had this problem: Chronic     Have you had any recent imaging of this problem? Xrays/MRI/CT scans:  - MRI of right ankle completed on 7/6/2023  - MRI of lumbar spine completed on 7/6/2023  - XR of right foot completed on 5/30/2023    Have you had treatments for this problem in the past?  -Medications:  Ibuprofen prn  -Physical therapy: Yes, he did attend formal physical therapy but has not continued with HEP due to exercises causing an increase of pain.   -Injections:  Lumbar KUNAL 4/19/2024: the patient reports this injection provided 2.5 months of over 50% decreased pain in his low back but did not help with his right foot pain and swelling.   - Ankle Brace: prn   - Compression stockings prn.     How severe is this problem today? 0-10 scale: 1/10      MEDICAL HISTORY  Patient Active Problem List   Diagnosis    CARDIOVASCULAR SCREENING; LDL GOAL LESS THAN 160    Obese    Back pain with radiation    Elevated blood sugar    DDD (degenerative disc disease), lumbar    Discogenic syndrome, lumbar    Factor 5 Leiden mutation, heterozygous (H)    Chronic bilateral low back pain with right-sided sciatica    Pain in joint, ankle and foot, right    Edema of right lower leg due to peripheral venous insufficiency       Current Outpatient Medications   Medication Sig Dispense Refill    albuterol (PROAIR HFA/PROVENTIL HFA/VENTOLIN HFA) 108 (90 Base) MCG/ACT inhaler Inhale 2 puffs into the lungs every 4 hours as needed for shortness of breath, wheezing or cough Use with spacer 18 g 0    azithromycin (ZITHROMAX) 250 MG tablet 2 tablets the first day, then 1 tablet daily for the next 4 days 6 tablet 0       No Known Allergies    Family History    Problem Relation Age of Onset    Cerebrovascular Disease Maternal Grandfather     Heart Disease Paternal Grandfather     Cerebrovascular Disease Maternal Uncle     Breast Cancer Paternal Aunt     Colon Cancer Paternal Uncle     Diabetes No family hx of     Coronary Artery Disease No family hx of     Hypertension No family hx of     Hyperlipidemia No family hx of     Prostate Cancer No family hx of     Depression No family hx of     Anxiety Disorder No family hx of     Asthma No family hx of     Thyroid Disease No family hx of     Genetic Disorder No family hx of      Social History     Socioeconomic History    Marital status:    Tobacco Use    Smoking status: Former     Current packs/day: 0.00     Types: Cigarettes     Quit date: 2013     Years since quittin.6     Passive exposure: Never    Smokeless tobacco: Never   Vaping Use    Vaping status: Never Used   Substance and Sexual Activity    Alcohol use: Yes     Comment: 4-5 drinks per wk    Drug use: No    Sexual activity: Yes     Partners: Female     Birth control/protection: Condom, Female Surgical   Other Topics Concern    Parent/sibling w/ CABG, MI or angioplasty before 65F 55M? No     Service No    Blood Transfusions No    Caffeine Concern No    Occupational Exposure No    Hobby Hazards No    Sleep Concern Yes    Stress Concern Yes    Weight Concern Yes    Special Diet No    Back Care No    Exercise Yes     Comment: walking    Bike Helmet No    Seat Belt Yes    Self-Exams No     Social Drivers of Health     Financial Resource Strain: Low Risk  (2024)    Financial Resource Strain     Within the past 12 months, have you or your family members you live with been unable to get utilities (heat, electricity) when it was really needed?: No   Food Insecurity: Low Risk  (2024)    Food Insecurity     Within the past 12 months, did you worry that your food would run out before you got money to buy more?: No     Within the past 12 months,  did the food you bought just not last and you didn t have money to get more?: No   Transportation Needs: Low Risk  (5/2/2024)    Transportation Needs     Within the past 12 months, has lack of transportation kept you from medical appointments, getting your medicines, non-medical meetings or appointments, work, or from getting things that you need?: No   Physical Activity: Insufficiently Active (5/2/2024)    Exercise Vital Sign     Days of Exercise per Week: 3 days     Minutes of Exercise per Session: 30 min   Stress: No Stress Concern Present (5/2/2024)    [place] [place] [employer] - Occupational Stress Questionnaire     Feeling of Stress : Only a little   Social Connections: Unknown (5/2/2024)    Social Connection and Isolation Panel [NHANES]     Frequency of Social Gatherings with Friends and Family: Once a week   Interpersonal Safety: Low Risk  (5/2/2024)    Interpersonal Safety     Do you feel physically and emotionally safe where you currently live?: Yes     Within the past 12 months, have you been hit, slapped, kicked or otherwise physically hurt by someone?: No     Within the past 12 months, have you been humiliated or emotionally abused in other ways by your partner or ex-partner?: No   Housing Stability: Low Risk  (5/2/2024)    Housing Stability     Do you have housing? : Yes     Are you worried about losing your housing?: No       Additional medical/Social/Surgical histories reviewed in [facility] and updated as appropriate.     REVIEW OF SYSTEMS (12/18/2024)  10 point ROS of systems including Constitutional, Eyes, Respiratory, Cardiovascular, Gastroenterology, Genitourinary, Integumentary, Musculoskeletal, Psychiatric, Allergic/Immunologic were all negative except for pertinent positives noted in my HPI.     PHYSICAL EXAM  VSS  Vital Signs: There were no vitals taken for this visit. Patient declined being weighed. There is no height or weight on file to calculate BMI.    General  - normal  appearance, in no obvious distress  HEENT  - conjunctivae not injected, moist mucous membranes, normocephalic/atraumatic head, ears normal appearance, no lesions, mouth normal appearance, no scars, normal dentition and teeth present  CV  - normal peripheral perfusion  Pulm  - normal respiratory pattern, non-labored  Musculoskeletal - lumbar spine  - stance: normal gait without limp, no obvious leg length discrepancy, normal heel and toe walk  - inspection: normal bone and joint alignment, no obvious scoliosis  - palpation: no paravertebral or bony tenderness  - ROM: flexion exacerbates pain, normal extension, sidebending, rotation  - strength: lower extremities 5/5 in all planes  - special tests:  (+) straight leg raise  (+) slump test  Neuro  - patellar and Achilles DTRs 2+ bilaterally, lower extremity sensory deficit throughout L5 distribution, grossly normal coordination, normal muscle tone  Skin  - no ecchymosis, erythema, warmth, or induration, no obvious rash  Psych  - interactive, appropriate, normal mood and affect    ASSESSMENT & PLAN  58 yo male with lumbar ddd, disc herniations, radiculopathy    I independently reviewed the following imaging studies:  Lumbar MRI from 2023: shows ddd, disc herniations  Dsicussed and ordered lumbar MRI for further evaluation  Had lumbar KUNAL last year that improved his symptoms for greater than 3 months by more than 50%  Cont. HEP  Followup after MRI      Appropriate PPE was utilized for prevention of spread of Covid-19.  Parveen Rick MD, [facility]

## 2024-12-18 NOTE — LETTER
12/18/2024      Nando Wolfe  [address]  [place] [address]      Dear Colleague,    Thank you for referring your patient, Nando Wolfe, to the [facility] [place]. Please see a copy of my visit note below.    HISTORY OF PRESENT ILLNESS  Mr. Wolfe is a pleasant 59 year old year old male who presents to clinic today with the following:  What problem are you here for: Follow up for     He will continue to have intermittent episodes of pain and swelling in his right foot/ankle.     How long have you had this problem: Chronic     Have you had any recent imaging of this problem? Xrays/MRI/CT scans:  - MRI of right ankle completed on 7/6/2023  - MRI of lumbar spine completed on 7/6/2023  - XR of right foot completed on 5/30/2023    Have you had treatments for this problem in the past?  -Medications:  Ibuprofen prn  -Physical therapy: Yes, he did attend formal physical therapy but has not continued with HEP due to exercises causing an increase of pain.   -Injections:  Lumbar KUNAL 4/19/2024: the patient reports this injection provided 2.5 months of over 50% decreased pain in his low back but did not help with his right foot pain and swelling.   - Ankle Brace: prn   - Compression stockings prn.     How severe is this problem today? 0-10 scale: 1/10      MEDICAL HISTORY  Patient Active Problem List   Diagnosis     CARDIOVASCULAR SCREENING; LDL GOAL LESS THAN 160     Obese     Back pain with radiation     Elevated blood sugar     DDD (degenerative disc disease), lumbar     Discogenic syndrome, lumbar     Factor 5 Leiden mutation, heterozygous (H)     Chronic bilateral low back pain with right-sided sciatica     Pain in joint, ankle and foot, right     Edema of right lower leg due to peripheral venous insufficiency       Current Outpatient Medications   Medication Sig Dispense Refill     albuterol (PROAIR HFA/PROVENTIL HFA/VENTOLIN HFA) 108 (90 Base) MCG/ACT inhaler Inhale 2  puffs into the lungs every 4 hours as needed for shortness of breath, wheezing or cough Use with spacer 18 g 0     azithromycin (ZITHROMAX) 250 MG tablet 2 tablets the first day, then 1 tablet daily for the next 4 days 6 tablet 0       No Known Allergies    Family History   Problem Relation Age of Onset     Cerebrovascular Disease Maternal Grandfather      Heart Disease Paternal Grandfather      Cerebrovascular Disease Maternal Uncle      Breast Cancer Paternal Aunt      Colon Cancer Paternal Uncle      Diabetes No family hx of      Coronary Artery Disease No family hx of      Hypertension No family hx of      Hyperlipidemia No family hx of      Prostate Cancer No family hx of      Depression No family hx of      Anxiety Disorder No family hx of      Asthma No family hx of      Thyroid Disease No family hx of      Genetic Disorder No family hx of      Social History     Socioeconomic History     Marital status:    Tobacco Use     Smoking status: Former     Current packs/day: 0.00     Types: Cigarettes     Quit date: 2013     Years since quittin.6     Passive exposure: Never     Smokeless tobacco: Never   Vaping Use     Vaping status: Never Used   Substance and Sexual Activity     Alcohol use: Yes     Comment: 4-5 drinks per wk     Drug use: No     Sexual activity: Yes     Partners: Female     Birth control/protection: Condom, Female Surgical   Other Topics Concern     Parent/sibling w/ CABG, MI or angioplasty before 65F 55M? No      Service No     Blood Transfusions No     Caffeine Concern No     Occupational Exposure No     Hobby Hazards No     Sleep Concern Yes     Stress Concern Yes     Weight Concern Yes     Special Diet No     Back Care No     Exercise Yes     Comment: walking     Bike Helmet No     Seat Belt Yes     Self-Exams No     Social Drivers of Health     Financial Resource Strain: Low Risk  (2024)    Financial Resource Strain      Within the past 12 months, have you or your  family members you live with been unable to get utilities (heat, electricity) when it was really needed?: No   Food Insecurity: Low Risk  (5/2/2024)    Food Insecurity      Within the past 12 months, did you worry that your food would run out before you got money to buy more?: No      Within the past 12 months, did the food you bought just not last and you didn t have money to get more?: No   Transportation Needs: Low Risk  (5/2/2024)    Transportation Needs      Within the past 12 months, has lack of transportation kept you from medical appointments, getting your medicines, non-medical meetings or appointments, work, or from getting things that you need?: No   Physical Activity: Insufficiently Active (5/2/2024)    Exercise Vital Sign      Days of Exercise per Week: 3 days      Minutes of Exercise per Session: 30 min   Stress: No Stress Concern Present (5/2/2024)    [employer] [place] [employer] - Occupational Stress Questionnaire      Feeling of Stress : Only a little   Social Connections: Unknown (5/2/2024)    Social Connection and Isolation Panel [NHANES]      Frequency of Social Gatherings with Friends and Family: Once a week   Interpersonal Safety: Low Risk  (5/2/2024)    Interpersonal Safety      Do you feel physically and emotionally safe where you currently live?: Yes      Within the past 12 months, have you been hit, slapped, kicked or otherwise physically hurt by someone?: No      Within the past 12 months, have you been humiliated or emotionally abused in other ways by your partner or ex-partner?: No   Housing Stability: Low Risk  (5/2/2024)    Housing Stability      Do you have housing? : Yes      Are you worried about losing your housing?: No       Additional medical/Social/Surgical histories reviewed in [facility] and updated as appropriate.     REVIEW OF SYSTEMS (12/18/2024)  10 point ROS of systems including Constitutional, Eyes, Respiratory, Cardiovascular, Gastroenterology, Genitourinary,  Integumentary, Musculoskeletal, Psychiatric, Allergic/Immunologic were all negative except for pertinent positives noted in my HPI.     PHYSICAL EXAM  VSS  Vital Signs: There were no vitals taken for this visit. Patient declined being weighed. There is no height or weight on file to calculate BMI.    General  - normal appearance, in no obvious distress  HEENT  - conjunctivae not injected, moist mucous membranes, normocephalic/atraumatic head, ears normal appearance, no lesions, mouth normal appearance, no scars, normal dentition and teeth present  CV  - normal peripheral perfusion  Pulm  - normal respiratory pattern, non-labored  Musculoskeletal - lumbar spine  - stance: normal gait without limp, no obvious leg length discrepancy, normal heel and toe walk  - inspection: normal bone and joint alignment, no obvious scoliosis  - palpation: no paravertebral or bony tenderness  - ROM: flexion exacerbates pain, normal extension, sidebending, rotation  - strength: lower extremities 5/5 in all planes  - special tests:  (+) straight leg raise  (+) slump test  Neuro  - patellar and Achilles DTRs 2+ bilaterally, lower extremity sensory deficit throughout L5 distribution, grossly normal coordination, normal muscle tone  Skin  - no ecchymosis, erythema, warmth, or induration, no obvious rash  Psych  - interactive, appropriate, normal mood and affect    ASSESSMENT & PLAN  58 yo male with lumbar ddd, disc herniations, radiculopathy    I independently reviewed the following imaging studies:  Lumbar MRI from 2023: shows ddd, disc herniations  Dsicussed and ordered lumbar MRI for further evaluation  Had lumbar KUNAL last year that improved his symptoms for greater than 3 months by more than 50%  Cont. HEP  Followup after MRI      Appropriate PPE was utilized for prevention of spread of Covid-19.  Parveen Rick MD, CA[facility]      Again, thank you for allowing me to participate in the care of your patient.        Sincerely,    Parveen Funez  MD Pr[facility]    Electronically signed

## 2025-01-14 ENCOUNTER — ANCILLARY PROCEDURE (OUTPATIENT)
Dept: MRI IMAGING | Facility: CLINIC | Age: 60
End: 2025-01-14
Attending: PREVENTIVE MEDICINE
Payer: COMMERCIAL

## 2025-01-14 DIAGNOSIS — M51.362 DEGENERATION OF INTERVERTEBRAL DISC OF LUMBAR REGION WITH DISCOGENIC BACK PAIN AND LOWER EXTREMITY PAIN: ICD-10-CM

## 2025-01-14 DIAGNOSIS — G89.29 CHRONIC RADICULAR LUMBAR PAIN: ICD-10-CM

## 2025-01-14 DIAGNOSIS — M51.16 LUMBAR DISC HERNIATION WITH RADICULOPATHY: ICD-10-CM

## 2025-01-14 DIAGNOSIS — M54.16 CHRONIC RADICULAR LUMBAR PAIN: ICD-10-CM

## 2025-01-14 PROCEDURE — 72148 MRI LUMBAR SPINE W/O DYE: CPT | Performed by: RADIOLOGY

## 2025-01-22 ENCOUNTER — MYC MEDICAL ADVICE (OUTPATIENT)
Dept: ORTHOPEDICS | Facility: CLINIC | Age: 60
End: 2025-01-22
Payer: COMMERCIAL

## 2025-01-29 ENCOUNTER — VIRTUAL VISIT (OUTPATIENT)
Dept: ORTHOPEDICS | Facility: CLINIC | Age: 60
End: 2025-01-29
Attending: PREVENTIVE MEDICINE
Payer: COMMERCIAL

## 2025-01-29 DIAGNOSIS — M54.16 CHRONIC RADICULAR LUMBAR PAIN: ICD-10-CM

## 2025-01-29 DIAGNOSIS — M51.16 LUMBAR DISC HERNIATION WITH RADICULOPATHY: Primary | ICD-10-CM

## 2025-01-29 DIAGNOSIS — G89.29 CHRONIC RADICULAR LUMBAR PAIN: ICD-10-CM

## 2025-01-29 PROCEDURE — 98014 SYNCH AUDIO-ONLY EST MOD 30: CPT | Performed by: PREVENTIVE MEDICINE

## 2025-01-29 NOTE — PROGRESS NOTES
Jim is a 59 year old who is being evaluated via a billable telephone visit.    What phone number would you like to be contacted at? listed  How would you like to obtain your AVS? Chichi  Originating Location (pt. Location): Home    Distant Location (provider location):  On-site  Telephone visit completed due to the patient did not consent to a video visit.  Phone call duration: 20 minutes  Patient is a  59   year old who is being evaluated via a billable telephone visit.      What phone number would you like to be contacted at? CELL  How would you like to obtain your AVS? CHICHI        Subjective   Patient is a  59   year old who presents by phone call visit for the following:     HPI   Followup for lumbar MRI  Overall feels about the same  Still having some low back pain and right foot pain and numbness  Wants to review MRI and discuss possible KUNAL    Review of Systems   Constitutional, HEENT, cardiovascular, pulmonary, gi and gu systems are negative, except as otherwise noted.      Objective           Vitals:  No vitals were obtained today due to virtual visit.    Physical Exam   healthy, alert, and no distress  PSYCH: Alert and oriented times 3; coherent speech, normal   rate and volume, able to articulate logical thoughts, able   to abstract reason, no tangential thoughts, no hallucinations   or delusions  His affect is normal  RESP: No cough, no audible wheezing, able to talk in full sentences  Remainder of exam unable to be completed due to telephone visits    Assessment/Plan  60 yo male with lumbar ddd, disc herniations, radiculpathy, right foot pain and numbness, not resolved    I independently reviewed the following imaging studies and discussed with patient:  Lumbar MRI shows ddd, disc herniations  Discussed and ordered KUNAL   Followup after KUNAL          Phone call duration: 20 minutes  Phone call start: 100pm  Phone call end: 120pm  Dr Rick

## 2025-01-29 NOTE — LETTER
1/29/2025      Nando Wolfe  [address]  [place] [address]      Dear Colleague,    Thank you for referring your patient, Nando Wolfe, to the [facility] [place]. Please see a copy of my visit note below.    Jim is a 59 year old who is being evaluated via a billable telephone visit.    What phone number would you like to be contacted at? listed  How would you like to obtain your AVS? Chichi  Originating Location (pt. Location): Home  {PROVIDER LOCATION On-site should be selected for visits conducted from your clinic location or adjoining [facility] hospital, academic office, or other nearby [facility] building. Off-site should be selected for all other provider locations, including home:[id number]}  Distant Location (provider location):  On-site  Telephone visit completed due to the patient did not consent to a video visit.  Phone call duration: 20 minutes  Patient is a  59   year old who is being evaluated via a billable telephone visit.      What phone number would you like to be contacted at? CELL  How would you like to obtain your AVS? CHICHI        Subjective   Patient is a  59   year old who presents by phone call visit for the following:     HPI   Followup for lumbar MRI  Overall feels about the same  Still having some low back pain and right foot pain and numbness  Wants to review MRI and discuss possible KUNAL    Review of Systems   Constitutional, HEENT, cardiovascular, pulmonary, gi and gu systems are negative, except as otherwise noted.      Objective           Vitals:  No vitals were obtained today due to virtual visit.    Physical Exam   healthy, alert, and no distress  PSYCH: Alert and oriented times 3; coherent speech, normal   rate and volume, able to articulate logical thoughts, able   to abstract reason, no tangential thoughts, no hallucinations   or delusions  His affect is normal  RESP: No cough, no audible wheezing, able to talk in full  sentences  Remainder of exam unable to be completed due to telephone visits    Assessment/Plan  58 yo male with lumbar ddd, disc herniations, radiculpathy, right foot pain and numbness, not resolved    I independently reviewed the following imaging studies and discussed with patient:  Lumbar MRI shows ddd, disc herniations  Discussed and ordered KUNAL   Followup after KUNAL          Phone call duration: 20 minutes  Phone call start: 100pm  Phone call end: 120pm  Dr Rick      Again, thank you for allowing me to participate in the care of your patient.        Sincerely,        Parveen Rick MD    Electronically signed

## 2025-01-29 NOTE — LETTER
1/29/2025      Nando Wolfe  [address]  [place] [address]      Dear Colleague,    Thank you for referring your patient, Nando Wolfe, to the [facility] [place]. Please see a copy of my visit note below.    Jim is a 59 year old who is being evaluated via a billable telephone visit.    What phone number would you like to be contacted at? listed  How would you like to obtain your AVS? Chichi  Originating Location (pt. Location): Home  {PROVIDER LOCATION On-site should be selected for visits conducted from your clinic location or adjoining [facility] hospital, academic office, or other nearby [facility] building. Off-site should be selected for all other provider locations, including home:[id number]}  Distant Location (provider location):  On-site  Telephone visit completed due to the patient did not consent to a video visit.  Phone call duration: 20 minutes  Patient is a  59   year old who is being evaluated via a billable telephone visit.      What phone number would you like to be contacted at? CELL  How would you like to obtain your AVS? CHICHI        Subjective   Patient is a  59   year old who presents by phone call visit for the following:     HPI   Followup for lumbar MRI  Overall feels about the same  Still having some low back pain and right foot pain and numbness  Wants to review MRI and discuss possible KUNAL    Review of Systems   Constitutional, HEENT, cardiovascular, pulmonary, gi and gu systems are negative, except as otherwise noted.      Objective           Vitals:  No vitals were obtained today due to virtual visit.    Physical Exam   healthy, alert, and no distress  PSYCH: Alert and oriented times 3; coherent speech, normal   rate and volume, able to articulate logical thoughts, able   to abstract reason, no tangential thoughts, no hallucinations   or delusions  His affect is normal  RESP: No cough, no audible wheezing, able to talk in full  sentences  Remainder of exam unable to be completed due to telephone visits    Assessment/Plan  58 yo male with lumbar ddd, disc herniations, radiculpathy, right foot pain and numbness, not resolved    I independently reviewed the following imaging studies and discussed with patient:  Lumbar MRI shows ddd, disc herniations  Discussed and ordered KUNAL   Followup after KUNAL          Phone call duration: 20 minutes  Phone call start: 100pm  Phone call end: 120pm  Dr Rick      Again, thank you for allowing me to participate in the care of your patient.        Sincerely,        Parveen Rick MD    Electronically signed

## 2025-03-07 ENCOUNTER — ANCILLARY PROCEDURE (OUTPATIENT)
Dept: GENERAL RADIOLOGY | Facility: CLINIC | Age: 60
End: 2025-03-07
Attending: PREVENTIVE MEDICINE
Payer: COMMERCIAL

## 2025-03-07 DIAGNOSIS — G89.29 CHRONIC RADICULAR LUMBAR PAIN: ICD-10-CM

## 2025-03-07 DIAGNOSIS — M51.16 LUMBAR DISC HERNIATION WITH RADICULOPATHY: ICD-10-CM

## 2025-03-07 DIAGNOSIS — M54.16 CHRONIC RADICULAR LUMBAR PAIN: ICD-10-CM

## 2025-03-07 PROCEDURE — 62323 NJX INTERLAMINAR LMBR/SAC: CPT | Performed by: RADIOLOGY

## 2025-03-07 RX ORDER — IOPAMIDOL 408 MG/ML
2 INJECTION, SOLUTION INTRATHECAL ONCE
Status: COMPLETED | OUTPATIENT
Start: 2025-03-07 | End: 2025-03-07

## 2025-03-07 RX ORDER — LIDOCAINE HYDROCHLORIDE 10 MG/ML
5 INJECTION, SOLUTION INFILTRATION; PERINEURAL ONCE
Status: COMPLETED | OUTPATIENT
Start: 2025-03-07 | End: 2025-03-07

## 2025-03-07 RX ORDER — BUPIVACAINE HYDROCHLORIDE 5 MG/ML
2 INJECTION, SOLUTION PERINEURAL ONCE
Status: COMPLETED | OUTPATIENT
Start: 2025-03-07 | End: 2025-03-07

## 2025-03-07 RX ORDER — METHYLPREDNISOLONE ACETATE 80 MG/ML
80 INJECTION, SUSPENSION INTRA-ARTICULAR; INTRALESIONAL; INTRAMUSCULAR; SOFT TISSUE ONCE
Status: COMPLETED | OUTPATIENT
Start: 2025-03-07 | End: 2025-03-07

## 2025-03-07 RX ADMIN — LIDOCAINE HYDROCHLORIDE 5 ML: 10 INJECTION, SOLUTION INFILTRATION; PERINEURAL at 15:51

## 2025-03-07 RX ADMIN — METHYLPREDNISOLONE ACETATE 80 MG: 80 INJECTION, SUSPENSION INTRA-ARTICULAR; INTRALESIONAL; INTRAMUSCULAR; SOFT TISSUE at 15:50

## 2025-03-07 RX ADMIN — BUPIVACAINE HYDROCHLORIDE 10 MG: 5 INJECTION, SOLUTION PERINEURAL at 15:50

## 2025-03-07 RX ADMIN — IOPAMIDOL 2 ML: 408 INJECTION, SOLUTION INTRATHECAL at 15:51

## 2025-03-07 NOTE — PROGRESS NOTES
AFTER YOU GO HOME    DO relax; minimize your activity for 24 hours  You may resume normal activity tomorrow  You may remove the bandage in the evening or next morning  You may resume bathing the next day  Drink at least 4 extra glasses of fluid today if not on fluid restrictions  DO NOT drive or operate machinery at home or at work for at least 24 hours      VISIT THE EMERGENCY ROOM OR URGENT CARE IF:    There is redness or swelling at the injection site  There is discharge from the injection site  You develop a temperature of 101  F or greater      ADDITIONAL INSTRUCTIONS:     You may resume your Coumadin or other blood thinner at your regular dose today.  Follow up with your physician to have your INR rechecked if indicated.  If you gain no relief from the injection after two (2) weeks, follow-up with your provider for your options.        Contacts:    During business hours from 8 to 5 pm, you may call [phone] to reach a nurse advisor at [facility].  After hours, call [facility]  [phone].  Ask for the Radiologist on-call.  Someone is on-call 24 hrs/day.  [facility] Toll Free Number   [phone]

## 2025-03-07 NOTE — PROGRESS NOTES
Jim was seen in X-ray today for a epidural injection. Patient rated pain before procedure 1-2/10. After procedure patient rated pain 0/10.   This pain level is acceptable to patient. Patient discharged home with .

## 2025-03-18 ENCOUNTER — MYC MEDICAL ADVICE (OUTPATIENT)
Dept: ORTHOPEDICS | Facility: CLINIC | Age: 60
End: 2025-03-18
Payer: COMMERCIAL

## 2025-03-19 ENCOUNTER — VIRTUAL VISIT (OUTPATIENT)
Dept: ORTHOPEDICS | Facility: CLINIC | Age: 60
End: 2025-03-19
Attending: PREVENTIVE MEDICINE
Payer: COMMERCIAL

## 2025-03-19 DIAGNOSIS — M51.16 LUMBAR DISC HERNIATION WITH RADICULOPATHY: ICD-10-CM

## 2025-03-19 DIAGNOSIS — M19.071 ARTHRITIS OF RIGHT ANKLE: Primary | ICD-10-CM

## 2025-03-19 PROCEDURE — 98013 SYNCH AUDIO-ONLY EST LOW 20: CPT | Performed by: PREVENTIVE MEDICINE

## 2025-03-19 NOTE — LETTER
3/19/2025      Nando Wolfe  [address]  [place] [facility] [place] [facility] [address]      Dear Colleague,    Thank you for referring your patient, Nando Wolfe, to the [facility] [place]. Please see a copy of my visit note below.    Jim is a 59 year old who is being evaluated via a billable telephone visit.    What phone number would you like to be contacted at? listed  How would you like to obtain your AVS? Chichi  Originating Location (pt. Location): Home  {PROVIDER LOCATION On-site should be selected for visits conducted from your clinic location or adjoining [facility] hospital, academic office, or other nearby [facility] building. Off-site should be selected for all other provider locations, including home:[id number]}  Distant Location (provider location):  On-site  Telephone visit completed due to the patient did not consent to a video visit.  Phone call duration: 20 minutes  Patient is a   59  year old who is being evaluated via a billable telephone visit.      What phone number would you like to be contacted at? CELL  How would you like to obtain your AVS? CHICHI        Subjective   Patient is a   59  year old who presents by phone call visit for the following:     HPI   Followup for lumbar Epidural injection  Has improved his pain and symptoms over the past week or so, but his foot continues to bother him   More towards the end of the day with sitting in the car  He feels like some of his foot pain might be arthritis in his joints      Review of Systems   Constitutional, HEENT, cardiovascular, pulmonary, gi and gu systems are negative, except as otherwise noted.      Objective           Vitals:  No vitals were obtained today due to virtual visit.    Physical Exam   healthy, alert, and no distress  PSYCH: Alert and oriented times 3; coherent speech, normal   rate and volume, able to articulate logical thoughts, able   to abstract reason, no tangential thoughts, no hallucinations   or  delusions  His affect is normal  RESP: No cough, no audible wheezing, able to talk in full sentences  Remainder of exam unable to be completed due to telephone visits    Assessment/Plan  58 yo male with lumbar radiculopathy, disc herniations, improved, not resolved    I independently reviewed the following imaging studies and discussed with patient:  Lumbar MRI shows ddd, disc herniations  Discussed his foot pain and can consider foot joint injection in midfoot in 1 month at follow up visit if continues          Phone call duration: 20 minutes  Phone call start: 400pm  Phone call end: 420pm  Dr Rick      Again, thank you for allowing me to participate in the care of your patient.        Sincerely,        Parveen Rick MD    Electronically signed

## 2025-03-19 NOTE — LETTER
3/19/2025      Nando Wolfe  [address]  [place] [address]      Dear Colleague,    Thank you for referring your patient, Nando Wolfe, to the [facility] [place]. Please see a copy of my visit note below.    Jim is a 59 year old who is being evaluated via a billable telephone visit.    What phone number would you like to be contacted at? listed  How would you like to obtain your AVS? Chichi  Originating Location (pt. Location): Home  {PROVIDER LOCATION On-site should be selected for visits conducted from your clinic location or adjoining [facility] hospital, academic office, or other nearby [facility] building. Off-site should be selected for all other provider locations, including home:[id number]}  Distant Location (provider location):  On-site  Telephone visit completed due to the patient did not consent to a video visit.  Phone call duration: 20 minutes  Patient is a   59  year old who is being evaluated via a billable telephone visit.      What phone number would you like to be contacted at? CELL  How would you like to obtain your AVS? CHICHI        Subjective   Patient is a   59  year old who presents by phone call visit for the following:     HPI   Followup for lumbar Epidural injection  Has improved his pain and symptoms over the past week or so, but his foot continues to bother him   More towards the end of the day with sitting in the car  He feels like some of his foot pain might be arthritis in his joints      Review of Systems   Constitutional, HEENT, cardiovascular, pulmonary, gi and gu systems are negative, except as otherwise noted.      Objective           Vitals:  No vitals were obtained today due to virtual visit.    Physical Exam   healthy, alert, and no distress  PSYCH: Alert and oriented times 3; coherent speech, normal   rate and volume, able to articulate logical thoughts, able   to abstract reason, no tangential thoughts, no hallucinations   or  delusions  His affect is normal  RESP: No cough, no audible wheezing, able to talk in full sentences  Remainder of exam unable to be completed due to telephone visits    Assessment/Plan  58 yo male with lumbar radiculopathy, disc herniations, improved, not resolved    I independently reviewed the following imaging studies and discussed with patient:  Lumbar MRI shows ddd, disc herniations  Discussed his foot pain and can consider foot joint injection in midfoot in 1 month at follow up visit if continues          Phone call duration: 20 minutes  Phone call start: 400pm  Phone call end: 420pm  Dr Rick      Again, thank you for allowing me to participate in the care of your patient.        Sincerely,        Parveen Rick MD    Electronically signed

## 2025-03-19 NOTE — PROGRESS NOTES
Ijm is a 59 year old who is being evaluated via a billable telephone visit.    What phone number would you like to be contacted at? listed  How would you like to obtain your AVS? Chichi  Originating Location (pt. Location): Home    Distant Location (provider location):  On-site  Telephone visit completed due to the patient did not consent to a video visit.  Phone call duration: 20 minutes  Patient is a   59  year old who is being evaluated via a billable telephone visit.      What phone number would you like to be contacted at? CELL  How would you like to obtain your AVS? CHICHI        Subjective   Patient is a   59  year old who presents by phone call visit for the following:     HPI   Followup for lumbar Epidural injection  Has improved his pain and symptoms over the past week or so, but his foot continues to bother him   More towards the end of the day with sitting in the car  He feels like some of his foot pain might be arthritis in his joints      Review of Systems   Constitutional, HEENT, cardiovascular, pulmonary, gi and gu systems are negative, except as otherwise noted.      Objective           Vitals:  No vitals were obtained today due to virtual visit.    Physical Exam   healthy, alert, and no distress  PSYCH: Alert and oriented times 3; coherent speech, normal   rate and volume, able to articulate logical thoughts, able   to abstract reason, no tangential thoughts, no hallucinations   or delusions  His affect is normal  RESP: No cough, no audible wheezing, able to talk in full sentences  Remainder of exam unable to be completed due to telephone visits    Assessment/Plan  58 yo male with lumbar radiculopathy, disc herniations, improved, not resolved    I independently reviewed the following imaging studies and discussed with patient:  Lumbar MRI shows ddd, disc herniations  Discussed his foot pain and can consider foot joint injection in midfoot in 1 month at follow up visit if continues          Phone  call duration: 20 minutes  Phone call start: 400pm  Phone call end: 420pm  Dr Rick

## 2025-04-02 ENCOUNTER — PATIENT OUTREACH (OUTPATIENT)
Dept: CARE COORDINATION | Facility: CLINIC | Age: 60
End: 2025-04-02
Payer: COMMERCIAL

## 2025-04-16 ENCOUNTER — PATIENT OUTREACH (OUTPATIENT)
Dept: CARE COORDINATION | Facility: CLINIC | Age: 60
End: 2025-04-16
Payer: COMMERCIAL

## 2025-05-14 ENCOUNTER — OFFICE VISIT (OUTPATIENT)
Dept: ORTHOPEDICS | Facility: CLINIC | Age: 60
End: 2025-05-14
Payer: COMMERCIAL

## 2025-05-14 DIAGNOSIS — M25.571 PAIN AND SWELLING OF ANKLE, RIGHT: ICD-10-CM

## 2025-05-14 DIAGNOSIS — M54.16 CHRONIC RADICULAR LUMBAR PAIN: ICD-10-CM

## 2025-05-14 DIAGNOSIS — M51.16 LUMBAR DISC HERNIATION WITH RADICULOPATHY: ICD-10-CM

## 2025-05-14 DIAGNOSIS — M25.571 CHRONIC PAIN OF RIGHT ANKLE: ICD-10-CM

## 2025-05-14 DIAGNOSIS — G89.29 CHRONIC RADICULAR LUMBAR PAIN: ICD-10-CM

## 2025-05-14 DIAGNOSIS — M25.471 PAIN AND SWELLING OF ANKLE, RIGHT: ICD-10-CM

## 2025-05-14 DIAGNOSIS — M19.071 ARTHRITIS OF RIGHT ANKLE: Primary | ICD-10-CM

## 2025-05-14 DIAGNOSIS — G89.29 CHRONIC PAIN OF RIGHT ANKLE: ICD-10-CM

## 2025-05-14 RX ADMIN — LIDOCAINE HYDROCHLORIDE 3 ML: 10 INJECTION, SOLUTION INFILTRATION; PERINEURAL at 16:02

## 2025-05-14 RX ADMIN — METHYLPREDNISOLONE ACETATE 40 MG: 40 INJECTION, SUSPENSION INTRA-ARTICULAR; INTRALESIONAL; INTRAMUSCULAR; SOFT TISSUE at 16:02

## 2025-05-14 NOTE — NURSING NOTE
[address] [address]  Dept: [phone]  ______________________________________________________________________________    Patient: Nando Wolfe   : 1965   MRN: [id number]   May 14, 2025    INVASIVE PROCEDURE SAFETY CHECKLIST    Date: May 14, 2025   Procedure: right ankle joint injection with depo   Patient Name: Nando Wolfe  MRN: [id number]  YOB: 1965    Action: Complete sections as appropriate. Any discrepancy results in a HARD COPY until resolved.     PRE PROCEDURE:  Patient ID verified with 2 identifiers (name and  or MRN): Yes  Procedure and site verified with patient/designee (when able): Yes  Accurate consent documentation in medical record: Yes  H&P (or appropriate assessment) documented in medical record: Yes  H&P must be up to 20 days prior to procedure and updates within 24 hours of procedure as applicable: Yes  Relevant diagnostic and radiology test results appropriately labeled and displayed as applicable: NA  Procedure site(s) marked with provider initials: NA    TIMEOUT:  Time-Out performed immediately prior to starting procedure, including verbal and active participation of all team members addressing the following:Yes  * Correct patient identify  * Confirmed that the correct side and site are marked  * An accurate procedure consent form  * Agreement on the procedure to be done  * Correct patient position  * Relevant images and results are properly labeled and appropriately displayed  * The need to administer antibiotics or fluids for irrigation purposes during the procedure as applicable   * Safety precautions based on patient history or medication use    DURING PROCEDURE: Verification of correct person, site, and procedures any time the responsibility for care of the patient is transferred to another member of the care team.       Prior to injection, verified patient identity using patient's name and  date of birth.  Due to injection administration, patient instructed to remain in clinic for 15 minutes  afterwards, and to report any adverse reaction to me immediately.    Joint injection was performed.      Depo   Drug Amount Wasted:  None.  Vial/Syringe: Single dose vial  Expiration Date:  11/01/2026    Ramandeep Resendiz, ATC  May 14, 2025

## 2025-05-14 NOTE — PROGRESS NOTES
HISTORY OF PRESENT ILLNESS  Mr. Wolfe is a pleasant 59 year old year old male who presents to clinic today with the following:    What problem are you here for: Follow up for right sided low back radicular pain and swelling.     How long have you had this problem: Chronic     Have you had any recent imaging of this problem? Xrays/MRI/CT scans:  - MRI of lumbar spine completed on 1/14/2025  - XR of right ankle completed on 7/6/2023    Have you had treatments for this problem in the past?  -Medications: None   -Physical therapy: None   -Injections:  Lumbar KUNAL 3/7/2025: he reports this injection provided over 6 weeks of over 50% decreased pain in his low back and in his right ankle and leg and is still helping now      Right Ankle Joint CSI 9/13/2023  - Ankle Brace prn.   - Compression stockings prn due to previous DVT in his left lower leg.   His right ankle has been more swollen lately and more sore  Previously had an ankle injection that improved his pain and swelling over 75% for more than 3 month    How severe is this problem today? 0-10 scale: 2/10    MEDICAL HISTORY  Patient Active Problem List   Diagnosis    CARDIOVASCULAR SCREENING; LDL GOAL LESS THAN 160    Obese    Back pain with radiation    Elevated blood sugar    DDD (degenerative disc disease), lumbar    Discogenic syndrome, lumbar    Factor 5 Leiden mutation, heterozygous    Chronic bilateral low back pain with right-sided sciatica    Pain in joint, ankle and foot, right    Edema of right lower leg due to peripheral venous insufficiency       Current Outpatient Medications   Medication Sig Dispense Refill    albuterol (PROAIR HFA/PROVENTIL HFA/VENTOLIN HFA) 108 (90 Base) MCG/ACT inhaler Inhale 2 puffs into the lungs every 4 hours as needed for shortness of breath, wheezing or cough Use with spacer 18 g 0    azithromycin (ZITHROMAX) 250 MG tablet 2 tablets the first day, then 1 tablet daily for the next 4 days (Patient not taking: Reported on  2024) 6 tablet 0       No Known Allergies    Family History   Problem Relation Age of Onset    Cerebrovascular Disease Maternal Grandfather     Heart Disease Paternal Grandfather     Cerebrovascular Disease Maternal Uncle     Breast Cancer Paternal Aunt     Colon Cancer Paternal Uncle     Diabetes No family hx of     Coronary Artery Disease No family hx of     Hypertension No family hx of     Hyperlipidemia No family hx of     Prostate Cancer No family hx of     Depression No family hx of     Anxiety Disorder No family hx of     Asthma No family hx of     Thyroid Disease No family hx of     Genetic Disorder No family hx of      Social History     Socioeconomic History    Marital status:    Tobacco Use    Smoking status: Former     Current packs/day: 0.00     Types: Cigarettes     Quit date: 2013     Years since quittin.0     Passive exposure: Never    Smokeless tobacco: Never   Vaping Use    Vaping status: Never Used   Substance and Sexual Activity    Alcohol use: Yes     Comment: 4-5 drinks per wk    Drug use: No    Sexual activity: Yes     Partners: Female     Birth control/protection: Condom, Female Surgical   Other Topics Concern    Parent/sibling w/ CABG, MI or angioplasty before 65F 55M? No     Service No    Blood Transfusions No    Caffeine Concern No    Occupational Exposure No    Hobby Hazards No    Sleep Concern Yes    Stress Concern Yes    Weight Concern Yes    Special Diet No    Back Care No    Exercise Yes     Comment: walking    Bike Helmet No    Seat Belt Yes    Self-Exams No     Social Drivers of Health     Financial Resource Strain: Low Risk  (2024)    Financial Resource Strain     Within the past 12 months, have you or your family members you live with been unable to get utilities (heat, electricity) when it was really needed?: No   Food Insecurity: Low Risk  (2024)    Food Insecurity     Within the past 12 months, did you worry that your food would run out  before you got money to buy more?: No     Within the past 12 months, did the food you bought just not last and you didn t have money to get more?: No   Transportation Needs: Low Risk  (5/2/2024)    Transportation Needs     Within the past 12 months, has lack of transportation kept you from medical appointments, getting your medicines, non-medical meetings or appointments, work, or from getting things that you need?: No   Physical Activity: Insufficiently Active (5/2/2024)    Exercise Vital Sign     Days of Exercise per Week: 3 days     Minutes of Exercise per Session: 30 min   Stress: No Stress Concern Present (5/2/2024)    [employer] - Occupational Stress Questionnaire     Feeling of Stress : Only a little   Social Connections: Unknown (5/2/2024)    Social Connection and Isolation Panel [NHANES]     Frequency of Social Gatherings with Friends and Family: Once a week   Interpersonal Safety: Low Risk  (5/2/2024)    Interpersonal Safety     Do you feel physically and emotionally safe where you currently live?: Yes     Within the past 12 months, have you been hit, slapped, kicked or otherwise physically hurt by someone?: No     Within the past 12 months, have you been humiliated or emotionally abused in other ways by your partner or ex-partner?: No   Housing Stability: Low Risk  (5/2/2024)    Housing Stability     Do you have housing? : Yes     Are you worried about losing your housing?: No       Additional medical/Social/S[facility] and updated as appropriate.     REVIEW OF SYSTEMS (5/14/2025)  10 point ROS of systems including Constitutional, Eyes, Respiratory, Cardiovascular, Gastroenterology, Genitourinary, Integumentary, Musculoskeletal, Psychiatric, Allergic/Immunologic were all negative except for pertinent positives noted in my HPI.     PHYSICAL EXAM  VSS  General  - normal appearance, in no obvious distress  HEENT  - conjunctivae not injected, moist mucous  membranes, normocephalic/atraumatic head, ears normal appearance, no lesions, mouth normal appearance, no scars, normal dentition and teeth present  CV  - normal pulses at posterior tib and dorsalis pedis  Pulm  - normal respiratory pattern, non-labored  Musculoskeletal -right  ankle  - stance: gait very slightly  favors affected side, NOT reluctant to bear weight  - inspection: moderate swelling anteriorly, medially, laterally, normal bone and joint alignment, no obvious deformity  - palpation: tenderness over Anterior/medial ankle joint, no tenderness over lateral or medial malleoli, navicular, or base of 5th MT  - ROM: intact globally but limited secondary to pain  - strength: 4/5 in eversion, 5/5 in all other planes  - special tests:  Some pain with inversion stress  (-) anterior drawer  (-) talar tilt  (-) Tinel's  (-) squeeze test  (-) Brewster test  Neuro  - no sensory or motor deficit, grossly normal coordination, normal muscle tone  Skin  - ecchymosis overlying lateral foot-ankle junction, no warmth or induration, no obvious rash  Psych  - interactive, appropriate, normal mood and affect   Lumbar: has some discomfort with lumbar extension, flexion, and positive SLR on right    ASSESSMENT & PLAN  60 yo male with lumbar ddd, disc herniations, radiculopathy, not resolved, and right ankle arthritis, swelling, worse    I independently reviewed the following imaging studies:  Right ankle xray: shows some arthritis  Right ankle MRI shows arthritis and cartilage loss medial talus in joint  Lumbar MRI shows ddd, disc herniations  After a 20 minute discussion and examination, we decided to perform a same day injection for diagnostic and therapeutic purposes for  Right ankle joint  Discussed cont. HEP and followup in a few weeks to discuss possible KUNAL treatment vs. Surgical referral for lumbar spine      Patient has been doing home exercise physical therapy program for this problem      Parveen Rick MD,  CAQSM    PROCEDURE    Right Ankle Injection - Ultrasound Guided  The patient was informed of the risks and the benefits of the procedure and a written consent was signed.  The patient s right  ankle was prepped with chlorhexidine in sterile fashion.   40 mg of triamcinolone suspension was drawn up into a 5 mL syringe with 2 mL of 1% lidocaine.  Injection was performed using sterile technique.  Under ultrasound guidance a 1.5-inch 22-gauge needle was used to enter the anterior aspect of the right ankle at the tibiotalar joint.  Needle placement was visualized and documented with ultrasound.  Needle was placed just medial to the anterior tibialis tendon in long axis to the probe. Ultrasound visualization was necessary due to decreased joint space in the setting of osteoarthritis.  Images were permanently stored for the patient's record.  There were no complications. The patient tolerated the procedure well. There was negligible bleeding.        Medium Joint Injection: R ankle    Date/Time: 5/14/2025 4:02 PM    Performed by: Parveen Rick MD  Authorized by: Parveen Rick MD    Indications:  Pain, joint swelling and diagnostic evaluation  Needle Size:  22 G  Guidance: ultrasound    Approach:  Anteromedial  Location:  Ankle  Site:  R ankle  Medications:  40 mg methylPREDNISolone 40 MG/ML; 3 mL lidocaine 1 %  Outcome:  Tolerated well, no immediate complications  Procedure discussed: discussed risks, benefits, and alternatives    Consent Given by:  Patient  Timeout: timeout called immediately prior to procedure    Prep: patient was prepped and draped in usual sterile fashion

## 2025-05-14 NOTE — LETTER
5/14/2025      Nando Wolfe  [address]  [place] [address]      Dear Colleague,    Thank you for referring your patient, Nando Wolfe, to the [facility] [place]. Please see a copy of my visit note below.    HISTORY OF PRESENT ILLNESS  Mr. Wolfe is a pleasant 59 year old year old male who presents to clinic today with the following:    What problem are you here for: Follow up for right sided low back radicular pain and swelling.     How long have you had this problem: Chronic     Have you had any recent imaging of this problem? Xrays/MRI/CT scans:  - MRI of lumbar spine completed on 1/14/2025  - XR of right ankle completed on 7/6/2023    Have you had treatments for this problem in the past?  -Medications: None   -Physical therapy: None   -Injections:  Lumbar KUNAL 3/7/2025: he reports this injection provided over 6 weeks of over 50% decreased pain in his low back and in his right ankle and leg and is still helping now      Right Ankle Joint CSI 9/13/2023  - Ankle Brace prn.   - Compression stockings prn due to previous DVT in his left lower leg.   His right ankle has been more swollen lately and more sore  Previously had an ankle injection that improved his pain and swelling over 75% for more than 3 month    How severe is this problem today? 0-10 scale: 2/10    MEDICAL HISTORY  Patient Active Problem List   Diagnosis     CARDIOVASCULAR SCREENING; LDL GOAL LESS THAN 160     Obese     Back pain with radiation     Elevated blood sugar     DDD (degenerative disc disease), lumbar     Discogenic syndrome, lumbar     Factor 5 Leiden mutation, heterozygous     Chronic bilateral low back pain with right-sided sciatica     Pain in joint, ankle and foot, right     Edema of right lower leg due to peripheral venous insufficiency       Current Outpatient Medications   Medication Sig Dispense Refill     albuterol (PROAIR HFA/PROVENTIL HFA/VENTOLIN HFA) 108 (90 Base) MCG/ACT  inhaler Inhale 2 puffs into the lungs every 4 hours as needed for shortness of breath, wheezing or cough Use with spacer 18 g 0     azithromycin (ZITHROMAX) 250 MG tablet 2 tablets the first day, then 1 tablet daily for the next 4 days (Patient not taking: Reported on 2024) 6 tablet 0       No Known Allergies    Family History   Problem Relation Age of Onset     Cerebrovascular Disease Maternal Grandfather      Heart Disease Paternal Grandfather      Cerebrovascular Disease Maternal Uncle      Breast Cancer Paternal Aunt      Colon Cancer Paternal Uncle      Diabetes No family hx of      Coronary Artery Disease No family hx of      Hypertension No family hx of      Hyperlipidemia No family hx of      Prostate Cancer No family hx of      Depression No family hx of      Anxiety Disorder No family hx of      Asthma No family hx of      Thyroid Disease No family hx of      Genetic Disorder No family hx of      Social History     Socioeconomic History     Marital status:    Tobacco Use     Smoking status: Former     Current packs/day: 0.00     Types: Cigarettes     Quit date: 2013     Years since quittin.0     Passive exposure: Never     Smokeless tobacco: Never   Vaping Use     Vaping status: Never Used   Substance and Sexual Activity     Alcohol use: Yes     Comment: 4-5 drinks per wk     Drug use: No     Sexual activity: Yes     Partners: Female     Birth control/protection: Condom, Female Surgical   Other Topics Concern     Parent/sibling w/ CABG, MI or angioplasty before 65F 55M? No      Service No     Blood Transfusions No     Caffeine Concern No     Occupational Exposure No     Hobby Hazards No     Sleep Concern Yes     Stress Concern Yes     Weight Concern Yes     Special Diet No     Back Care No     Exercise Yes     Comment: walking     Bike Helmet No     Seat Belt Yes     Self-Exams No     Social Drivers of Health     Financial Resource Strain: Low Risk  (2024)    Financial  Resource Strain      Within the past 12 months, have you or your family members you live with been unable to get utilities (heat, electricity) when it was really needed?: No   Food Insecurity: Low Risk  (5/2/2024)    Food Insecurity      Within the past 12 months, did you worry that your food would run out before you got money to buy more?: No      Within the past 12 months, did the food you bought just not last and you didn t have money to get more?: No   Transportation Needs: Low Risk  (5/2/2024)    Transportation Needs      Within the past 12 months, has lack of transportation kept you from medical appointments, getting your medicines, non-medical meetings or appointments, work, or from getting things that you need?: No   Physical Activity: Insufficiently Active (5/2/2024)    Exercise Vital Sign      Days of Exercise per Week: 3 days      Minutes of Exercise per Session: 30 min   Stress: No Stress Concern Present (5/2/2024)    [place] [employer] - Occupational Stress Questionnaire      Feeling of Stress : Only a little   Social Connections: Unknown (5/2/2024)    Social Connection and Isolation Panel [NHANES]      Frequency of Social Gatherings with Friends and Family: Once a week   Interpersonal Safety: Low Risk  (5/2/2024)    Interpersonal Safety      Do you feel physically and emotionally safe where you currently live?: Yes      Within the past 12 months, have you been hit, slapped, kicked or otherwise physically hurt by someone?: No      Within the past 12 months, have you been humiliated or emotionally abused in other ways by your partner or ex-partner?: No   Housing Stability: Low Risk  (5/2/2024)    Housing Stability      Do you have housing? : Yes      Are you worried about losing your housing?: No       Additional medical/Social/Surgical histories reviewed in EPIC and updated as appropriate.     REVIEW OF SYSTEMS (5/14/2025)  10 point ROS of systems including Constitutional, Eyes,  Respiratory, Cardiovascular, Gastroenterology, Genitourinary, Integumentary, Musculoskeletal, Psychiatric, Allergic/Immunologic were all negative except for pertinent positives noted in my HPI.     PHYSICAL EXAM  VSS  General  - normal appearance, in no obvious distress  HEENT  - conjunctivae not injected, moist mucous membranes, normocephalic/atraumatic head, ears normal appearance, no lesions, mouth normal appearance, no scars, normal dentition and teeth present  CV  - normal pulses at posterior tib and dorsalis pedis  Pulm  - normal respiratory pattern, non-labored  Musculoskeletal -right  ankle  - stance: gait very slightly  favors affected side, NOT reluctant to bear weight  - inspection: moderate swelling anteriorly, medially, laterally, normal bone and joint alignment, no obvious deformity  - palpation: tenderness over Anterior/medial ankle joint, no tenderness over lateral or medial malleoli, navicular, or base of 5th MT  - ROM: intact globally but limited secondary to pain  - strength: 4/5 in eversion, 5/5 in all other planes  - special tests:  Some pain with inversion stress  (-) anterior drawer  (-) talar tilt  (-) Tinel's  (-) squeeze test  (-) Brewster test  Neuro  - no sensory or motor deficit, grossly normal coordination, normal muscle tone  Skin  - ecchymosis overlying lateral foot-ankle junction, no warmth or induration, no obvious rash  Psych  - interactive, appropriate, normal mood and affect   Lumbar: has some discomfort with lumbar extension, flexion, and positive SLR on right    ASSESSMENT & PLAN  58 yo male with lumbar ddd, disc herniations, radiculopathy, not resolved, and right ankle arthritis, swelling, worse    I independently reviewed the following imaging studies:  Right ankle xray: shows some arthritis  Right ankle MRI shows arthritis and cartilage loss medial talus in joint  Lumbar MRI shows ddd, disc herniations  After a 20 minute discussion and examination, we decided to perform a  same day injection for diagnostic and therapeutic purposes for  Right ankle joint  Discussed cont. HEP and followup in a few weeks to discuss possible KUNAL treatment vs. Surgical referral for lumbar spine      Patient has been doing home exercise physical therapy program for this problem      Parveen Rick MD, CAQSM    PROCEDURE    Right Ankle Injection - Ultrasound Guided  The patient was informed of the risks and the benefits of the procedure and a written consent was signed.  The patient s right  ankle was prepped with chlorhexidine in sterile fashion.   40 mg of triamcinolone suspension was drawn up into a 5 mL syringe with 2 mL of 1% lidocaine.  Injection was performed using sterile technique.  Under ultrasound guidance a 1.5-inch 22-gauge needle was used to enter the anterior aspect of the right ankle at the tibiotalar joint.  Needle placement was visualized and documented with ultrasound.  Needle was placed just medial to the anterior tibialis tendon in long axis to the probe. Ultrasound visualization was necessary due to decreased joint space in the setting of osteoarthritis.  Images were permanently stored for the patient's record.  There were no complications. The patient tolerated the procedure well. There was negligible bleeding.        Medium Joint Injection: R ankle    Date/Time: 5/14/2025 4:02 PM    Performed by: Parveen Rick MD  Authorized by: Parveen Rick MD    Indications:  Pain, joint swelling and diagnostic evaluation  Needle Size:  22 G  Guidance: ultrasound    Approach:  Anteromedial  Location:  Ankle  Site:  R ankle  Medications:  40 mg methylPREDNISolone 40 MG/ML; 3 mL lidocaine 1 %  Outcome:  Tolerated well, no immediate complications  Procedure discussed: discussed risks, benefits, and alternatives    Consent Given by:  Patient  Timeout: timeout called immediately prior to procedure    Prep: patient was prepped and draped in usual sterile fashion          Again, thank  you for allowing me to participate in the care of your patient.        Sincerely,        Parveen Rick MD    Electronically signed

## 2025-05-15 RX ORDER — LIDOCAINE HYDROCHLORIDE 10 MG/ML
3 INJECTION, SOLUTION INFILTRATION; PERINEURAL
Status: COMPLETED | OUTPATIENT
Start: 2025-05-14 | End: 2025-05-14

## 2025-05-15 RX ORDER — METHYLPREDNISOLONE ACETATE 40 MG/ML
40 INJECTION, SUSPENSION INTRA-ARTICULAR; INTRALESIONAL; INTRAMUSCULAR; SOFT TISSUE
Status: COMPLETED | OUTPATIENT
Start: 2025-05-14 | End: 2025-05-14

## 2025-06-22 ENCOUNTER — HEALTH MAINTENANCE LETTER (OUTPATIENT)
Age: 60
End: 2025-06-22

## 2025-06-30 ENCOUNTER — OFFICE VISIT (OUTPATIENT)
Dept: DERMATOLOGY | Facility: CLINIC | Age: 60
End: 2025-06-30
Payer: COMMERCIAL

## 2025-06-30 DIAGNOSIS — D22.9 MULTIPLE MELANOCYTIC NEVI: ICD-10-CM

## 2025-06-30 DIAGNOSIS — L82.1 SEBORRHEIC KERATOSIS: ICD-10-CM

## 2025-06-30 DIAGNOSIS — L81.4 SOLAR LENTIGO: ICD-10-CM

## 2025-06-30 DIAGNOSIS — D18.01 CHERRY ANGIOMA: Primary | ICD-10-CM

## 2025-06-30 ASSESSMENT — PAIN SCALES - GENERAL: PAINLEVEL_OUTOF10: NO PAIN (0)

## 2025-06-30 NOTE — NURSING NOTE
@Nando Wolfe's goals for this visit include:   Chief Complaint   Patient presents with    Skin Check     Patient is here for a [facility]. No personal or family history, NMSC in sister. Lesions on nose.       He requests these members of his care team be copied on today's visit information: NO    PCP: Santos Duenas    Referring Provider:  Referred Self, MD  No address on file    Do you need any medication refills at today's visit? NO    Ida Stewart, [facility]

## 2025-06-30 NOTE — PROGRESS NOTES
[facility] Dermatology Note    Encounter Date: Jun 30, 2025    Dermatology Problem List:  Last skin check 6/30/25  Acrochordon - R hip - s/p bx 6/7/23    ______________________________________    Impression/Plan:    Two skin colored papules on the nose, suspect intradermal melanocytic nevi.  - Return for biopsy if desired    2. Reassurance provided for benign lesions not treated today including cherry angiomata, solar lentigines, seborrheic keratoses, and banal-appearing melanocytic nevi.      Follow-up in 6-8 months for biopsy of nose.       Staff Involved:  Staff/Scribe  Scribe Disclosure:   I, Jessica Valladares, am serving as a scribe to document services personally performed by Parveen Billy MD based on data collection and the provider's statements to me.     Provider Disclosure:   The documentation recorded by the scribe accurately reflects the services I personally performed and the decisions made by me.    Parveen Billy MD   of Dermatology  Department of Dermatology  [facility]        CC:   Chief Complaint   Patient presents with    Skin Check     Patient is here for a [facility]. No personal or family history, NMSC in sister. Lesions on nose.       History of Present Illness:  Mr. Nando Wolfe is a 59 year old male who presents as a return patient.    Today, patient reports he is here for a skin check. No personal history of skin cancer. Sister had [facility]. Reports area of concern on the nose.     Labs:  6/2023 Dermatopathology reviewed    Physical exam:  Vitals: There were no vitals taken for this visit.  GEN: This is a well developed, well-nourished male in no acute distress, in a pleasant mood.    SKIN: Pederson phototype II  - Full skin, which includes the head/face, both arms, chest, back, abdomen,both legs, genitalia and/or groin buttocks, digits and/or nails, was examined.  - There are dome shaped bright red papules on the  head/neck, trunk, extremities.   - Multiple regular brown pigmented macules and papules are identified on the head/neck, trunk, extremities.   - Scattered brown macules on sun exposed areas.  - There are waxy stuck on tan to brown papules on the head/neck, trunk, extremities.  - No other lesions of concern on areas examined.     Past Medical History:   Past Medical History:   Diagnosis Date    DDD (degenerative disc disease), lumbar      Past Surgical History:   Procedure Laterality Date    COLONOSCOPY WITH CO2 INSUFFLATION N/A 2/9/2018    Procedure: COLONOSCOPY WITH CO2 INSUFFLATION;  Shania Sosa/Colonoscopy/Screening/[facility] Fax number: [phone];  Surgeon: Hussein Jessica MD;  Location: [facility] OR    HERNIA REPAIR      TONSILLECTOMY         Social History:   reports that he quit smoking about 12 years ago. His smoking use included cigarettes. He has never been exposed to tobacco smoke. He has never used smokeless tobacco. He reports current alcohol use. He reports that he does not use drugs.    Family History:  Family History   Problem Relation Age of Onset    Cerebrovascular Disease Maternal Grandfather     Heart Disease Paternal Grandfather     Cerebrovascular Disease Maternal Uncle     Breast Cancer Paternal Aunt     Colon Cancer Paternal Uncle     Diabetes No family hx of     Coronary Artery Disease No family hx of     Hypertension No family hx of     Hyperlipidemia No family hx of     Prostate Cancer No family hx of     Depression No family hx of     Anxiety Disorder No family hx of     Asthma No family hx of     Thyroid Disease No family hx of     Genetic Disorder No family hx of        Medications:  Current Outpatient Medications   Medication Sig Dispense Refill    albuterol (PROAIR HFA/PROVENTIL HFA/VENTOLIN HFA) 108 (90 Base) MCG/ACT inhaler Inhale 2 puffs into the lungs every 4 hours as needed for shortness of breath, wheezing or cough Use with spacer 18 g 0    azithromycin (ZITHROMAX) 250  MG tablet 2 tablets the first day, then 1 tablet daily for the next 4 days 6 tablet 0     No Known Allergies

## 2025-06-30 NOTE — LETTER
6/30/2025      Nando Wolfe  [address]  [place] [address]      Dear Colleague,    Thank you for [facility] your patient, [facility]ando Wolfe[facility]. Please see a copy of my visit note below.    [facility] Dermatology Note    Encounter Date: Jun 30, 2025    Dermatology Problem List:  Last skin check 6/30/25  Acrochordon - R hip - s/p bx 6/7/23    ______________________________________    Impression/Plan:    Two skin colored papules on the nose, suspect intradermal melanocytic nevi.  - Return for biopsy if desired    2. Reassurance provided for benign lesions not treated today including cherry angiomata, solar lentigines, seborrheic keratoses, and banal-appearing melanocytic nevi.      Follow-up in 6-8 months for biopsy of nose.       Staff Involved:  Staff/Scribe  Scribe Disclosure:   I, Jessica Valladares, am serving as a scribe to document services personally performed by Parveen Billy MD based on data collection and the provider's statements to me.     Provider Disclosure:   The documentation recorded by the scribe accurately reflects the services I personally performed and the decisions made by me.    Parveen Billy MD   of Dermatology  Department of Dermatology  [facility]        CC:   Chief Complaint   Patient presents with     Skin Check     Patient is here for [place] [facility]. No personal or family history, NMSC in sister. Lesions on nose.       History of Present Illness:  Mr. Nando Wolfe is a 59 year old male who presents as a return patient.    Today, patient reports he is here for a skin check. No personal history of skin cancer. Sister had [facility]. Reports area of concern on the nose.     Labs:  6/2023 Dermatopathology reviewed    Physical exam:  Vitals: There were no vitals taken for this visit.  GEN: This is a well developed, well-nourished male in no acute distress, in a pleasant mood.     SKIN: Pederson phototype II  - Full skin, which includes the head/face, both arms, chest, back, abdomen,both legs, genitalia and/or groin buttocks, digits and/or nails, was examined.  - There are dome shaped bright red papules on the head/neck, trunk, extremities.   - Multiple regular brown pigmented macules and papules are identified on the head/neck, trunk, extremities.   - Scattered brown macules on sun exposed areas.  - There are waxy stuck on tan to brown papules on the head/neck, trunk, extremities.  - No other lesions of concern on areas examined.     Past Medical History:   Past Medical History:   Diagnosis Date     DDD (degenerative disc disease), lumbar      Past Surgical History:   Procedure Laterality Date     COLONOSCOPY WITH CO2 INSUFFLATION N/A 2/9/2018    Procedure: COLONOSCOPY WITH CO2 INSUFFLATION;  Shania Sosa/Colonoscopy/Screening/[facility] Fax number: [phone];  Surgeon: Hussein Jessica MD;  Location: [facility] OR     HERNIA REPAIR       TONSILLECTOMY         Social History:   reports that he quit smoking about 12 years ago. His smoking use included cigarettes. He has never been exposed to tobacco smoke. He has never used smokeless tobacco. He reports current alcohol use. He reports that he does not use drugs.    Family History:  Family History   Problem Relation Age of Onset     Cerebrovascular Disease Maternal Grandfather      Heart Disease Paternal Grandfather      Cerebrovascular Disease Maternal Uncle      Breast Cancer Paternal Aunt      Colon Cancer Paternal Uncle      Diabetes No family hx of      Coronary Artery Disease No family hx of      Hypertension No family hx of      Hyperlipidemia No family hx of      Prostate Cancer No family hx of      Depression No family hx of      Anxiety Disorder No family hx of      Asthma No family hx of      Thyroid Disease No family hx of      Genetic Disorder No family hx of        Medications:  Current Outpatient Medications    Medication Sig Dispense Refill     albuterol (PROAIR HFA/PROVENTIL HFA/VENTOLIN HFA) 108 (90 Base) MCG/ACT inhaler Inhale 2 puffs into the lungs every 4 hours as needed for shortness of breath, wheezing or cough Use with spacer 18 g 0     azithromycin (ZITHROMAX) 250 MG tablet 2 tablets the first day, then 1 tablet daily for the next 4 days 6 tablet 0     No Known Allergies            Again, thank you for allowing me to participate in the care of your patient.        Sincerely,    Parveen Billy MD    Electronically signed

## 2025-07-17 ENCOUNTER — PATIENT OUTREACH (OUTPATIENT)
Dept: CARE COORDINATION | Facility: CLINIC | Age: 60
End: 2025-07-17
Payer: COMMERCIAL

## 2025-08-06 ENCOUNTER — OFFICE VISIT (OUTPATIENT)
Dept: ORTHOPEDICS | Facility: CLINIC | Age: 60
End: 2025-08-06
Payer: COMMERCIAL

## 2025-08-06 DIAGNOSIS — M51.16 LUMBAR DISC HERNIATION WITH RADICULOPATHY: Primary | ICD-10-CM

## 2025-08-06 DIAGNOSIS — M19.071 ARTHRITIS OF RIGHT ANKLE: ICD-10-CM

## 2025-08-06 PROCEDURE — 99214 OFFICE O/P EST MOD 30 MIN: CPT | Performed by: PREVENTIVE MEDICINE

## (undated) DEVICE — SOL WATER IRRIG 1000ML BOTTLE 07139-09

## (undated) RX ORDER — FENTANYL CITRATE 50 UG/ML
INJECTION, SOLUTION INTRAMUSCULAR; INTRAVENOUS
Status: DISPENSED
Start: 2018-02-09

## (undated) RX ORDER — SIMETHICONE 40MG/0.6ML
SUSPENSION, DROPS(FINAL DOSAGE FORM)(ML) ORAL
Status: DISPENSED
Start: 2018-02-09